# Patient Record
Sex: FEMALE | Race: WHITE | NOT HISPANIC OR LATINO | Employment: FULL TIME | URBAN - METROPOLITAN AREA
[De-identification: names, ages, dates, MRNs, and addresses within clinical notes are randomized per-mention and may not be internally consistent; named-entity substitution may affect disease eponyms.]

---

## 2017-02-17 ENCOUNTER — APPOINTMENT (OUTPATIENT)
Dept: LAB | Facility: MEDICAL CENTER | Age: 27
End: 2017-02-17

## 2017-02-17 ENCOUNTER — APPOINTMENT (OUTPATIENT)
Dept: URGENT CARE | Facility: MEDICAL CENTER | Age: 27
End: 2017-02-17

## 2017-02-17 ENCOUNTER — TRANSCRIBE ORDERS (OUTPATIENT)
Dept: URGENT CARE | Facility: MEDICAL CENTER | Age: 27
End: 2017-02-17

## 2017-02-17 DIAGNOSIS — Z00.8 SPECIAL EXAM: Primary | ICD-10-CM

## 2017-02-17 DIAGNOSIS — Z00.8 SPECIAL EXAM: ICD-10-CM

## 2017-02-17 PROCEDURE — 86480 TB TEST CELL IMMUN MEASURE: CPT

## 2017-02-17 PROCEDURE — 36415 COLL VENOUS BLD VENIPUNCTURE: CPT

## 2017-02-19 LAB
ANNOTATION COMMENT IMP: NORMAL
GAMMA INTERFERON BACKGROUND BLD IA-ACNC: 0.02 IU/ML
M TB IFN-G BLD-IMP: NEGATIVE
M TB IFN-G CD4+ BCKGRND COR BLD-ACNC: 0.02 IU/ML
M TB IFN-G CD4+ T-CELLS BLD-ACNC: 0.04 IU/ML
MITOGEN IGNF BLD-ACNC: >10 IU/ML
QUANTIFERON-TB GOLD IN TUBE: NORMAL
SERVICE CMNT-IMP: NORMAL

## 2019-04-25 ENCOUNTER — OFFICE VISIT (OUTPATIENT)
Dept: OBGYN CLINIC | Facility: MEDICAL CENTER | Age: 29
End: 2019-04-25
Payer: COMMERCIAL

## 2019-04-25 VITALS — WEIGHT: 227 LBS | SYSTOLIC BLOOD PRESSURE: 128 MMHG | DIASTOLIC BLOOD PRESSURE: 70 MMHG

## 2019-04-25 DIAGNOSIS — N92.6 IRREGULAR MENSES: Primary | ICD-10-CM

## 2019-04-25 DIAGNOSIS — E28.2 PCOS (POLYCYSTIC OVARIAN SYNDROME): ICD-10-CM

## 2019-04-25 PROCEDURE — 99203 OFFICE O/P NEW LOW 30 MIN: CPT | Performed by: NURSE PRACTITIONER

## 2019-04-25 RX ORDER — MEDROXYPROGESTERONE ACETATE 10 MG/1
TABLET ORAL
Qty: 20 TABLET | Refills: 2 | Status: SHIPPED | OUTPATIENT
Start: 2019-04-25 | End: 2021-07-19

## 2019-07-31 ENCOUNTER — APPOINTMENT (OUTPATIENT)
Dept: LAB | Facility: HOSPITAL | Age: 29
End: 2019-07-31
Payer: COMMERCIAL

## 2019-07-31 ENCOUNTER — TELEPHONE (OUTPATIENT)
Dept: OBGYN CLINIC | Facility: MEDICAL CENTER | Age: 29
End: 2019-07-31

## 2019-07-31 DIAGNOSIS — R79.89 LOW SERUM PROGESTERONE: Primary | ICD-10-CM

## 2019-07-31 DIAGNOSIS — R79.89 LOW SERUM PROGESTERONE: ICD-10-CM

## 2019-07-31 PROCEDURE — 84144 ASSAY OF PROGESTERONE: CPT

## 2019-07-31 PROCEDURE — 36415 COLL VENOUS BLD VENIPUNCTURE: CPT

## 2019-07-31 NOTE — TELEPHONE ENCOUNTER
----- Message from Osei Chan, [address] sent at 7/31/2019 12:43 PM EDT -----  Looks like this progesterone level was ordered in April to confirm ovulation  This was to have been done on day 21 of cycle - please confirm with patient that this was done with correct timing   If it was done on day 21, please let her know I would recommend that she repeats this today or tomorrow, as it is low and indicates that she has not yet ovulated   If it was not done on day 21 she can repeat this with next cycle on day 21  Please also confirm whether or not we ever received her records from previous provider - see my visit note

## 2019-07-31 NOTE — TELEPHONE ENCOUNTER
Patient had her period for 5 days  No bleeding since  She did her Progesterone on day 21 and was told it was low so she needed to repeat it - she went today to have it done  No bleeding

## 2019-07-31 NOTE — TELEPHONE ENCOUNTER
Pt returned my call and pt stated did it on day 21 when she was actively bleeding on day one she was spotting and did on the next day when she was actively bleeding which was say 21  Ordered repeat for progesterone level pt will get done tomorrow due to works if can today find time between work will do today  I reassured pt its okay if cant do till tomorrow  confirmed with pt records from previous provider received they are under epic media dated 06/20/19

## 2019-08-01 LAB — PROGEST SERPL-MCNC: 0.9 NG/ML

## 2019-08-05 ENCOUNTER — TELEPHONE (OUTPATIENT)
Dept: OBGYN CLINIC | Facility: CLINIC | Age: 29
End: 2019-08-05

## 2019-08-05 NOTE — TELEPHONE ENCOUNTER
----- Message from Becka Jett, [address]  sent at 8/2/2019  5:53 PM EDT -----  Progesterone level remains low, indicating that she has not ovulated yet this cycle  If cycles have been on the longer side recently she may check another early next week to see if there has been a rise  Or she may schedule an office visit to discuss her recent cycles since she was last seen in April

## 2019-08-05 NOTE — TELEPHONE ENCOUNTER
Patient is aware of her results  She stated her cycles have been longer  She wanted to see Mary Montez to discuss  Appt made

## 2019-08-22 ENCOUNTER — OFFICE VISIT (OUTPATIENT)
Dept: OBGYN CLINIC | Facility: MEDICAL CENTER | Age: 29
End: 2019-08-22
Payer: COMMERCIAL

## 2019-08-22 VITALS — DIASTOLIC BLOOD PRESSURE: 64 MMHG | SYSTOLIC BLOOD PRESSURE: 128 MMHG | WEIGHT: 218 LBS

## 2019-08-22 DIAGNOSIS — E28.2 PCOS (POLYCYSTIC OVARIAN SYNDROME): Primary | ICD-10-CM

## 2019-08-22 PROCEDURE — 99213 OFFICE O/P EST LOW 20 MIN: CPT | Performed by: NURSE PRACTITIONER

## 2019-08-22 NOTE — PROGRESS NOTES
Assessment/Plan:    PCOS (polycystic ovarian syndrome)  Reviewed results of forwarded records and advised results are suggestive of PCOS  Recommended consultation with RE&I to discuss induction of ovulation with Clomid and further work up if indicated  She agrees with plan and was given contact info for MARTHA Singh and [facility]  Reproductive Medicine  Recommended continuing Metformin at this time  She will trial TID dosing but if not well tolerated will resume BID dosing schedule  Advised she may continue Provera and d21 progesterone if desired  Refill sent for provera and she will call if interested in d21 progesterone levels  F/u with pos UPT or sooner PRN        Diagnoses and all orders for this visit:    PCOS (polycystic ovarian syndrome)  -     Ambulatory referral to Infertility; Future          Subjective:      Patient ID: Page Dugan is a 26 y o  female  This patient presents for PCOS, TTC follow up   Labs and imaging from prev provider are suggestive of PCOS - see under Media   Took Provera 10x10 in late May and had withdrawal bleed   d21 OPK was vague   Repeated Provera in late June then had withdrawal in July and low progesterone on d21  No period since   Tolerating metformin 500 BID  Lost about 10 lbs  They have been having intercourse q other day for the most part       The following portions of the patient's history were reviewed and updated as appropriate: allergies, current medications, past family history, past medical history, past social history, past surgical history and problem list     Review of Systems   Constitutional: Negative  Respiratory: Negative  Cardiovascular: Negative  Gastrointestinal: Negative  Genitourinary: Positive for menstrual problem  Negative for dysuria, vaginal bleeding and vaginal discharge  Musculoskeletal: Negative  Skin: Negative  Neurological: Negative  Psychiatric/Behavioral: Negative            Objective:      /64   Wt 98 9 kg (218 lb)   LMP 07/07/2019          Physical Exam   Constitutional: She is oriented to person, place, and time  She appears well-developed and well-nourished  HENT:   Head: Normocephalic  Eyes: Pupils are equal, round, and reactive to light  Neck: Normal range of motion  Pulmonary/Chest: Effort normal    Neurological: She is alert and oriented to person, place, and time  Psychiatric: She has a normal mood and affect   Her behavior is normal  Judgment and thought content normal

## 2019-08-22 NOTE — ASSESSMENT & PLAN NOTE
Reviewed results of forwarded records and advised results are suggestive of PCOS  Recommended consultation with RE&I to discuss induction of ovulation with Clomid and further work up if indicated  She agrees with plan and was given contact info for MARTHA Dorantes and [facility]  Reproductive Medicine  Recommended continuing Metformin at this time  She will trial TID dosing but if not well tolerated will resume BID dosing schedule  Advised she may continue Provera and d21 progesterone if desired  Refill sent for provera and she will call if interested in d21 progesterone levels     F/u with pos UPT or sooner PRN

## 2019-08-31 DIAGNOSIS — E28.2 PCOS (POLYCYSTIC OVARIAN SYNDROME): ICD-10-CM

## 2019-11-08 DIAGNOSIS — E28.2 PCOS (POLYCYSTIC OVARIAN SYNDROME): ICD-10-CM

## 2019-12-05 DIAGNOSIS — E28.2 PCOS (POLYCYSTIC OVARIAN SYNDROME): ICD-10-CM

## 2020-03-09 DIAGNOSIS — E28.2 PCOS (POLYCYSTIC OVARIAN SYNDROME): ICD-10-CM

## 2020-06-02 DIAGNOSIS — E28.2 PCOS (POLYCYSTIC OVARIAN SYNDROME): ICD-10-CM

## 2020-08-30 DIAGNOSIS — E28.2 PCOS (POLYCYSTIC OVARIAN SYNDROME): ICD-10-CM

## 2020-09-01 NOTE — TELEPHONE ENCOUNTER
Please contact the patient to encourage scheduling routine annual and additional refills will be provided at that time

## 2020-09-15 DIAGNOSIS — E28.2 PCOS (POLYCYSTIC OVARIAN SYNDROME): ICD-10-CM

## 2020-09-17 NOTE — TELEPHONE ENCOUNTER
Please contact the patient to encourage scheduling a routine annual gyn exam at her earliest convenience   Refills were provided 2 weeks ago and additional refills will be provided at that time

## 2020-11-11 ENCOUNTER — TELEPHONE (OUTPATIENT)
Dept: OTHER | Facility: OTHER | Age: 30
End: 2020-11-11

## 2021-06-12 ENCOUNTER — TELEPHONE (OUTPATIENT)
Dept: OTHER | Facility: OTHER | Age: 31
End: 2021-06-12

## 2021-06-12 NOTE — TELEPHONE ENCOUNTER
Pt is in early pregnancy and would like to schedule a new patient appt with Dr Duran Single  Please call patient during office hours to schedule

## 2021-06-30 ENCOUNTER — ULTRASOUND (OUTPATIENT)
Dept: OBGYN CLINIC | Facility: CLINIC | Age: 31
End: 2021-06-30
Payer: COMMERCIAL

## 2021-06-30 VITALS
SYSTOLIC BLOOD PRESSURE: 110 MMHG | DIASTOLIC BLOOD PRESSURE: 80 MMHG | BODY MASS INDEX: 32.37 KG/M2 | WEIGHT: 201.4 LBS | HEIGHT: 66 IN

## 2021-06-30 DIAGNOSIS — N91.2 AMENORRHEA: Primary | ICD-10-CM

## 2021-06-30 DIAGNOSIS — Z36.9 ANTENATAL SCREENING ENCOUNTER: ICD-10-CM

## 2021-06-30 PROCEDURE — 76817 TRANSVAGINAL US OBSTETRIC: CPT | Performed by: NURSE PRACTITIONER

## 2021-06-30 RX ORDER — METFORMIN HYDROCHLORIDE 500 MG/1
500 TABLET, EXTENDED RELEASE ORAL 3 TIMES DAILY
COMMUNITY
Start: 2021-05-28 | End: 2021-08-22

## 2021-06-30 RX ORDER — LEVOTHYROXINE SODIUM 0.05 MG/1
50 TABLET ORAL DAILY
COMMUNITY
Start: 2021-05-30 | End: 2021-09-02 | Stop reason: SDUPTHER

## 2021-06-30 NOTE — PATIENT INSTRUCTIONS
First Trimester Pregnancy   WHAT YOU NEED TO KNOW:   The first trimester of pregnancy lasts from your last period through the 12th week of pregnancy  Follow up with your healthcare providers for prenatal care  Regular prenatal care can help to keep you and your baby healthy  DISCHARGE INSTRUCTIONS:   Return to the emergency department if:   · You have pain or cramping in your abdomen or low back  · You have severe vaginal bleeding or clotting  Call your doctor or obstetrician if:   · You have light bleeding  · You have chills or a fever  · You have vaginal itching, burning, or pain  · You have yellow, green, white, or foul-smelling vaginal discharge  · You have pain or burning when you urinate, less urine than usual, or pink or bloody urine  · You have questions or concerns about your condition or care  Follow up with your doctor or obstetrician as directed:  Go to all of your prenatal visits during your pregnancy  Write down your questions so you remember to ask them during your visits  Prenatal care:  Prenatal care is a series of visits with your healthcare provider throughout your pregnancy  Prenatal care can help prevent problems during pregnancy and childbirth  At each prenatal visit, your healthcare provider will weigh you and check your blood pressure  Your healthcare provider will also check your baby's heartbeat and growth  You may also need the following at some visits:  · A pelvic exam  allows your healthcare provider to see your cervix (the bottom part of your uterus)  Your healthcare provider will use a speculum to open your vagina  He or she will check the size and shape of your uterus  · Blood tests  may be done to check for any of the following:     ? Gestational diabetes or anemia (low iron level)    ? Blood type or Rh factor, or certain birth defects    ? Immunity to certain diseases, such as chickenpox or rubella    ?  An infection, such as a sexually transmitted infection, HIV, or hepatitis B    · Hepatitis B  may need to be prevented or treated  Hepatitis B is inflammation of the liver caused by the hepatitis B virus (HBV)  HBV can spread from a mother to her baby during delivery  You will be checked for HBV as early as possible in the first trimester of each pregnancy  You need the test even if you received the hepatitis B vaccine or were tested before  You may need to have an HBV infection treated before you give birth  · Urine tests  may also be done to check for sugar and protein  These can be signs of gestational diabetes or preeclampsia  Urine tests may also be done to check for signs of infection  · A fetal ultrasound  shows pictures of your baby inside your uterus  The pictures are used to check your baby's development, movement, and position  Your healthcare provider may be able to tell you if you are having a boy or a girl during the ultrasound  This is usually possible at around 18 to 22 weeks  · Genetic disorder screening tests  may be offered to you  These screening tests check your baby's risk for genetic disorders such as Down syndrome  A screening test includes a blood test and ultrasound  Stay healthy during pregnancy:   · Take prenatal vitamins as directed  Prenatal vitamins can help you get the amount of vitamins and minerals you need during pregnancy  Prenatal vitamins may also decrease the risk of certain birth defects  · Eat a variety of healthy foods  Healthy foods include fruits, vegetables, whole-grain breads, low-fat dairy products, beans, turkey and chicken, and lean red meat  Ask your healthcare provider for more information about foods that are healthy and safe to eat during pregnancy  · Drink liquids as directed  Ask how much liquid to drink each day and which liquids are best for you  Some healthy liquids include milk, water, and juice  It is not clear how caffeine affects pregnancy   Limit your intake of caffeine to less than 200 mg each day to avoid possible health problems  Caffeine may be found in coffee, tea, cola, sports drinks, and chocolate  Do not drink alcohol  · Talk to your healthcare provider before you take medicines  Many medicines can harm your baby, especially during early pregnancy  Ask your healthcare provider before you take any medicines, including over-the-counter medicines, vitamins, herbs, or food supplements  Never use illegal or street drugs while you are pregnant  Talk to your healthcare provider if you are having trouble quitting street drugs  · Exercise as directed  Ask your healthcare provider about the best exercise plan for you  Exercise may help you feel better and make your labor and delivery easier  · Do not smoke  Smoking increases your risk of a miscarriage and other health problems during your pregnancy  Smoking can cause your baby to be born too early or weigh less at birth  Quit smoking as soon as you think you might be pregnant  Ask your healthcare provider for information if you need help quitting  Safety tips:   · Do not use a hot tub or sauna  while you are pregnant, especially during your first trimester  Hot tubs and saunas may raise your baby's temperature and increase the risk of birth defects  It also increases your risk of miscarriage  · Protect yourself from illness  Toxoplasmosis is a disease that can cause birth defects  To protect yourself from this disease, do not clean your cat's litter box yourself  Ask someone else to clean your cat's litter box while you are pregnant  Also, do not  eat raw meat or unwashed fruits and vegetables  Wash your hands after you touch raw meat, and eat only well-cooked meat  Wash fruits and vegetables well before you eat them  © Copyright [address] Information is for End User's use only and may not be sold, redistributed or otherwise used for commercial purposes   All illustrations and images included in CareNotes® are the copyrighted property of [employer]  or Reza Caz[employer]   The above information is an  only  It is not intended as medical advice for individual conditions or treatments  Talk to your doctor, nurse or pharmacist before following any medical regimen to see if it is safe and effective for you

## 2021-06-30 NOTE — PROGRESS NOTES
Technician: Study performed by the interpreting Certified Nurse Practitioner    Indications:  amenorrhea       Prior to use, disinfection was performed with Trophon Disinfection Process  Probe Serial Number#                                          [id number] [facility])      LMP 21   (10 weeks 1 days gestational age based on dates), IUI assisted pregnancy, [place] pt  Hx of PCOS  , chemical pregnancy       Procedure Details  A gestational sac is seen containing a yolk sac and a early embryo  The embryonic crown-rump length measures 3 21cm  and calculates to an estimated gestational age of 9 weeks and 1 Day  Embryonic cardiac activity is present @ 130's     Findings:   Viable, early intrauterine pregnancy at  10 weeks,  1 days, (no change to MARIANN)  with a calculated MARIANN of 22    Plan to RTO for OB Intake  First Trimester Screening reviewed- referral given

## 2021-07-12 ENCOUNTER — TELEMEDICINE (OUTPATIENT)
Dept: OBGYN CLINIC | Facility: CLINIC | Age: 31
End: 2021-07-12

## 2021-07-12 VITALS — HEIGHT: 66 IN | BODY MASS INDEX: 32.3 KG/M2 | WEIGHT: 201 LBS

## 2021-07-12 DIAGNOSIS — Z34.81 PRENATAL CARE, SUBSEQUENT PREGNANCY, FIRST TRIMESTER: ICD-10-CM

## 2021-07-12 PROCEDURE — OBC: Performed by: NURSE PRACTITIONER

## 2021-07-12 RX ORDER — CHOLECALCIFEROL (VITAMIN D3) 125 MCG
2000 CAPSULE ORAL DAILY
COMMUNITY

## 2021-07-12 NOTE — PATIENT INSTRUCTIONS
Gestational Diabetes   AMBULATORY CARE:   Gestational diabetes (GDM)  is a type of diabetes that develops during pregnancy, usually in the second or third trimester  GDM causes your blood sugar level to rise too high  This can harm you and your unborn baby  Blood sugar levels usually go back to normal after the baby is born  The cause of GDM is not known  Hormones made by the placenta may cause insulin resistance  Insulin helps move sugar out of the blood so it can be used for energy  Insulin resistance means your pancreas makes insulin, but your body cannot use it  As the placenta grows, more of these hormones are produced  The hormones block insulin and cause your blood sugar level to rise  Signs and symptoms of GDM include the following:   · More hunger or thirst than usual    · Having to urinate often    · Blurred vision    · Feeling more tired than usual    · Bladder, vagina, or skin infections that happen often    · More weight gain than your healthcare provider suggests during your pregnancy    · Nausea or vomiting    Seek care immediately if:   · Your heartbeat is fast and weak, or your breathing is fast and shallow  · You are more sleepy than usual or become confused  · You have blurred or double vision  · Your breath has a fruity, sweet smell  · You are shaking or sweating  · Your blood sugar level is below 70 mg/dL or above 250 mg/dL and does not improve with treatment  · You have a headache, or you are dizzy  Call your doctor or diabetes care team if:   · You think your baby is not moving as much as usual     · You have more hunger or thirst than usual     · You are urinating more often than usual     · You have an upset stomach and are vomiting  · You have questions or concerns about your condition or care      The following may increase your risk for GDM:   · Lack of physical activity, such as exercise    · A family history of diabetes    · A history of high blood sugar, blood pressure, or cholesterol levels    · Being overweight or obese    · Past delivery of a large baby    · Glycosuria (sugar in your urine) or polycystic ovary syndrome (PCOS)    · Being [place] American, , , [place] American, or [place] heritage    Manage GDM:  GDM may be controlled with meal planning and physical activity  The goal is to keep your blood sugar level as close to normal, as safely as possible  Your healthcare provider and dietitian will help set up a meal and activity plan for you  · Follow your meal plan as directed  Talk to a dietitian or healthcare provider about the best meal plan for you  He or she may recommend that you eat 3 small meals and 2 to 4 snacks every day  Control the amount of carbohydrates (such as bread, cereal, and fruit) you eat at each meal and snack  Too much carbohydrate in 1 meal or snack can cause your blood sugar to rise to a high level  Your dietitian or provider will tell you how much carbohydrate to eat at each meal and snack  Eat foods that are a good source of fiber, such as vegetables and legumes (beans and lentils)  · Ask your provider about the best physical activity plan for you  Physical activity helps keep your blood sugar level steady  A good goal is to be active for at least 30 minutes, 5 days a week  Low-impact activities such as walking or swimming are effective  Insulin  may be needed if your diabetes is not controlled by nutrition and exercise  Insulin is safe to use during pregnancy  Check your blood sugar level as directed:   · You will be taught how to check a small drop of blood in a glucose monitor  Ask your healthcare provider when and how often to check your blood sugar level during the day  You may need to check your blood sugar level at least 3 times each day  · Ask your provider what your blood sugar levels should be before and after you eat   He or she may suggest that your blood sugar level should be at or below 95 mg/dL before you eat  The level may need to be at or below 140 mg/dL 1 hour after you eat or at or below 120 mg/dL 2 hours after you eat  Your provider may give you higher target levels if you are at risk for hypoglycemia  Write down your results, and show them to your provider  He or she may use the results to make changes to your medicine, food, and physical activity schedules  What else you need to know about GDM:   · Your A1c level may be checked  A hemoglobin A1c is a blood test that measures your average blood sugar level for the past 2 to 3 months  It is also called an HbA1c or glycohemoglobin test  The level is given as a percentage  An A1c of 6% or lower is usually recommended during pregnancy  If you are at risk for hypoglycemia, your goal may be 7%  Changes to your nutrition, physical activity, or medicine plan may be made to help you reach your goal  Your provider may recommend that you have your A1c checked 1 time each month  · Check your blood pressure often  High blood pressure can cause problems with your health and your pregnancy  Blood pressure readings are usually written as 2 numbers  Your systolic blood pressure (the first number) should be between 110 and 129  Your diastolic blood pressure (the second number) should be between 65 and 79          · Maintain a healthy weight  A healthy weight can help you control your GDM  Ask your healthcare provider how much weight is healthy for you to gain during your pregnancy  If you were overweight before you became pregnant, he or she may recommend a safe weight loss plan during pregnancy  Your provider or a dietitian can help you create a healthy meal plan for you and your baby  Do not  try to go on a crash diet or try to lose weight without your provider's approval  You may not get enough calories or nutrients for you and your baby  · Do not smoke    Nicotine is dangerous for you and your baby and can make it harder to manage GDM  Do not use e-cigarettes or smokeless tobacco in place of cigarettes or to help you quit  They still contain nicotine  Ask your provider for information if you currently smoke and need help quitting  · You may need diabetes screening after you give birth  Screening may be done 4 to 12 weeks after your baby is born  This is to check if you have developed diabetes, problems with your fasting glucose levels, or glucose intolerance  You may need other tests or treatment if you have any of these  Testing may be repeated every 1 to 3 years if you had GDM but normal tests within 12 weeks of giving birth  Follow up with your doctor or diabetes care team as directed: You will need to have screening tests for diabetes 4 to 12 weeks after you have your baby  You may also need to have tests for diabetes every 1 to 3 years for life  Write down your questions so you remember to ask them during your visits  © [employer] 2021 Information is for End User's use only and may not be sold, redistributed or otherwise used for commercial purposes  All illustrations and images included in CareNotes® are the copyrighted property of A D A M , Inc  or [address] [place], [place] [address]pawan   The above information is an  only  It is not intended as medical advice for individual conditions or treatments  Talk to your doctor, nurse or pharmacist before following any medical regimen to see if it is safe and effective for you  Pregnancy at [address] to [address]:   You are now at the end of your first trimester and entering your second trimester  Morning sickness usually goes away by this time  You may have other symptoms such as fatigue, frequent urination, and headaches  You may have gained 2 to 4 pounds by now  DISCHARGE INSTRUCTIONS:   Return to the emergency department if:   · You have pain or cramping in your abdomen or low back  · You have heavy vaginal bleeding or clotting      · You pass material that looks like tissue or large clots  Collect the material and bring it with you  Call your doctor or obstetrician if:   · You cannot keep food or drinks down, and you are losing weight  · You have light bleeding  · You have chills or a fever  · You have vaginal itching, burning, or pain  · You have yellow, green, white, or foul-smelling vaginal discharge  · You have pain or burning when you urinate, less urine than usual, or pink or bloody urine  · You have questions or concerns about your condition or care  How to care for yourself at this stage of your pregnancy:   · Get plenty of rest   You may feel more tired than normal  You may need to take naps or go to bed earlier  · Manage nausea and vomiting  Avoid fatty and spicy foods  Eat small meals throughout the day instead of large meals  Vicky may help to decrease nausea  Ask your healthcare provider about other ways of decreasing nausea and vomiting  · Eat a variety of healthy foods  Healthy foods include fruits, vegetables, whole-grain breads, low-fat dairy foods, beans, lean meats, and fish  Drink liquids as directed  Ask how much liquid to drink each day and which liquids are best for you  Limit caffeine to less than 200 milligrams each day  Limit your intake of fish to 2 servings each week  Choose fish low in mercury such as canned light tuna, shrimp, salmon, cod, or tilapia  Do not  eat fish high in mercury such as swordfish, tilefish, roland mackerel, and shark  · Take prenatal vitamins as directed  Your need for certain vitamins and minerals, such as folic acid, increases during pregnancy  Prenatal vitamins provide some of the extra vitamins and minerals you need  Prenatal vitamins may also help to decrease the risk of certain birth defects  · Do not smoke  Smoking increases your risk of a miscarriage and other health problems during your pregnancy   Smoking can cause your baby to be born too early or weigh less at birth  Ask your healthcare provider for information if you need help quitting  · Do not drink alcohol  Alcohol passes from your body to your baby through the placenta  It can affect your baby's brain development and cause fetal alcohol syndrome (FAS)  FAS is a group of conditions that causes mental, behavior, and growth problems  · Talk to your healthcare provider before you take any medicines  Many medicines may harm your baby if you take them when you are pregnant  Do not take any medicines, vitamins, herbs, or supplements without first talking to your healthcare provider  Never use illegal or street drugs (such as marijuana or cocaine) while you are pregnant  Safety tips during pregnancy:   · Avoid hot tubs and saunas  Do not use a hot tub or sauna while you are pregnant, especially during your first trimester  Hot tubs and saunas may raise your baby's temperature and increase the risk of birth defects  · Avoid toxoplasmosis  This is an infection caused by eating raw meat or being around infected cat feces  It can cause birth defects, miscarriages, and other problems  Wash your hands after you touch raw meat  Make sure any meat is well-cooked before you eat it  Avoid raw eggs and unpasteurized milk  Use gloves or ask someone else to clean your cat's litter box while you are pregnant  Changes happening with your baby: Your baby has fully formed fingernails and toenails  Your baby's heartbeat can now be heard  Ask your healthcare provider if you can listen to your baby's heartbeat  By week 14, your baby is over 4 inches long from the top of the head to the rump (baby's bottom)  Your baby weighs over 3 ounces  Prenatal care:  Prenatal care is a series of visits with your healthcare provider throughout your pregnancy  During the first 28 weeks of your pregnancy, you will see your healthcare provider 1 time each month   Prenatal care can help prevent problems during pregnancy and childbirth  Your healthcare provider will check your blood pressure and weight  Your baby's heart rate will also be checked  You may also need the following at some visits:  · A pelvic exam  allows your healthcare provider to see your cervix (the bottom part of your uterus)  Your healthcare provider will use a speculum to open your vagina  He or she will check the size and shape of your uterus  · Blood tests  may be done to check for any of the following:     ? Gestational diabetes or anemia (low iron level)    ? Blood type or Rh factor, or certain birth defects    ? Immunity to certain diseases, such as chickenpox or rubella    ? An infection, such as a sexually transmitted infection, HIV, or hepatitis B    · Hepatitis B  may need to be prevented or treated  Hepatitis B is inflammation of the liver caused by the hepatitis B virus (HBV)  HBV can spread from a mother to her baby during delivery  You will be checked for HBV as early as possible in the first trimester of each pregnancy  You need the test even if you received the hepatitis B vaccine or were tested before  You may need to have an HBV infection treated before you give birth  · Urine tests  may also be done to check for sugar and protein  These can be signs of gestational diabetes or preeclampsia  Urine tests may also be done to check for signs of infection  · A fetal ultrasound  shows pictures of your baby inside your uterus  The pictures are used to check your baby's development, movement, and position  · Genetic disorder screening tests  may be offered to you  These tests check your baby's risk for genetic disorders such as Down syndrome  A screening test includes a blood test and ultrasound  Follow up with your doctor or obstetrician as directed:  Go to all prenatal visits  Write down your questions so you remember to ask them during your visits    © Copyright [employer] 2020 Information is for End User's use only and may not be sold, redistributed or otherwise used for commercial purposes  All illustrations and images included in CareNotes® are the copyrighted property of A D A M , Inc  or Reza Ridley  The above information is an  only  It is not intended as medical advice for individual conditions or treatments  Talk to your doctor, nurse or pharmacist before following any medical regimen to see if it is safe and effective for you

## 2021-07-12 NOTE — PROGRESS NOTES
OB INTAKE INTERVIEW  Patient is 27 y o y o  who presents for OB intake at 11wks  She is accompanied by: herself  The father of her baby Carlos Sinclair) is involved in the pregnancy and is 35years old    Last Menstrual Period: 2021  Ultrasound: Measured 10 weeks 1 days on 2021 by LD  Estimated Date of Delivery: 2022 confirmed 10 week US    Signs/Symptoms of Pregnancy  Current pregnancy symptoms: fatigue, breast tenderness  Constipation no  Headaches no  Cramping/spotting no  PICA cravings no    Diabetes-  Body mass index is 32 44 kg/m²  If patient has 1 or more, please order early 1 hour GTT  History of GDM no  BMI >35 no  History of PCOS or current metformin use YES hx PCOS and 400mg TID metformin  History of LGA/macrosomic infant (4000g/9lbs) no    If patient has 2 or more, please order early 1 hour GTT  BMI>30 YES  AMA no  First degree relative with type 2 diabetes no  History of chronic HTN, hyperlipidemia, elevated A1C no  High risk race (, , ,  or ) no    Hypertension- if you answer yes, please order preeclampsia labs (cbc, comprehensive metabolic panel, urine protein creatinine ratio, uric acid)  History of of chronic HTN no  History of gestational HTN no  History of preeclampsia, eclampsia, or HELLP syndrome no  History of diabetes no  History of lupus, autoimmune disease, kidney disease no    Thyroid- if yes order TSH with reflex T4  History of thyroid disease YEShypo    Bleeding Disorder or Hx of DVT-patient or first degree relative with history of  Order the following if not done previously     (Factor V, antithrombin III, prothrombin gene mutation, protein C and S Ag, lupus anticoagulant, anticardiolipin, beta-2 glycoprotein)   no    OB/GYN-  History of abnormal pap smear no  History of HPV no  History of Herpes/HSV no  History of other STI (gonorrhea, chlamydia, trich) no  History of prior  no  History of prior  no  History of  delivery prior to 36 weeks 6 days no  History of blood transfusion no  Ok for blood transfusion YES    Substance screening- if yes outside of tobacco for her or anyone in her home-order urine drug screen  History of tobacco use YES quit few yrs ago  Currently using tobacco no  Currently using alcohol no  Presently using drugs no  Past drug use  no  IV drug use-If yes add Hep C antibody to labs no  Partner drug use no  Parent/Family drug use no    MRSA Screening-   Does the pt have a hx of MRSA? no  If yes- please follow MRSA protocol and obtain a nasal swab for MRSA culture    Immunizations:  Influenza vaccine given this season YES   Discussed Tdap vaccine YES  Discussed COVID Vaccine YES    Genetic/MFM-  Do you or your partner have a history of any of the following in yourselves or first degree relatives? Cystic fibrosis no  Spinal muscular atrophy no  Hemoglobinopathy/Sickle Cell/Thalassemia no  Fragile X Intellectual Disability no    If yes, discuss carrier screening and recommend consultation with MFM/genetic counseling  If no, discuss option for carrier screening and/or genetic testing with Nuchal Ultrasound  Patient interested YES  Appointment at [place] [id number] made YES  NT U/S setup already  Pt and  had genetic screening already    Interview education  [facility] Pregnancy Essentials Book reviewed and discussed YES    Nurse/Family Partnership- patient may qualify YES; referral placed NO    Prenatal lab work scripts YES  Extra labs ordered:  1hr glucose    The patient has a history now or in prior pregnancy notable for:   PCOS      Details that I feel the provider should be aware of: Deborah Puckett and her  Particia Niece went to [facility] and doing fertility tx  First was an early chemical pregnancy approx 5wk  She has hx PCOS and on Metformin, also hypothyroid found out during treatment process  She is currently on progesterone and was having her HCG and levels checked wkly   She is nervous because her last labs were on the low end of normal   Last pap was 2017 might need at PN1  PN1 visit scheduled  The patient was oriented to our practice, reviewed delivering physicians and Pager for Delivery  All questions were answered  YES    Interviewed by: China Toro MA

## 2021-07-19 ENCOUNTER — ROUTINE PRENATAL (OUTPATIENT)
Dept: PERINATAL CARE | Facility: CLINIC | Age: 31
End: 2021-07-19
Payer: COMMERCIAL

## 2021-07-19 VITALS
DIASTOLIC BLOOD PRESSURE: 70 MMHG | HEART RATE: 94 BPM | BODY MASS INDEX: 32.33 KG/M2 | HEIGHT: 66 IN | WEIGHT: 201.2 LBS | SYSTOLIC BLOOD PRESSURE: 132 MMHG

## 2021-07-19 DIAGNOSIS — Z36.82 NUCHAL TRANSLUCENCY OF FETUS ON PRENATAL ULTRASOUND: ICD-10-CM

## 2021-07-19 DIAGNOSIS — E28.2 PCOS (POLYCYSTIC OVARIAN SYNDROME): ICD-10-CM

## 2021-07-19 DIAGNOSIS — Z36.9 ANTENATAL SCREENING ENCOUNTER: ICD-10-CM

## 2021-07-19 DIAGNOSIS — O09.01 PREGNANCY WITH HISTORY OF INFERTILITY IN FIRST TRIMESTER: ICD-10-CM

## 2021-07-19 DIAGNOSIS — O99.211 OBESITY COMPLICATING PREGNANCY, FIRST TRIMESTER: Primary | ICD-10-CM

## 2021-07-19 DIAGNOSIS — Z3A.12 12 WEEKS GESTATION OF PREGNANCY: ICD-10-CM

## 2021-07-19 PROBLEM — O99.281 HYPOTHYROIDISM DURING PREGNANCY IN FIRST TRIMESTER: Status: ACTIVE | Noted: 2021-07-19

## 2021-07-19 PROBLEM — E03.9 HYPOTHYROIDISM DURING PREGNANCY IN FIRST TRIMESTER: Status: ACTIVE | Noted: 2021-07-19

## 2021-07-19 PROCEDURE — 76813 OB US NUCHAL MEAS 1 GEST: CPT | Performed by: OBSTETRICS & GYNECOLOGY

## 2021-07-19 PROCEDURE — 99204 OFFICE O/P NEW MOD 45 MIN: CPT | Performed by: OBSTETRICS & GYNECOLOGY

## 2021-07-19 NOTE — PROGRESS NOTES
Patient has 2 insurances, states primary is [employer] plan and she chose to use [facility]  Gave lab slip for Noninvasive Prenatal Screen [facility]  Patient is aware that if she changes her mind she may contact [facility] and return to [facility] to  new [facility]  lab slip and test kit  Patient aware Qnatal blood sample is drawn at [facility] and online appointment scheduling and lab locations can be found @ www  [employer]  Patient states she has used [facility] before and did online appt scheduling  Qnatal  card given, patient instructed how to check her out- of -pocket responsibility @ [employer]  Patient made aware if Qnatal  unable to give an estimate she will need to contact [facility] office prior to blood draw  Insurance may require prior authorization, if test drawn without prior authorization pateint will be responsible for full cost of test   All [employer] products require prior authorization @ this time, [facility] office will initiate the authorization, this may take 7-14 days  Patient aware that  is provided by third party and is only an estimate of cost not a guarantee  For definitive cost, patient encouraged to call her insurance provider- insurance phone # located on the back of her ID card  Patient aware that viewing results on line will reveal gender  Patient verbalized understanding of all instructions and no questions at this time  Request sent to [address] to prior [address] [facility] [address]

## 2021-07-19 NOTE — LETTER
2021     Osei Delatorre, [address] [place] [address] [address] [place], [place] [id number]    Patient: Rolf Echols   YOB: 1990   Date of Visit: 2021       Dear Dr Cheryle Boyers: Thank you for referring Rolf Echols to me for evaluation  Below are my notes for this consultation  If you have questions, please do not hesitate to call me  I look forward to following your patient along with you  Sincerely,        Ryan Nieves MD        CC: No Recipients  Ryan Nieves MD  2021  6:36 PM  Sign when Signing Visit  OFFICE CONSULT  Referring physician:   Floridalma Gil  [address]      Dear Duke Anderson      Thank you for requesting a  consultation on your patient Ms Rolf Echols for the following indications:  Genetic screening    History  Medications:  Metformin  milligrams 3 times a day, levothyroxine  50 micro grams daily,  Prenatal vitamins daily, vitamin D3 2000 international units daily, marcos-inositol and D chiro 40:1 ratio and vaginal progesterone 400 milligrams q h s  Allergies to medications:  Co Q10 causes moderate hives  Past medical history:  PCOS,  Subclinical hypothyroidism or clinical hypothyroidism  Patient is not sure and records in chart do not show either  Her last TSH was 0 8 on 21  She has irregular periods that required achieving pregnancy through IUI  Past surgical history:  none  Past obstetrical history:   2 para 0010 with a history of a 4-5 week chemical pregnancy diagnosed 2021 and spontaneously miscarried  Social history:  She  Is a former smoker and quit in 2019 and denies any substance use in pregnancy  First generation family history:   She reports thyroid issues in both parents    Ultrasound findings: The ultrasound shows a fetus concordant with dates   The nasal bone and nuchal translucency appears normal  No malformations are seen on today's early ultrasound  The patient was informed of the findings and counseled about the limitations of the exam in detecting all forms of fetal congenital abnormalities  She does not report any vaginal bleeding or uterine cramping or contractions  Specific counseling was provided on the following problems:  1  We discussed the options for genetic screening which include invasive testing on the fetal placenta or on fetal skin cells within the amniotic fluid and compared this to noninvasive testing which includes cell free DNA screening and the sequential screen  We reviewed the risks, the benefits and the limitations of each  In the end patient chose to complete the cell free DNA screen  Future tests recommended:  1  The results of her NIPT will return in 7-10 days and her OB office will order an MSAFP screen at 16-18 weeks to screen for spina bifida  2  Recommend she complete an early Glucola screen and a baseline TSH with her prenatal labs  Recommend she stop her metformin 1 week prior to completing her glucola screen  3   In the first trimester, the normal range for TSH level is 0 1 to 2 5 mIU/L; this level increases to 0 2 to 3 0 mIU/L in the second trimester and 0 3 to 3 0 mIU/L in the third trimester  Her TSH levels should be rechecked with each trimester and then her meds titrated to keep her TSH in the normal range for pregnancy  Concurrent use of iron or calcium and her thyroid meds may result in decreased effectiveness of her meds  4  With her elevated BMI and prior poor pregnancy outcome recommend she start baby aspirin 162 milligrams daily  5  She can stop her progesterone now but due to her anxietyfrom her prior loss she would like to continue it till the end of her first-trimester  Future ultrasounds ordered today:   1  Fetal Level II ultrasound imaging is recommended at 19-20 weeks' gestation    2  Will have my staff call Irina Michael to obtain a formal diagnosis of whether she has clinical or subclinical hypothyroidism         The face to face time, in addition to time spent discussing ultrasound results, was approximately 15 minutes, greater than 50% of which was spent during counseling and coordination of care    Melvina Snider MD

## 2021-07-19 NOTE — Clinical Note
Please ask Harrison Calles where Patient had IVF to let us know if the patient has a diagnosis of subclinical hypothyroidism or true hypothyroidism so we know whether she needs to be on medication for the rest of her life or just through pregnancy      Lupe Kenyon

## 2021-07-20 NOTE — PROGRESS NOTES
OFFICE CONSULT  Referring physician:   Floridalma Erickson  [address] [address]      Dear Herminia Pereira      Thank you for requesting a  consultation on your patient Ms Fercho Conklin for the following indications:  Genetic screening    History  Medications:  Metformin  milligrams 3 times a day, levothyroxine  50 micro grams daily,  Prenatal vitamins daily, vitamin D3 2000 international units daily, marcos-inositol and D chiro 40:1 ratio and vaginal progesterone 400 milligrams q h s  Allergies to medications:  Co Q10 causes moderate hives  Past medical history:  PCOS,  Subclinical hypothyroidism or clinical hypothyroidism  Patient is not sure and records in chart do not show either  Her last TSH was 0 8 on 21  She has irregular periods that required achieving pregnancy through IUI  Past surgical history:  none  Past obstetrical history:   2 para 0010 with a history of a 4-5 week chemical pregnancy diagnosed 2021 and spontaneously miscarried  Social history:  She  Is a former smoker and quit in 2019 and denies any substance use in pregnancy  First generation family history:   She reports thyroid issues in both parents    Ultrasound findings: The ultrasound shows a fetus concordant with dates  The nasal bone and nuchal translucency appears normal  No malformations are seen on today's early ultrasound  The patient was informed of the findings and counseled about the limitations of the exam in detecting all forms of fetal congenital abnormalities  She does not report any vaginal bleeding or uterine cramping or contractions  Specific counseling was provided on the following problems:  1   We discussed the options for genetic screening which include invasive testing on the fetal placenta or on fetal skin cells within the amniotic fluid and compared this to noninvasive testing which includes cell free DNA screening and the sequential screen  We reviewed the risks, the benefits and the limitations of each  In the end patient chose to complete the cell free DNA screen  Future tests recommended:  1  The results of her NIPT will return in 7-10 days and her OB office will order an MSAFP screen at 16-18 weeks to screen for spina bifida  2  Recommend she complete an early Glucola screen and a baseline TSH with her prenatal labs  Recommend she stop her metformin 1 week prior to completing her glucola screen  3   In the first trimester, the normal range for TSH level is 0 1 to 2 5 mIU/L; this level increases to 0 2 to 3 0 mIU/L in the second trimester and 0 3 to 3 0 mIU/L in the third trimester  Her TSH levels should be rechecked with each trimester and then her meds titrated to keep her TSH in the normal range for pregnancy  Concurrent use of iron or calcium and her thyroid meds may result in decreased effectiveness of her meds  4  With her elevated BMI and prior poor pregnancy outcome recommend she start baby aspirin 162 milligrams daily  5  She can stop her progesterone now but due to her anxietyfrom her prior loss she would like to continue it till the end of her first-trimester  Future ultrasounds ordered today:   1  Fetal Level II ultrasound imaging is recommended at 19-20 weeks' gestation  2  Will have my staff call Lidia Zapien to obtain a formal diagnosis of whether she has clinical or subclinical hypothyroidism         The face to face time, in addition to time spent discussing ultrasound results, was approximately 15 minutes, greater than 50% of which was spent during counseling and coordination of care    Ambreen Wayne MD

## 2021-07-24 ENCOUNTER — APPOINTMENT (OUTPATIENT)
Dept: LAB | Facility: HOSPITAL | Age: 31
End: 2021-07-24
Payer: COMMERCIAL

## 2021-07-24 DIAGNOSIS — Z34.81 PRENATAL CARE, SUBSEQUENT PREGNANCY, FIRST TRIMESTER: ICD-10-CM

## 2021-07-24 LAB
ABO GROUP BLD: NORMAL
BACTERIA UR QL AUTO: ABNORMAL /HPF
BASOPHILS # BLD AUTO: 0.04 THOUSANDS/ΜL (ref 0–0.1)
BASOPHILS NFR BLD AUTO: 0 % (ref 0–1)
BILIRUB UR QL STRIP: NEGATIVE
BLD GP AB SCN SERPL QL: NEGATIVE
CLARITY UR: CLEAR
COLOR UR: YELLOW
EOSINOPHIL # BLD AUTO: 0.23 THOUSAND/ΜL (ref 0–0.61)
EOSINOPHIL NFR BLD AUTO: 2 % (ref 0–6)
ERYTHROCYTE [DISTWIDTH] IN BLOOD BY AUTOMATED COUNT: 13 % (ref 11.6–15.1)
GLUCOSE UR STRIP-MCNC: NEGATIVE MG/DL
HCT VFR BLD AUTO: 39.3 % (ref 34.8–46.1)
HGB BLD-MCNC: 13.3 G/DL (ref 11.5–15.4)
HGB UR QL STRIP.AUTO: NEGATIVE
IMM GRANULOCYTES # BLD AUTO: 0.07 THOUSAND/UL (ref 0–0.2)
IMM GRANULOCYTES NFR BLD AUTO: 1 % (ref 0–2)
KETONES UR STRIP-MCNC: NEGATIVE MG/DL
LEUKOCYTE ESTERASE UR QL STRIP: NEGATIVE
LYMPHOCYTES # BLD AUTO: 1.65 THOUSANDS/ΜL (ref 0.6–4.47)
LYMPHOCYTES NFR BLD AUTO: 16 % (ref 14–44)
MCH RBC QN AUTO: 30 PG (ref 26.8–34.3)
MCHC RBC AUTO-ENTMCNC: 33.8 G/DL (ref 31.4–37.4)
MCV RBC AUTO: 89 FL (ref 82–98)
MONOCYTES # BLD AUTO: 0.57 THOUSAND/ΜL (ref 0.17–1.22)
MONOCYTES NFR BLD AUTO: 5 % (ref 4–12)
NEUTROPHILS # BLD AUTO: 7.98 THOUSANDS/ΜL (ref 1.85–7.62)
NEUTS SEG NFR BLD AUTO: 76 % (ref 43–75)
NITRITE UR QL STRIP: NEGATIVE
NON-SQ EPI CELLS URNS QL MICRO: ABNORMAL /HPF
NRBC BLD AUTO-RTO: 0 /100 WBCS
PH UR STRIP.AUTO: 6 [PH]
PLATELET # BLD AUTO: 277 THOUSANDS/UL (ref 149–390)
PMV BLD AUTO: 10 FL (ref 8.9–12.7)
PROT UR STRIP-MCNC: NEGATIVE MG/DL
RBC # BLD AUTO: 4.44 MILLION/UL (ref 3.81–5.12)
RBC #/AREA URNS AUTO: ABNORMAL /HPF
RH BLD: POSITIVE
SP GR UR STRIP.AUTO: 1.01 (ref 1–1.03)
SPECIMEN EXPIRATION DATE: NORMAL
UROBILINOGEN UR QL STRIP.AUTO: 0.2 E.U./DL
WBC # BLD AUTO: 10.54 THOUSAND/UL (ref 4.31–10.16)
WBC #/AREA URNS AUTO: ABNORMAL /HPF

## 2021-07-24 PROCEDURE — 36415 COLL VENOUS BLD VENIPUNCTURE: CPT

## 2021-07-24 PROCEDURE — 81001 URINALYSIS AUTO W/SCOPE: CPT

## 2021-07-24 PROCEDURE — 80081 OBSTETRIC PANEL INC HIV TSTG: CPT

## 2021-07-24 PROCEDURE — 87086 URINE CULTURE/COLONY COUNT: CPT

## 2021-07-25 PROBLEM — N97.0 INFERTILITY DUE TO OLIGO-OVULATION: Status: ACTIVE | Noted: 2021-07-25

## 2021-07-25 PROBLEM — O09.01 PREGNANCY WITH HISTORY OF INFERTILITY IN FIRST TRIMESTER: Status: ACTIVE | Noted: 2021-07-25

## 2021-07-25 LAB
BACTERIA UR CULT: NORMAL
HBV SURFACE AG SER QL: NORMAL
RUBV IGG SERPL IA-ACNC: >175 IU/ML

## 2021-07-26 ENCOUNTER — TELEPHONE (OUTPATIENT)
Dept: PERINATAL CARE | Facility: CLINIC | Age: 31
End: 2021-07-26

## 2021-07-26 ENCOUNTER — INITIAL PRENATAL (OUTPATIENT)
Dept: OBGYN CLINIC | Facility: CLINIC | Age: 31
End: 2021-07-26

## 2021-07-26 VITALS — SYSTOLIC BLOOD PRESSURE: 140 MMHG | BODY MASS INDEX: 32.41 KG/M2 | WEIGHT: 200.8 LBS | DIASTOLIC BLOOD PRESSURE: 82 MMHG

## 2021-07-26 DIAGNOSIS — Z11.3 SCREENING EXAMINATION FOR STD (SEXUALLY TRANSMITTED DISEASE): ICD-10-CM

## 2021-07-26 DIAGNOSIS — O99.281 HYPOTHYROIDISM DURING PREGNANCY IN FIRST TRIMESTER: ICD-10-CM

## 2021-07-26 DIAGNOSIS — Z34.81 PRENATAL CARE, SUBSEQUENT PREGNANCY, FIRST TRIMESTER: Primary | ICD-10-CM

## 2021-07-26 DIAGNOSIS — E03.9 HYPOTHYROIDISM DURING PREGNANCY IN FIRST TRIMESTER: ICD-10-CM

## 2021-07-26 LAB
HIV 1+2 AB+HIV1 P24 AG SERPL QL IA: NORMAL
RPR SER QL: NORMAL
SL AMB  POCT GLUCOSE, UA: ABNORMAL
SL AMB POCT URINE PROTEIN: ABNORMAL

## 2021-07-26 PROCEDURE — G0476 HPV COMBO ASSAY CA SCREEN: HCPCS | Performed by: STUDENT IN AN ORGANIZED HEALTH CARE EDUCATION/TRAINING PROGRAM

## 2021-07-26 PROCEDURE — G0145 SCR C/V CYTO,THINLAYER,RESCR: HCPCS | Performed by: STUDENT IN AN ORGANIZED HEALTH CARE EDUCATION/TRAINING PROGRAM

## 2021-07-26 PROCEDURE — 87591 N.GONORRHOEAE DNA AMP PROB: CPT | Performed by: STUDENT IN AN ORGANIZED HEALTH CARE EDUCATION/TRAINING PROGRAM

## 2021-07-26 PROCEDURE — PNV: Performed by: STUDENT IN AN ORGANIZED HEALTH CARE EDUCATION/TRAINING PROGRAM

## 2021-07-26 PROCEDURE — 87491 CHLMYD TRACH DNA AMP PROBE: CPT | Performed by: STUDENT IN AN ORGANIZED HEALTH CARE EDUCATION/TRAINING PROGRAM

## 2021-07-26 NOTE — TELEPHONE ENCOUNTER
Called [facility] to get info, per staff message:    Anabel Osman MD  [address]  Please ask Betsy Nettles where Patient had IVF to let us know if the patient has a diagnosis of subclinical hypothyroidism or true hypothyroidism so we know whether she needs to be on medication for the rest of her life or just through pregnancy  Left voicemail for Betsy Nettles ([phone]) to please call back with information above

## 2021-07-26 NOTE — PROGRESS NOTES
Mason Newton is here for her first prenatal visit  Age: 27 y o  LMP: Patient's last menstrual period was 2021  Gestational age 14w11d based on IUI dating    2  Para 0010             She denies nausea and vomiting  She has had no vaginal bleeding  Patients has no complaints at this time    She had consultation with maternal fetal medicine and is working on logistics of NIPT    Allergies: Co q10 [ubidecarenone]  Medication use :   Current Outpatient Medications   Medication Sig Dispense Refill    Cholecalciferol (Vitamin D3) 50 MCG ( UT) TABS Take 2,000 Units by mouth daily      levothyroxine 50 mcg tablet Take 50 mcg by mouth daily      metFORMIN (GLUCOPHAGE-XR) 500 mg 24 hr tablet 500 mg 3 (three) times a day       progesterone (ENDOMETRIN) 100 MG vaginal insert Insert 400 mg into the vagina daily at bedtime       No current facility-administered medications for this visit  She is a non-smoker  In this pregnancy her medical history is significant for subclinical hypothyroidism    Prenatal Labs   A positive  Antibody negative  CBC 13 3/39 3<277   Hep B nonreactive  HIV nonreactive  RPR NR   Rubella immune  UCx neg  GCCT and Pap collected today       Her obstetrical, medical, surgical and family history was reviewed  Her physical exam was normal  A Pap smear was obtained, Gonorrhea and Chlamydia testing was obtained    Discussed as well during this visit was diet, prenatal vitamins, prenatal visits, lab testing, breast feeding, vaccinations, maternal fetal medicine consultations, prevention of exposure to infectious diseases and toxic chemicals, lifestyle      Risk factors for this pregnancy include:   -IUI pregnancy  -subclinical hypothyroidism: TSH and FT4 added to prenatal labs today  -prepreg BMI 32

## 2021-07-28 LAB
HPV HR 12 DNA CVX QL NAA+PROBE: NEGATIVE
HPV16 DNA CVX QL NAA+PROBE: NEGATIVE
HPV18 DNA CVX QL NAA+PROBE: NEGATIVE

## 2021-07-29 LAB
C TRACH DNA SPEC QL NAA+PROBE: NEGATIVE
N GONORRHOEA DNA SPEC QL NAA+PROBE: NEGATIVE

## 2021-07-31 ENCOUNTER — APPOINTMENT (OUTPATIENT)
Dept: LAB | Facility: HOSPITAL | Age: 31
End: 2021-07-31
Payer: COMMERCIAL

## 2021-07-31 DIAGNOSIS — O99.281 HYPOTHYROIDISM DURING PREGNANCY IN FIRST TRIMESTER: ICD-10-CM

## 2021-07-31 DIAGNOSIS — Z34.81 PRENATAL CARE, SUBSEQUENT PREGNANCY, FIRST TRIMESTER: ICD-10-CM

## 2021-07-31 DIAGNOSIS — E03.9 HYPOTHYROIDISM DURING PREGNANCY IN FIRST TRIMESTER: ICD-10-CM

## 2021-07-31 LAB
GLUCOSE 1H P 50 G GLC PO SERPL-MCNC: 80 MG/DL (ref 40–134)
TSH SERPL DL<=0.05 MIU/L-ACNC: 1.1 UIU/ML (ref 0.36–3.74)

## 2021-07-31 PROCEDURE — 82950 GLUCOSE TEST: CPT

## 2021-07-31 PROCEDURE — 84443 ASSAY THYROID STIM HORMONE: CPT

## 2021-07-31 PROCEDURE — 84439 ASSAY OF FREE THYROXINE: CPT

## 2021-07-31 PROCEDURE — 36415 COLL VENOUS BLD VENIPUNCTURE: CPT

## 2021-08-01 LAB — T4 FREE SERPL-MCNC: 0.95 NG/DL (ref 0.76–1.46)

## 2021-08-02 LAB
LAB AP GYN PRIMARY INTERPRETATION: NORMAL
Lab: NORMAL

## 2021-08-10 PROBLEM — E03.8 SUBCLINICAL HYPOTHYROIDISM: Status: ACTIVE | Noted: 2021-07-19

## 2021-08-12 ENCOUNTER — TELEPHONE (OUTPATIENT)
Dept: PERINATAL CARE | Facility: CLINIC | Age: 31
End: 2021-08-12

## 2021-08-12 ENCOUNTER — TELEPHONE (OUTPATIENT)
Dept: PERINATAL CARE | Facility: OTHER | Age: 31
End: 2021-08-12

## 2021-08-12 NOTE — TELEPHONE ENCOUNTER
Patient called office returning voicemail left for her  She explains she is using her primary insurance (she explains it is [employer]) and was told by [employer] she need prior authorization and she would like to know if our office is working on this  I explained to the patient there wasn't a prior authorization initiated up to this point and after confirming with Adriana Patel, told the patient that [facility] would initiate this if needed  Patient verbalizes understanding and agree to call [facility] office if any issues with prior authorization arises

## 2021-08-12 NOTE — TELEPHONE ENCOUNTER
JOE appt 07/19 patient given lab slip for [facility]   No results in Epic , no result in [facility] portal   Phone call to patient to follow up if lab drawn or if patient has any questions regarding screen  Laura Cartagena # for [facility] nurse line [phone]

## 2021-08-22 PROBLEM — Z34.90 PRENATAL CARE, ANTEPARTUM: Status: ACTIVE | Noted: 2021-08-22

## 2021-08-22 NOTE — PATIENT INSTRUCTIONS
Pregnancy at 23 to 22 [address]:   What changes are happening in my body? Now that you are in your second trimester, you have more energy  You may also be feeling hungrier than usual  You may be gaining about ½ to 1 pound a week, and your pregnancy is beginning to show  You may need to start wearing maternity clothes  As your baby gets larger, you may have other symptoms  These may include body aches or stretch marks on your abdomen, breasts, thighs, or buttocks  How do I care for myself at this stage of my pregnancy? · Eat a variety of healthy foods  Healthy foods include fruits, vegetables, whole-grain breads, low-fat dairy foods, beans, lean meats, and fish  Drink liquids as directed  Ask how much liquid to drink each day and which liquids are best for you  Limit caffeine to less than 200 milligrams each day  Limit your intake of fish to 2 servings each week  Choose fish low in mercury such as canned light tuna, shrimp, salmon, cod, or tilapia  Do not  eat fish high in mercury such as swordfish, tilefish, roland mackerel, and shark  · Take prenatal vitamins as directed  Your need for certain vitamins and minerals, such as folic acid, increases during pregnancy  Prenatal vitamins provide some of the extra vitamins and minerals you need  Prenatal vitamins may also help to decrease the risk of certain birth defects  · Talk to your healthcare provider about exercise  Moderate exercise can help you stay fit  Your healthcare provider will help you plan an exercise program that is safe for you during pregnancy  · Do not smoke  Smoking increases your risk of a miscarriage and other health problems during your pregnancy  Smoking can cause your baby to be born too early or weigh less at birth  Ask your healthcare provider for information if you need help quitting  · Do not drink alcohol  Alcohol passes from your body to your baby through the placenta   It can affect your baby's brain development and cause fetal alcohol syndrome (FAS)  FAS is a group of conditions that causes mental, behavior, and growth problems  · Talk to your healthcare provider before you take any medicines  Many medicines may harm your baby if you take them when you are pregnant  Do not take any medicines, vitamins, herbs, or supplements without first talking to your healthcare provider  Never use illegal or street drugs (such as marijuana or cocaine) while you are pregnant  What are some safety tips during pregnancy? · Avoid hot tubs and saunas  Do not use a hot tub or sauna while you are pregnant, especially during your first trimester  Hot tubs and saunas may raise your baby's temperature and increase the risk of birth defects  · Avoid toxoplasmosis  This is an infection caused by eating raw meat or being around infected cat feces  It can cause birth defects, miscarriages, and other problems  Wash your hands after you touch raw meat  Make sure any meat is well-cooked before you eat it  Avoid raw eggs and unpasteurized milk  Use gloves or ask someone else to clean your cat's litter box while you are pregnant  What changes are happening with my baby? By 22 weeks, your baby is about 8 inches long from the top of the head to the rump (baby's bottom)  Your baby also weighs about 1 pound  Your baby is becoming much more active  You may be able to feel the baby move inside you now  The first movements may not be that noticeable  They may feel like a fluttering sensation  As time goes on, your baby's movements will become stronger and more noticeable  What do I need to know about prenatal care? During the first 28 weeks of your pregnancy, you will see your healthcare provider once a month  Your healthcare provider will check your blood pressure and weight  You may also need the following:  · A urine test  may also be done to check for sugar and protein   These can be signs of gestational diabetes or infection  Protein in your urine may also be a sign of preeclampsia  Preeclampsia is a condition that can develop during week 20 or later of your pregnancy  It causes high blood pressure, and it can cause problems with your kidneys and other organs  · Fundal height  is a measurement of your uterus to check your baby's growth  This number is usually the same as the number of weeks that you have been pregnant  · A fetal ultrasound  shows pictures of your baby inside your uterus  It shows your baby's development  The movement and position of your baby can also be seen  Your healthcare provider may be able to tell you what your baby's gender is during the ultrasound  · Your baby's heart rate  will be checked  When should I seek immediate care? · You develop a severe headache that does not go away  · You have new or increased vision changes, such as blurred or spotted vision  · You have new or increased swelling in your face or hands  · You have vaginal spotting or bleeding  · Your water broke or you feel warm water gushing or trickling from your vagina  When should I contact my healthcare provider? · You have abdominal cramps, pressure, or tightening  · You have a change in vaginal discharge  · You cannot keep food or drinks down, and you are losing weight  · You have chills or a fever  · You have vaginal itching, burning, or pain  · You have yellow, green, white, or foul-smelling vaginal discharge  · You have pain or burning when you urinate, less urine than usual, or pink or bloody urine  · You have questions or concerns about your condition or care  CARE AGREEMENT:   You have the right to help plan your care  Learn about your health condition and how it may be treated  Discuss treatment options with your healthcare providers to decide what care you want to receive  You always have the right to refuse treatment   The above information is an  only  It is not intended as medical advice for individual conditions or treatments  Talk to your doctor, nurse or pharmacist before following any medical regimen to see if it is safe and effective for you  © Copyright [employer] 2021 Information is for End User's use only and may not be sold, redistributed or otherwise used for commercial purposes   All illustrations and images included in CareNotes® are the copyrighted property of A D A M , Inc  or [address] [place] [address]

## 2021-08-23 ENCOUNTER — ROUTINE PRENATAL (OUTPATIENT)
Dept: OBGYN CLINIC | Facility: CLINIC | Age: 31
End: 2021-08-23

## 2021-08-23 VITALS
SYSTOLIC BLOOD PRESSURE: 114 MMHG | DIASTOLIC BLOOD PRESSURE: 70 MMHG | WEIGHT: 206.6 LBS | HEIGHT: 66 IN | BODY MASS INDEX: 33.2 KG/M2

## 2021-08-23 DIAGNOSIS — E03.8 SUBCLINICAL HYPOTHYROIDISM: ICD-10-CM

## 2021-08-23 DIAGNOSIS — O09.01 SUPERVISION OF HIGH-RISK PREGNANCY WITH HISTORY OF INFERTILITY IN FIRST TRIMESTER: ICD-10-CM

## 2021-08-23 DIAGNOSIS — Z34.90 PRENATAL CARE, ANTEPARTUM: Primary | ICD-10-CM

## 2021-08-23 DIAGNOSIS — O09.01 PREGNANCY WITH HISTORY OF INFERTILITY IN FIRST TRIMESTER: ICD-10-CM

## 2021-08-23 PROBLEM — N92.6 IRREGULAR MENSES: Status: RESOLVED | Noted: 2019-04-25 | Resolved: 2021-08-23

## 2021-08-23 LAB
SL AMB  POCT GLUCOSE, UA: NEGATIVE
SL AMB POCT URINE PROTEIN: NEGATIVE

## 2021-08-23 PROCEDURE — PNV: Performed by: NURSE PRACTITIONER

## 2021-08-23 NOTE — ASSESSMENT & PLAN NOTE
Return OB  Denies LOF, vag blding, ctxs, cramping and reports good FM-"flutters" occasionally  Taking PNV/ASA/Levothyroxine/Progesterone daily as prescribed  Denies any problems today  Reviewed with patient how to access classes on the Baby and Me website and the [address]  Reviewed SABr precautions

## 2021-09-02 ENCOUNTER — TELEPHONE (OUTPATIENT)
Dept: OBGYN CLINIC | Facility: CLINIC | Age: 31
End: 2021-09-02

## 2021-09-02 DIAGNOSIS — E03.8 SUBCLINICAL HYPOTHYROIDISM: Primary | ICD-10-CM

## 2021-09-02 RX ORDER — LEVOTHYROXINE SODIUM 0.05 MG/1
50 TABLET ORAL DAILY
Qty: 30 TABLET | Refills: 3 | Status: SHIPPED | OUTPATIENT
Start: 2021-09-02 | End: 2022-01-10 | Stop reason: SDUPTHER

## 2021-09-02 NOTE — TELEPHONE ENCOUNTER
Pt lm on nurses line that she was getting levothyroxine from fertility doc of 50mcg and wondering if we can put in refills for her for this  If not she said let her know

## 2021-09-02 NOTE — TELEPHONE ENCOUNTER
No problem, rx sent  Just let pt know that we will follow the plan of checking her TSH level every trimester  Thanks!

## 2021-09-12 NOTE — PROGRESS NOTES
The patient was seen today for an ultrasound  Please see ultrasound report (located under Ob Procedures) for additional details  Thank you very much for allowing us to participate in the care of this very nice patient  Should you have any questions, please do not hesitate to contact me  Christopher P Robie Alpers, MD [address]  Attending Physician, Colleen

## 2021-09-13 ENCOUNTER — ROUTINE PRENATAL (OUTPATIENT)
Dept: PERINATAL CARE | Facility: CLINIC | Age: 31
End: 2021-09-13
Payer: COMMERCIAL

## 2021-09-13 VITALS
DIASTOLIC BLOOD PRESSURE: 64 MMHG | WEIGHT: 212.6 LBS | HEART RATE: 95 BPM | BODY MASS INDEX: 34.17 KG/M2 | SYSTOLIC BLOOD PRESSURE: 127 MMHG | HEIGHT: 66 IN

## 2021-09-13 DIAGNOSIS — O99.212 OBESITY AFFECTING PREGNANCY IN SECOND TRIMESTER: Primary | ICD-10-CM

## 2021-09-13 DIAGNOSIS — Z34.81 PRENATAL CARE, SUBSEQUENT PREGNANCY, FIRST TRIMESTER: ICD-10-CM

## 2021-09-13 DIAGNOSIS — Z36.3 ENCOUNTER FOR ANTENATAL SCREENING FOR MALFORMATION USING ULTRASOUND: ICD-10-CM

## 2021-09-13 DIAGNOSIS — Z3A.20 20 WEEKS GESTATION OF PREGNANCY: ICD-10-CM

## 2021-09-13 DIAGNOSIS — Z36.86 ENCOUNTER FOR ANTENATAL SCREENING FOR CERVICAL LENGTH: ICD-10-CM

## 2021-09-13 PROCEDURE — 76811 OB US DETAILED SNGL FETUS: CPT | Performed by: OBSTETRICS & GYNECOLOGY

## 2021-09-13 PROCEDURE — 76817 TRANSVAGINAL US OBSTETRIC: CPT | Performed by: OBSTETRICS & GYNECOLOGY

## 2021-09-13 PROCEDURE — 99213 OFFICE O/P EST LOW 20 MIN: CPT | Performed by: OBSTETRICS & GYNECOLOGY

## 2021-09-13 NOTE — PROGRESS NOTES
S/W patient she has Qnatal lab slip and knows how to do online appt scheduling for [facility]  Plans to go his week for blood owrk

## 2021-09-13 NOTE — PROGRESS NOTES
Ultrasound Probe Disinfection    A transvaginal ultrasound was performed  Prior to use, disinfection was performed with High Level Disinfection Process (Trophon)  Probe serial number [id number]: [id number] was used        Sandra Doll  09/13/21  8:47 AM

## 2021-09-20 ENCOUNTER — TELEPHONE (OUTPATIENT)
Dept: OBGYN CLINIC | Age: 31
End: 2021-09-20

## 2021-09-20 ENCOUNTER — TELEPHONE (OUTPATIENT)
Dept: OBGYN CLINIC | Facility: CLINIC | Age: 31
End: 2021-09-20

## 2021-09-20 NOTE — TELEPHONE ENCOUNTER
I called pt and reviewed dosing and pharmacy to send it to  Compounding pharmacy near [facility] on [address] [place]  Pt happy with call

## 2021-09-20 NOTE — TELEPHONE ENCOUNTER
----- Message from Dong Rodriges sent at 2021  2:35 PM EDT -----  Regarding: Prescription Question  Contact: [phone]  Fernando Hahn! I hope you're enjoying your weekend! I just have a quick question regarding my progesterone suppositories  I know we had discussed progesterone in my last visit and you had said it's not a big deal to continue taking it- I had originally had an ongoing prescription through my fertility clinic which is now  since I'm no longer a patient of theirs  Is that something that you'd be able to fill for me?  Thank You in advance, have a wonderful rest of your weekend!    -Dong Rodriges

## 2021-09-21 NOTE — PATIENT INSTRUCTIONS
Valuable Online Resource:    [facility] essential guide    [email or web address] [email or web address]      On the right side of the screen is a 50 page guide providing valuable information about your entire pregnancy  On the left hand side of the site you will see several other links to great information and resources that [facility] offers     If you click on the tab that says "Pregnancy and Birth Packet" this opens another 150 page guide to labor and delivery information as well as breast feeding information,  care, pediatricians, car seat safety and much more     The [facility] and [address] tab has a virtual tour of the new L&D unit, as well as valuable information about classes that are offered, breast feeding support, support groups and much more  Click around and enjoy all we have to offer! Please note that all information in regards to locations and visiting hours have not been updated due to COVID    We only deliver our babies at one location which is at [facility] [address]      Pregnancy at 21 to [address]:   What changes are happening to your body: You are now close to or at the beginning of the third trimester  The third trimester starts at 24 weeks and ends with delivery  As your baby gets larger, you may develop certain symptoms  These may include pain in your back or down the sides of your abdomen  You may also have stretch marks on your abdomen, breasts, thighs, or buttocks  You may also have constipation  Seek care immediately if:   · You develop a severe headache that does not go away  · You have new or increased vision changes, such as blurred or spotted vision  · You have new or increased swelling in your face or hands  · You have vaginal spotting or bleeding  · Your water broke or you feel warm water gushing or trickling from your vagina      Contact your healthcare provider if:   · You have abdominal cramps, pressure, or tightening  · You have a change in vaginal discharge  · You have light bleeding  · You have chills or a fever  · You have vaginal itching, burning, or pain  · You have yellow, green, white, or foul-smelling vaginal discharge  · You have pain or burning when you urinate, less urine than usual, or pink or bloody urine  · You have questions or concerns about your condition or care  How to care for yourself at this stage of your pregnancy:   · Eat a variety of healthy foods  Healthy foods include fruits, vegetables, whole-grain breads, low-fat dairy foods, beans, lean meats, and fish  Drink liquids as directed  Ask how much liquid to drink each day and which liquids are best for you  Limit caffeine to less than 200 milligrams each day  Limit your intake of fish to 2 servings each week  Choose fish low in mercury such as canned light tuna, shrimp, salmon, cod, or tilapia  Do not  eat fish high in mercury such as swordfish, tilefish, roland mackerel, and shark  · Manage back pain  Do not stand for long periods of time or lift heavy items  Use good posture while you stand, squat, or bend  Wear low-heeled shoes with good support  Rest may also help to relieve back pain  Ask your healthcare provider about exercises you can do to strengthen your back muscles  · Take prenatal vitamins as directed  Your need for certain vitamins and minerals, such as folic acid, increases during pregnancy  Prenatal vitamins provide some of the extra vitamins and minerals you need  Prenatal vitamins may also help to decrease the risk of certain birth defects  · Talk to your healthcare provider about exercise  Moderate exercise can help you stay fit  Your healthcare provider will help you plan an exercise program that is safe for you during pregnancy  · Do not smoke    Smoking increases your risk of a miscarriage and other health problems during your pregnancy  Smoking can cause your baby to be born too early or weigh less at birth  Ask your healthcare provider for information if you need help quitting  · Do not drink alcohol  Alcohol passes from your body to your baby through the placenta  It can affect your baby's brain development and cause fetal alcohol syndrome (FAS)  FAS is a group of conditions that causes mental, behavior, and growth problems  · Talk to your healthcare provider before you take any medicines  Many medicines may harm your baby if you take them when you are pregnant  Do not take any medicines, vitamins, herbs, or supplements without first talking to your healthcare provider  Never use illegal or street drugs (such as marijuana or cocaine) while you are pregnant  Safety tips during pregnancy:   · Avoid hot tubs and saunas  Do not use a hot tub or sauna while you are pregnant, especially during your first trimester  Hot tubs and saunas may raise your baby's temperature and increase the risk of birth defects  · Avoid toxoplasmosis  This is an infection caused by eating raw meat or being around infected cat feces  It can cause birth defects, miscarriages, and other problems  Wash your hands after you touch raw meat  Make sure any meat is well-cooked before you eat it  Avoid raw eggs and unpasteurized milk  Use gloves or ask someone else to clean your cat's litter box while you are pregnant  Changes that are happening with your baby:  By 26 weeks, your baby will weigh about 2 pounds  Your baby will be about 10 inches long from the top of the head to the rump (baby's bottom)  Your baby's movements are much stronger now  Your baby's eyes are almost completely formed and can partially open  Your baby also sleeps and wakes up  What you need to know about prenatal care: Your healthcare provider will check your blood pressure and weight   You may also need the following:  · A urine test  may also be done to check for sugar and protein  These can be signs of gestational diabetes or infection  Protein in your urine may also be a sign of preeclampsia  Preeclampsia is a condition that can develop during week 20 or later of your pregnancy  It causes high blood pressure, and it can cause problems with your kidneys and other organs  · Fundal height  is a measurement of your uterus to check your baby's growth  This number is usually the same as the number of weeks that you have been pregnant  · Your baby's heart rate  will be checked  © Copyright [employer] 2021 Information is for End User's use only and may not be sold, redistributed or otherwise used for commercial purposes  All illustrations and images included in CareNotes® are the copyrighted property of A D A M , Inc  or [facility] Yaneli Cazares   The above information is an  only  It is not intended as medical advice for individual conditions or treatments  Talk to your doctor, nurse or pharmacist before following any medical regimen to see if it is safe and effective for you

## 2021-09-21 NOTE — ASSESSMENT & PLAN NOTE
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

## 2021-09-24 ENCOUNTER — ROUTINE PRENATAL (OUTPATIENT)
Dept: OBGYN CLINIC | Facility: CLINIC | Age: 31
End: 2021-09-24

## 2021-09-24 VITALS
SYSTOLIC BLOOD PRESSURE: 122 MMHG | DIASTOLIC BLOOD PRESSURE: 68 MMHG | HEIGHT: 66 IN | WEIGHT: 218 LBS | BODY MASS INDEX: 35.03 KG/M2

## 2021-09-24 DIAGNOSIS — Z34.02 ENCOUNTER FOR SUPERVISION OF NORMAL FIRST PREGNANCY IN SECOND TRIMESTER: Primary | ICD-10-CM

## 2021-09-24 LAB
SL AMB  POCT GLUCOSE, UA: NEGATIVE
SL AMB POCT URINE PROTEIN: NEGATIVE

## 2021-09-24 PROCEDURE — PNV: Performed by: OBSTETRICS & GYNECOLOGY

## 2021-09-24 NOTE — PROGRESS NOTES
Patient is here for her routine prenatal visit she is 22 weeks 3 days very happy with pregnancy   Patient reports positive fetal movements and reports NO contraction and No lost fluids and No vaginal bleeding at this time   Patient is planning to breast feed the baby this is her first baby

## 2021-09-24 NOTE — PROGRESS NOTES
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

## 2021-09-30 LAB
# FETUSES US: 1
CFDNA.FET/TOTAL PLAS.CFDNA: NORMAL
FET CHR 13 TS PLAS.CFDNA QL: NEGATIVE
FET CHR 18 TS PLAS.CFDNA QL: NEGATIVE
FET CHR 21 TS PLAS.CFDNA QL: NEGATIVE
FET CHR X + Y ANEUP PLAS.CFDNA QL: NORMAL
FET CHROM X + Y ANEUP CFDNA IMP: NORMAL
FET Y CHROM PLAS.CFDNA QL: NOT DETECTED
FET Y CHROM PLAS.CFDNA: NORMAL
GA (DAYS): 3 D
GA (WEEKS): 22 WK
MICRODELETION SYND BLD/T FISH: NORMAL
REF LAB TEST METHOD: NORMAL
SERVICE CMNT-IMP: NORMAL
SERVICE CMNT-IMP: NORMAL
SL AMB ABNORMAL MSS?: NORMAL
SL AMB ABNORMAL US?: NORMAL
SL AMB ADVANCED MATERNAL AGE?: NORMAL
SL AMB MICRODELETION INTERP: NORMAL
SL AMB MICRODELETION: NOT DETECTED
SL AMB PERSONAL/FAM HISTORY?: NORMAL
SL AMB SPECIFICATIONS: NORMAL

## 2021-10-25 ENCOUNTER — ROUTINE PRENATAL (OUTPATIENT)
Dept: OBGYN CLINIC | Age: 31
End: 2021-10-25

## 2021-10-25 VITALS — BODY MASS INDEX: 36.74 KG/M2 | WEIGHT: 227.6 LBS | DIASTOLIC BLOOD PRESSURE: 76 MMHG | SYSTOLIC BLOOD PRESSURE: 120 MMHG

## 2021-10-25 DIAGNOSIS — Z34.82 ENCOUNTER FOR SUPERVISION OF OTHER NORMAL PREGNANCY IN SECOND TRIMESTER: Primary | ICD-10-CM

## 2021-10-25 DIAGNOSIS — O09.01 SUPERVISION OF HIGH-RISK PREGNANCY WITH HISTORY OF INFERTILITY IN FIRST TRIMESTER: ICD-10-CM

## 2021-10-25 LAB
SL AMB  POCT GLUCOSE, UA: NEGATIVE
SL AMB POCT URINE PROTEIN: NEGATIVE

## 2021-10-25 PROCEDURE — PNV: Performed by: STUDENT IN AN ORGANIZED HEALTH CARE EDUCATION/TRAINING PROGRAM

## 2021-10-29 ENCOUNTER — APPOINTMENT (OUTPATIENT)
Dept: LAB | Facility: HOSPITAL | Age: 31
End: 2021-10-29
Payer: COMMERCIAL

## 2021-10-29 ENCOUNTER — NURSE TRIAGE (OUTPATIENT)
Dept: OTHER | Facility: OTHER | Age: 31
End: 2021-10-29

## 2021-10-29 DIAGNOSIS — Z34.82 ENCOUNTER FOR SUPERVISION OF OTHER NORMAL PREGNANCY IN SECOND TRIMESTER: ICD-10-CM

## 2021-10-29 LAB
BASOPHILS # BLD AUTO: 0.04 THOUSANDS/ΜL (ref 0–0.1)
BASOPHILS NFR BLD AUTO: 0 % (ref 0–1)
EOSINOPHIL # BLD AUTO: 0.14 THOUSAND/ΜL (ref 0–0.61)
EOSINOPHIL NFR BLD AUTO: 1 % (ref 0–6)
ERYTHROCYTE [DISTWIDTH] IN BLOOD BY AUTOMATED COUNT: 13.5 % (ref 11.6–15.1)
GLUCOSE 1H P 50 G GLC PO SERPL-MCNC: 79 MG/DL (ref 40–134)
HCT VFR BLD AUTO: 37.1 % (ref 34.8–46.1)
HGB BLD-MCNC: 12.3 G/DL (ref 11.5–15.4)
IMM GRANULOCYTES # BLD AUTO: 0.17 THOUSAND/UL (ref 0–0.2)
IMM GRANULOCYTES NFR BLD AUTO: 2 % (ref 0–2)
LYMPHOCYTES # BLD AUTO: 1.38 THOUSANDS/ΜL (ref 0.6–4.47)
LYMPHOCYTES NFR BLD AUTO: 13 % (ref 14–44)
MCH RBC QN AUTO: 30 PG (ref 26.8–34.3)
MCHC RBC AUTO-ENTMCNC: 33.2 G/DL (ref 31.4–37.4)
MCV RBC AUTO: 91 FL (ref 82–98)
MONOCYTES # BLD AUTO: 0.69 THOUSAND/ΜL (ref 0.17–1.22)
MONOCYTES NFR BLD AUTO: 7 % (ref 4–12)
NEUTROPHILS # BLD AUTO: 7.91 THOUSANDS/ΜL (ref 1.85–7.62)
NEUTS SEG NFR BLD AUTO: 77 % (ref 43–75)
NRBC BLD AUTO-RTO: 0 /100 WBCS
PLATELET # BLD AUTO: 187 THOUSANDS/UL (ref 149–390)
PMV BLD AUTO: 11.1 FL (ref 8.9–12.7)
RBC # BLD AUTO: 4.1 MILLION/UL (ref 3.81–5.12)
WBC # BLD AUTO: 10.33 THOUSAND/UL (ref 4.31–10.16)

## 2021-10-29 PROCEDURE — 85025 COMPLETE CBC W/AUTO DIFF WBC: CPT

## 2021-10-29 PROCEDURE — 36415 COLL VENOUS BLD VENIPUNCTURE: CPT

## 2021-10-29 PROCEDURE — 82950 GLUCOSE TEST: CPT

## 2021-10-29 PROCEDURE — 86592 SYPHILIS TEST NON-TREP QUAL: CPT

## 2021-11-01 LAB — RPR SER QL: NORMAL

## 2021-11-05 ENCOUNTER — ROUTINE PRENATAL (OUTPATIENT)
Dept: OBGYN CLINIC | Facility: MEDICAL CENTER | Age: 31
End: 2021-11-05
Payer: COMMERCIAL

## 2021-11-05 VITALS
DIASTOLIC BLOOD PRESSURE: 88 MMHG | BODY MASS INDEX: 36.96 KG/M2 | SYSTOLIC BLOOD PRESSURE: 140 MMHG | WEIGHT: 230 LBS | HEIGHT: 66 IN

## 2021-11-05 DIAGNOSIS — R03.0 ELEVATED BP WITHOUT DIAGNOSIS OF HYPERTENSION: ICD-10-CM

## 2021-11-05 DIAGNOSIS — Z23 NEED FOR TDAP VACCINATION: ICD-10-CM

## 2021-11-05 DIAGNOSIS — Z34.82 ENCOUNTER FOR SUPERVISION OF OTHER NORMAL PREGNANCY IN SECOND TRIMESTER: Primary | ICD-10-CM

## 2021-11-05 DIAGNOSIS — O09.01 PREGNANCY WITH HISTORY OF INFERTILITY IN FIRST TRIMESTER: ICD-10-CM

## 2021-11-05 DIAGNOSIS — E03.8 SUBCLINICAL HYPOTHYROIDISM: ICD-10-CM

## 2021-11-05 DIAGNOSIS — O09.01 SUPERVISION OF HIGH-RISK PREGNANCY WITH HISTORY OF INFERTILITY IN FIRST TRIMESTER: ICD-10-CM

## 2021-11-05 DIAGNOSIS — Z23 NEED FOR INFLUENZA VACCINATION: ICD-10-CM

## 2021-11-05 LAB
SL AMB  POCT GLUCOSE, UA: NEGATIVE
SL AMB POCT URINE PROTEIN: NORMAL

## 2021-11-05 PROCEDURE — 90472 IMMUNIZATION ADMIN EACH ADD: CPT | Performed by: STUDENT IN AN ORGANIZED HEALTH CARE EDUCATION/TRAINING PROGRAM

## 2021-11-05 PROCEDURE — 90686 IIV4 VACC NO PRSV 0.5 ML IM: CPT | Performed by: STUDENT IN AN ORGANIZED HEALTH CARE EDUCATION/TRAINING PROGRAM

## 2021-11-05 PROCEDURE — PNV: Performed by: STUDENT IN AN ORGANIZED HEALTH CARE EDUCATION/TRAINING PROGRAM

## 2021-11-05 PROCEDURE — 90471 IMMUNIZATION ADMIN: CPT | Performed by: STUDENT IN AN ORGANIZED HEALTH CARE EDUCATION/TRAINING PROGRAM

## 2021-11-05 PROCEDURE — 90715 TDAP VACCINE 7 YRS/> IM: CPT | Performed by: STUDENT IN AN ORGANIZED HEALTH CARE EDUCATION/TRAINING PROGRAM

## 2021-11-19 ENCOUNTER — APPOINTMENT (OUTPATIENT)
Dept: LAB | Facility: HOSPITAL | Age: 31
End: 2021-11-19
Payer: COMMERCIAL

## 2021-11-19 ENCOUNTER — ROUTINE PRENATAL (OUTPATIENT)
Dept: OBGYN CLINIC | Facility: CLINIC | Age: 31
End: 2021-11-19

## 2021-11-19 VITALS
WEIGHT: 238.6 LBS | HEIGHT: 66 IN | DIASTOLIC BLOOD PRESSURE: 84 MMHG | SYSTOLIC BLOOD PRESSURE: 140 MMHG | BODY MASS INDEX: 38.35 KG/M2

## 2021-11-19 DIAGNOSIS — R03.0 ELEVATED BP WITHOUT DIAGNOSIS OF HYPERTENSION: ICD-10-CM

## 2021-11-19 DIAGNOSIS — O09.01 SUPERVISION OF HIGH-RISK PREGNANCY WITH HISTORY OF INFERTILITY IN FIRST TRIMESTER: ICD-10-CM

## 2021-11-19 DIAGNOSIS — Z34.90 PRENATAL CARE, ANTEPARTUM: Primary | ICD-10-CM

## 2021-11-19 DIAGNOSIS — E03.8 SUBCLINICAL HYPOTHYROIDISM: ICD-10-CM

## 2021-11-19 DIAGNOSIS — O09.01 PREGNANCY WITH HISTORY OF INFERTILITY IN FIRST TRIMESTER: ICD-10-CM

## 2021-11-19 LAB
ALBUMIN SERPL BCP-MCNC: 2.7 G/DL (ref 3.5–5)
ALP SERPL-CCNC: 98 U/L (ref 46–116)
ALT SERPL W P-5'-P-CCNC: 17 U/L (ref 12–78)
ANION GAP SERPL CALCULATED.3IONS-SCNC: 7 MMOL/L (ref 4–13)
AST SERPL W P-5'-P-CCNC: 12 U/L (ref 5–45)
BILIRUB SERPL-MCNC: 0.16 MG/DL (ref 0.2–1)
BUN SERPL-MCNC: 10 MG/DL (ref 5–25)
CALCIUM ALBUM COR SERPL-MCNC: 9.7 MG/DL (ref 8.3–10.1)
CALCIUM SERPL-MCNC: 8.7 MG/DL (ref 8.3–10.1)
CHLORIDE SERPL-SCNC: 104 MMOL/L (ref 100–108)
CO2 SERPL-SCNC: 26 MMOL/L (ref 21–32)
CREAT SERPL-MCNC: 0.71 MG/DL (ref 0.6–1.3)
CREAT UR-MCNC: 91 MG/DL
GFR SERPL CREATININE-BSD FRML MDRD: 113 ML/MIN/1.73SQ M
GLUCOSE SERPL-MCNC: 102 MG/DL (ref 65–140)
POTASSIUM SERPL-SCNC: 4 MMOL/L (ref 3.5–5.3)
PROT SERPL-MCNC: 6.8 G/DL (ref 6.4–8.2)
PROT UR-MCNC: 11 MG/DL
PROT/CREAT UR: 0.12 MG/G{CREAT} (ref 0–0.1)
SL AMB  POCT GLUCOSE, UA: NEGATIVE
SL AMB POCT URINE PROTEIN: NEGATIVE
SODIUM SERPL-SCNC: 137 MMOL/L (ref 136–145)
TSH SERPL DL<=0.05 MIU/L-ACNC: 1.04 UIU/ML (ref 0.36–3.74)

## 2021-11-19 PROCEDURE — 36415 COLL VENOUS BLD VENIPUNCTURE: CPT

## 2021-11-19 PROCEDURE — 80053 COMPREHEN METABOLIC PANEL: CPT

## 2021-11-19 PROCEDURE — 82570 ASSAY OF URINE CREATININE: CPT

## 2021-11-19 PROCEDURE — 84156 ASSAY OF PROTEIN URINE: CPT

## 2021-11-19 PROCEDURE — PNV: Performed by: NURSE PRACTITIONER

## 2021-11-19 PROCEDURE — 84443 ASSAY THYROID STIM HORMONE: CPT

## 2021-12-02 ENCOUNTER — TELEPHONE (OUTPATIENT)
Dept: OBGYN CLINIC | Facility: CLINIC | Age: 31
End: 2021-12-02

## 2021-12-03 ENCOUNTER — ULTRASOUND (OUTPATIENT)
Dept: PERINATAL CARE | Facility: CLINIC | Age: 31
End: 2021-12-03
Payer: COMMERCIAL

## 2021-12-03 VITALS
DIASTOLIC BLOOD PRESSURE: 86 MMHG | WEIGHT: 242 LBS | BODY MASS INDEX: 38.89 KG/M2 | SYSTOLIC BLOOD PRESSURE: 142 MMHG | HEIGHT: 66 IN | HEART RATE: 107 BPM

## 2021-12-03 DIAGNOSIS — O13.3 GESTATIONAL HYPERTENSION WITHOUT SIGNIFICANT PROTEINURIA IN THIRD TRIMESTER: Primary | ICD-10-CM

## 2021-12-03 DIAGNOSIS — Z3A.32 32 WEEKS GESTATION OF PREGNANCY: ICD-10-CM

## 2021-12-03 DIAGNOSIS — O99.213 OBESITY COMPLICATING PREGNANCY, THIRD TRIMESTER: ICD-10-CM

## 2021-12-03 PROCEDURE — 76816 OB US FOLLOW-UP PER FETUS: CPT | Performed by: OBSTETRICS & GYNECOLOGY

## 2021-12-06 ENCOUNTER — TELEPHONE (OUTPATIENT)
Dept: PERINATAL CARE | Facility: CLINIC | Age: 31
End: 2021-12-06

## 2021-12-07 ENCOUNTER — APPOINTMENT (OUTPATIENT)
Dept: LAB | Facility: HOSPITAL | Age: 31
End: 2021-12-07
Payer: COMMERCIAL

## 2021-12-07 LAB
ALBUMIN SERPL BCP-MCNC: 2.5 G/DL (ref 3.5–5)
ALP SERPL-CCNC: 109 U/L (ref 46–116)
ALT SERPL W P-5'-P-CCNC: 15 U/L (ref 12–78)
ANION GAP SERPL CALCULATED.3IONS-SCNC: 9 MMOL/L (ref 4–13)
AST SERPL W P-5'-P-CCNC: 13 U/L (ref 5–45)
BILIRUB SERPL-MCNC: 0.16 MG/DL (ref 0.2–1)
BUN SERPL-MCNC: 13 MG/DL (ref 5–25)
CALCIUM ALBUM COR SERPL-MCNC: 10 MG/DL (ref 8.3–10.1)
CALCIUM SERPL-MCNC: 8.8 MG/DL (ref 8.3–10.1)
CHLORIDE SERPL-SCNC: 106 MMOL/L (ref 100–108)
CO2 SERPL-SCNC: 26 MMOL/L (ref 21–32)
CREAT SERPL-MCNC: 0.62 MG/DL (ref 0.6–1.3)
CREAT UR-MCNC: 168 MG/DL
ERYTHROCYTE [DISTWIDTH] IN BLOOD BY AUTOMATED COUNT: 13.5 % (ref 11.6–15.1)
GFR SERPL CREATININE-BSD FRML MDRD: 120 ML/MIN/1.73SQ M
GLUCOSE SERPL-MCNC: 108 MG/DL (ref 65–140)
HCT VFR BLD AUTO: 37.8 % (ref 34.8–46.1)
HGB BLD-MCNC: 12.3 G/DL (ref 11.5–15.4)
LDH SERPL-CCNC: 139 U/L (ref 81–234)
MCH RBC QN AUTO: 30.6 PG (ref 26.8–34.3)
MCHC RBC AUTO-ENTMCNC: 32.5 G/DL (ref 31.4–37.4)
MCV RBC AUTO: 94 FL (ref 82–98)
PLATELET # BLD AUTO: 220 THOUSANDS/UL (ref 149–390)
POTASSIUM SERPL-SCNC: 4 MMOL/L (ref 3.5–5.3)
PROT SERPL-MCNC: 6.5 G/DL (ref 6.4–8.2)
PROT UR-MCNC: 21 MG/DL
PROT/CREAT UR: 0.13 MG/G{CREAT} (ref 0–0.1)
RBC # BLD AUTO: 4.02 MILLION/UL (ref 3.81–5.12)
SODIUM SERPL-SCNC: 141 MMOL/L (ref 136–145)
URATE SERPL-MCNC: 4.5 MG/DL (ref 2–6.8)
WBC # BLD AUTO: 10.86 THOUSAND/UL (ref 4.31–10.16)

## 2021-12-07 PROCEDURE — 83615 LACTATE (LD) (LDH) ENZYME: CPT | Performed by: OBSTETRICS & GYNECOLOGY

## 2021-12-07 PROCEDURE — 84550 ASSAY OF BLOOD/URIC ACID: CPT | Performed by: OBSTETRICS & GYNECOLOGY

## 2021-12-07 PROCEDURE — 80053 COMPREHEN METABOLIC PANEL: CPT | Performed by: OBSTETRICS & GYNECOLOGY

## 2021-12-07 PROCEDURE — 85027 COMPLETE CBC AUTOMATED: CPT | Performed by: OBSTETRICS & GYNECOLOGY

## 2021-12-07 PROCEDURE — 84156 ASSAY OF PROTEIN URINE: CPT | Performed by: OBSTETRICS & GYNECOLOGY

## 2021-12-07 PROCEDURE — 82570 ASSAY OF URINE CREATININE: CPT | Performed by: OBSTETRICS & GYNECOLOGY

## 2021-12-07 PROCEDURE — 36415 COLL VENOUS BLD VENIPUNCTURE: CPT | Performed by: OBSTETRICS & GYNECOLOGY

## 2021-12-09 PROBLEM — Z3A.33 33 WEEKS GESTATION OF PREGNANCY: Status: ACTIVE | Noted: 2021-12-09

## 2021-12-10 ENCOUNTER — ROUTINE PRENATAL (OUTPATIENT)
Dept: OBGYN CLINIC | Facility: CLINIC | Age: 31
End: 2021-12-10

## 2021-12-10 ENCOUNTER — ROUTINE PRENATAL (OUTPATIENT)
Dept: PERINATAL CARE | Facility: OTHER | Age: 31
End: 2021-12-10
Payer: COMMERCIAL

## 2021-12-10 VITALS
HEIGHT: 66 IN | SYSTOLIC BLOOD PRESSURE: 136 MMHG | DIASTOLIC BLOOD PRESSURE: 84 MMHG | WEIGHT: 246 LBS | BODY MASS INDEX: 39.53 KG/M2

## 2021-12-10 VITALS
HEIGHT: 66 IN | WEIGHT: 248 LBS | SYSTOLIC BLOOD PRESSURE: 113 MMHG | HEART RATE: 101 BPM | DIASTOLIC BLOOD PRESSURE: 88 MMHG | BODY MASS INDEX: 39.86 KG/M2

## 2021-12-10 DIAGNOSIS — O13.3 GESTATIONAL HTN, THIRD TRIMESTER: ICD-10-CM

## 2021-12-10 DIAGNOSIS — Z34.03 ENCOUNTER FOR SUPERVISION OF NORMAL FIRST PREGNANCY IN THIRD TRIMESTER: Primary | ICD-10-CM

## 2021-12-10 DIAGNOSIS — Z3A.33 33 WEEKS GESTATION OF PREGNANCY: ICD-10-CM

## 2021-12-10 LAB
SL AMB  POCT GLUCOSE, UA: NEGATIVE
SL AMB POCT URINE PROTEIN: NEGATIVE

## 2021-12-10 PROCEDURE — 76819 FETAL BIOPHYS PROFIL W/O NST: CPT | Performed by: OBSTETRICS & GYNECOLOGY

## 2021-12-10 PROCEDURE — PNV: Performed by: OBSTETRICS & GYNECOLOGY

## 2021-12-14 ENCOUNTER — APPOINTMENT (OUTPATIENT)
Dept: LAB | Facility: HOSPITAL | Age: 31
End: 2021-12-14
Payer: COMMERCIAL

## 2021-12-14 ENCOUNTER — ULTRASOUND (OUTPATIENT)
Dept: PERINATAL CARE | Facility: OTHER | Age: 31
End: 2021-12-14
Payer: COMMERCIAL

## 2021-12-14 ENCOUNTER — TELEPHONE (OUTPATIENT)
Dept: OBGYN CLINIC | Facility: CLINIC | Age: 31
End: 2021-12-14

## 2021-12-14 ENCOUNTER — ROUTINE PRENATAL (OUTPATIENT)
Dept: OBGYN CLINIC | Facility: CLINIC | Age: 31
End: 2021-12-14

## 2021-12-14 VITALS
HEIGHT: 66 IN | WEIGHT: 246.4 LBS | BODY MASS INDEX: 39.6 KG/M2 | DIASTOLIC BLOOD PRESSURE: 88 MMHG | SYSTOLIC BLOOD PRESSURE: 140 MMHG

## 2021-12-14 VITALS
WEIGHT: 246.2 LBS | HEART RATE: 108 BPM | BODY MASS INDEX: 39.57 KG/M2 | DIASTOLIC BLOOD PRESSURE: 90 MMHG | SYSTOLIC BLOOD PRESSURE: 136 MMHG | HEIGHT: 66 IN

## 2021-12-14 DIAGNOSIS — E03.8 SUBCLINICAL HYPOTHYROIDISM: ICD-10-CM

## 2021-12-14 DIAGNOSIS — Z3A.34 34 WEEKS GESTATION OF PREGNANCY: ICD-10-CM

## 2021-12-14 DIAGNOSIS — O13.3 GESTATIONAL HTN, THIRD TRIMESTER: ICD-10-CM

## 2021-12-14 DIAGNOSIS — O99.213 OBESITY COMPLICATING PREGNANCY, THIRD TRIMESTER: ICD-10-CM

## 2021-12-14 DIAGNOSIS — O13.3 GESTATIONAL HYPERTENSION WITHOUT SIGNIFICANT PROTEINURIA IN THIRD TRIMESTER: Primary | ICD-10-CM

## 2021-12-14 DIAGNOSIS — Z34.03 ENCOUNTER FOR SUPERVISION OF NORMAL FIRST PREGNANCY IN THIRD TRIMESTER: Primary | ICD-10-CM

## 2021-12-14 LAB
ALBUMIN SERPL BCP-MCNC: 2.5 G/DL (ref 3.5–5)
ALP SERPL-CCNC: 114 U/L (ref 46–116)
ALT SERPL W P-5'-P-CCNC: 16 U/L (ref 12–78)
ANION GAP SERPL CALCULATED.3IONS-SCNC: 11 MMOL/L (ref 4–13)
AST SERPL W P-5'-P-CCNC: 13 U/L (ref 5–45)
BILIRUB SERPL-MCNC: 0.17 MG/DL (ref 0.2–1)
BUN SERPL-MCNC: 12 MG/DL (ref 5–25)
CALCIUM ALBUM COR SERPL-MCNC: 10.3 MG/DL (ref 8.3–10.1)
CALCIUM SERPL-MCNC: 9.1 MG/DL (ref 8.3–10.1)
CHLORIDE SERPL-SCNC: 105 MMOL/L (ref 100–108)
CO2 SERPL-SCNC: 24 MMOL/L (ref 21–32)
CREAT SERPL-MCNC: 0.69 MG/DL (ref 0.6–1.3)
CREAT UR-MCNC: 157 MG/DL
ERYTHROCYTE [DISTWIDTH] IN BLOOD BY AUTOMATED COUNT: 13.8 % (ref 11.6–15.1)
GFR SERPL CREATININE-BSD FRML MDRD: 115 ML/MIN/1.73SQ M
GLUCOSE SERPL-MCNC: 98 MG/DL (ref 65–140)
HCT VFR BLD AUTO: 37.1 % (ref 34.8–46.1)
HGB BLD-MCNC: 12 G/DL (ref 11.5–15.4)
MCH RBC QN AUTO: 29.8 PG (ref 26.8–34.3)
MCHC RBC AUTO-ENTMCNC: 32.3 G/DL (ref 31.4–37.4)
MCV RBC AUTO: 92 FL (ref 82–98)
PLATELET # BLD AUTO: 225 THOUSANDS/UL (ref 149–390)
PMV BLD AUTO: 10.8 FL (ref 8.9–12.7)
POTASSIUM SERPL-SCNC: 4.1 MMOL/L (ref 3.5–5.3)
PROT SERPL-MCNC: 6.5 G/DL (ref 6.4–8.2)
PROT UR-MCNC: 15 MG/DL
PROT/CREAT UR: 0.1 MG/G{CREAT} (ref 0–0.1)
RBC # BLD AUTO: 4.03 MILLION/UL (ref 3.81–5.12)
SL AMB  POCT GLUCOSE, UA: NEGATIVE
SL AMB POCT URINE PROTEIN: NEGATIVE
SODIUM SERPL-SCNC: 140 MMOL/L (ref 136–145)
WBC # BLD AUTO: 10.32 THOUSAND/UL (ref 4.31–10.16)

## 2021-12-14 PROCEDURE — 80053 COMPREHEN METABOLIC PANEL: CPT | Performed by: STUDENT IN AN ORGANIZED HEALTH CARE EDUCATION/TRAINING PROGRAM

## 2021-12-14 PROCEDURE — 76815 OB US LIMITED FETUS(S): CPT | Performed by: OBSTETRICS & GYNECOLOGY

## 2021-12-14 PROCEDURE — 36415 COLL VENOUS BLD VENIPUNCTURE: CPT | Performed by: STUDENT IN AN ORGANIZED HEALTH CARE EDUCATION/TRAINING PROGRAM

## 2021-12-14 PROCEDURE — 82570 ASSAY OF URINE CREATININE: CPT | Performed by: STUDENT IN AN ORGANIZED HEALTH CARE EDUCATION/TRAINING PROGRAM

## 2021-12-14 PROCEDURE — 59025 FETAL NON-STRESS TEST: CPT | Performed by: OBSTETRICS & GYNECOLOGY

## 2021-12-14 PROCEDURE — PNV: Performed by: STUDENT IN AN ORGANIZED HEALTH CARE EDUCATION/TRAINING PROGRAM

## 2021-12-14 PROCEDURE — 85027 COMPLETE CBC AUTOMATED: CPT | Performed by: STUDENT IN AN ORGANIZED HEALTH CARE EDUCATION/TRAINING PROGRAM

## 2021-12-14 PROCEDURE — 84156 ASSAY OF PROTEIN URINE: CPT | Performed by: STUDENT IN AN ORGANIZED HEALTH CARE EDUCATION/TRAINING PROGRAM

## 2021-12-17 ENCOUNTER — ROUTINE PRENATAL (OUTPATIENT)
Dept: PERINATAL CARE | Facility: OTHER | Age: 31
End: 2021-12-17
Payer: COMMERCIAL

## 2021-12-17 VITALS
DIASTOLIC BLOOD PRESSURE: 82 MMHG | HEART RATE: 93 BPM | WEIGHT: 254.4 LBS | BODY MASS INDEX: 40.88 KG/M2 | SYSTOLIC BLOOD PRESSURE: 128 MMHG | HEIGHT: 66 IN

## 2021-12-17 DIAGNOSIS — Z3A.34 34 WEEKS GESTATION OF PREGNANCY: Primary | ICD-10-CM

## 2021-12-17 DIAGNOSIS — O13.3 GESTATIONAL HYPERTENSION WITHOUT SIGNIFICANT PROTEINURIA IN THIRD TRIMESTER: ICD-10-CM

## 2021-12-17 PROCEDURE — 59025 FETAL NON-STRESS TEST: CPT | Performed by: OBSTETRICS & GYNECOLOGY

## 2021-12-21 ENCOUNTER — ULTRASOUND (OUTPATIENT)
Dept: PERINATAL CARE | Facility: OTHER | Age: 31
End: 2021-12-21
Payer: COMMERCIAL

## 2021-12-21 ENCOUNTER — APPOINTMENT (OUTPATIENT)
Dept: LAB | Facility: HOSPITAL | Age: 31
End: 2021-12-21
Payer: COMMERCIAL

## 2021-12-21 ENCOUNTER — ROUTINE PRENATAL (OUTPATIENT)
Dept: OBGYN CLINIC | Age: 31
End: 2021-12-21

## 2021-12-21 VITALS — SYSTOLIC BLOOD PRESSURE: 138 MMHG | WEIGHT: 254 LBS | DIASTOLIC BLOOD PRESSURE: 88 MMHG | BODY MASS INDEX: 41 KG/M2

## 2021-12-21 VITALS
HEIGHT: 66 IN | BODY MASS INDEX: 40.53 KG/M2 | DIASTOLIC BLOOD PRESSURE: 90 MMHG | WEIGHT: 252.2 LBS | SYSTOLIC BLOOD PRESSURE: 135 MMHG | HEART RATE: 96 BPM

## 2021-12-21 DIAGNOSIS — Z3A.35 35 WEEKS GESTATION OF PREGNANCY: ICD-10-CM

## 2021-12-21 DIAGNOSIS — O09.93 PREGNANCY, SUPERVISION, HIGH-RISK, THIRD TRIMESTER: Primary | ICD-10-CM

## 2021-12-21 DIAGNOSIS — E03.8 SUBCLINICAL HYPOTHYROIDISM: ICD-10-CM

## 2021-12-21 DIAGNOSIS — O13.3 GESTATIONAL HYPERTENSION WITHOUT SIGNIFICANT PROTEINURIA IN THIRD TRIMESTER: ICD-10-CM

## 2021-12-21 DIAGNOSIS — Z34.03 ENCOUNTER FOR SUPERVISION OF NORMAL FIRST PREGNANCY IN THIRD TRIMESTER: ICD-10-CM

## 2021-12-21 DIAGNOSIS — O13.3 GESTATIONAL HYPERTENSION WITHOUT SIGNIFICANT PROTEINURIA IN THIRD TRIMESTER: Primary | ICD-10-CM

## 2021-12-21 DIAGNOSIS — O99.213 OBESITY COMPLICATING PREGNANCY, THIRD TRIMESTER: ICD-10-CM

## 2021-12-21 PROBLEM — O09.01 PREGNANCY WITH HISTORY OF INFERTILITY IN FIRST TRIMESTER: Status: RESOLVED | Noted: 2021-07-25 | Resolved: 2021-12-21

## 2021-12-21 PROBLEM — Z34.90 PRENATAL CARE, ANTEPARTUM: Status: RESOLVED | Noted: 2021-08-22 | Resolved: 2021-12-21

## 2021-12-21 LAB
SL AMB  POCT GLUCOSE, UA: NORMAL
SL AMB POCT URINE PROTEIN: NORMAL

## 2021-12-21 PROCEDURE — 59025 FETAL NON-STRESS TEST: CPT | Performed by: OBSTETRICS & GYNECOLOGY

## 2021-12-21 PROCEDURE — 87150 DNA/RNA AMPLIFIED PROBE: CPT | Performed by: NURSE PRACTITIONER

## 2021-12-21 PROCEDURE — 76815 OB US LIMITED FETUS(S): CPT | Performed by: OBSTETRICS & GYNECOLOGY

## 2021-12-21 PROCEDURE — PNV: Performed by: NURSE PRACTITIONER

## 2021-12-23 ENCOUNTER — ROUTINE PRENATAL (OUTPATIENT)
Dept: PERINATAL CARE | Facility: OTHER | Age: 31
End: 2021-12-23
Payer: COMMERCIAL

## 2021-12-23 VITALS
HEART RATE: 91 BPM | SYSTOLIC BLOOD PRESSURE: 132 MMHG | DIASTOLIC BLOOD PRESSURE: 89 MMHG | BODY MASS INDEX: 41.01 KG/M2 | HEIGHT: 66 IN | WEIGHT: 255.2 LBS

## 2021-12-23 DIAGNOSIS — O13.3 GESTATIONAL HYPERTENSION WITHOUT SIGNIFICANT PROTEINURIA IN THIRD TRIMESTER: ICD-10-CM

## 2021-12-23 DIAGNOSIS — O28.8 NON-REACTIVE NST (NON-STRESS TEST): ICD-10-CM

## 2021-12-23 DIAGNOSIS — Z3A.35 35 WEEKS GESTATION OF PREGNANCY: Primary | ICD-10-CM

## 2021-12-23 DIAGNOSIS — O99.213 OBESITY COMPLICATING PREGNANCY, THIRD TRIMESTER: ICD-10-CM

## 2021-12-23 PROCEDURE — 76818 FETAL BIOPHYS PROFILE W/NST: CPT | Performed by: OBSTETRICS & GYNECOLOGY

## 2021-12-24 LAB — GP B STREP DNA SPEC QL NAA+PROBE: NEGATIVE

## 2021-12-27 ENCOUNTER — TELEPHONE (OUTPATIENT)
Dept: OBGYN CLINIC | Facility: CLINIC | Age: 31
End: 2021-12-27

## 2021-12-28 ENCOUNTER — ROUTINE PRENATAL (OUTPATIENT)
Dept: PERINATAL CARE | Facility: OTHER | Age: 31
End: 2021-12-28
Payer: COMMERCIAL

## 2021-12-28 VITALS
SYSTOLIC BLOOD PRESSURE: 127 MMHG | WEIGHT: 254.6 LBS | HEIGHT: 66 IN | DIASTOLIC BLOOD PRESSURE: 73 MMHG | BODY MASS INDEX: 40.92 KG/M2 | HEART RATE: 73 BPM

## 2021-12-28 DIAGNOSIS — O13.3 GESTATIONAL HYPERTENSION WITHOUT SIGNIFICANT PROTEINURIA IN THIRD TRIMESTER: Primary | ICD-10-CM

## 2021-12-28 PROCEDURE — 59025 FETAL NON-STRESS TEST: CPT | Performed by: OBSTETRICS & GYNECOLOGY

## 2021-12-29 ENCOUNTER — APPOINTMENT (OUTPATIENT)
Dept: LAB | Facility: HOSPITAL | Age: 31
End: 2021-12-29
Payer: COMMERCIAL

## 2021-12-29 ENCOUNTER — ROUTINE PRENATAL (OUTPATIENT)
Dept: OBGYN CLINIC | Facility: CLINIC | Age: 31
End: 2021-12-29

## 2021-12-29 VITALS
BODY MASS INDEX: 40.79 KG/M2 | HEIGHT: 66 IN | WEIGHT: 253.8 LBS | DIASTOLIC BLOOD PRESSURE: 86 MMHG | SYSTOLIC BLOOD PRESSURE: 124 MMHG

## 2021-12-29 DIAGNOSIS — O09.93 PREGNANCY, SUPERVISION, HIGH-RISK, THIRD TRIMESTER: Primary | ICD-10-CM

## 2021-12-29 DIAGNOSIS — O13.3 GESTATIONAL HYPERTENSION WITHOUT SIGNIFICANT PROTEINURIA IN THIRD TRIMESTER: ICD-10-CM

## 2021-12-29 DIAGNOSIS — O99.213 OBESITY COMPLICATING PREGNANCY, THIRD TRIMESTER: ICD-10-CM

## 2021-12-29 DIAGNOSIS — E03.8 SUBCLINICAL HYPOTHYROIDISM: ICD-10-CM

## 2021-12-29 DIAGNOSIS — Z3A.36 36 WEEKS GESTATION OF PREGNANCY: ICD-10-CM

## 2021-12-29 LAB
SL AMB  POCT GLUCOSE, UA: NEGATIVE
SL AMB POCT URINE PROTEIN: NEGATIVE

## 2021-12-29 PROCEDURE — PNV: Performed by: NURSE PRACTITIONER

## 2021-12-30 ENCOUNTER — ROUTINE PRENATAL (OUTPATIENT)
Dept: PERINATAL CARE | Facility: CLINIC | Age: 31
End: 2021-12-30
Payer: COMMERCIAL

## 2021-12-30 ENCOUNTER — ULTRASOUND (OUTPATIENT)
Dept: PERINATAL CARE | Facility: CLINIC | Age: 31
End: 2021-12-30
Payer: COMMERCIAL

## 2021-12-30 VITALS
BODY MASS INDEX: 41.21 KG/M2 | DIASTOLIC BLOOD PRESSURE: 91 MMHG | SYSTOLIC BLOOD PRESSURE: 144 MMHG | WEIGHT: 256.4 LBS | HEIGHT: 66 IN | HEART RATE: 95 BPM

## 2021-12-30 DIAGNOSIS — Z3A.36 36 WEEKS GESTATION OF PREGNANCY: Primary | ICD-10-CM

## 2021-12-30 DIAGNOSIS — O13.3 GESTATIONAL HYPERTENSION WITHOUT SIGNIFICANT PROTEINURIA IN THIRD TRIMESTER: ICD-10-CM

## 2021-12-30 PROCEDURE — 59025 FETAL NON-STRESS TEST: CPT | Performed by: OBSTETRICS & GYNECOLOGY

## 2021-12-30 PROCEDURE — NC001 PR NO CHARGE: Performed by: OBSTETRICS & GYNECOLOGY

## 2021-12-30 PROCEDURE — 99213 OFFICE O/P EST LOW 20 MIN: CPT | Performed by: OBSTETRICS & GYNECOLOGY

## 2021-12-30 PROCEDURE — 76816 OB US FOLLOW-UP PER FETUS: CPT | Performed by: OBSTETRICS & GYNECOLOGY

## 2021-12-30 NOTE — PROGRESS NOTES
Repeat Non-Stress Testing:    Pt verbalizes +FM  Pt denies ALL:               Leaking of fluid   Contractions   Vaginal bleeding   Decreased fetal movement    Patient has no questions or concerns  DR López Leader viewed NST prior to completion of appointment

## 2021-12-30 NOTE — PATIENT INSTRUCTIONS
Here is a handout on preeclampsia:    Preeclampsia   WHAT YOU NEED TO KNOW:   What is preeclampsia? Preeclampsia is a condition that can develop during week 20 or later of your pregnancy  Preeclampsia means you have high blood pressure and may have protein in your urine  Preeclampsia can cause mild to life-threatening health problems for you and your unborn baby  What are the signs and symptoms of preeclampsia? You may not have any symptoms  Severe preeclampsia may cause any of the following symptoms:  · Swollen face and hands    · A sudden weight gain of 5 pounds or more    · Headache    · Spotted or blurred vision     · Pain in your upper abdomen  What increases my risk for preeclampsia? · First pregnancy    · Pregnant with twins or multiples    · Personal or family history of preeclampsia or eclampsia    · Overweight    · Diabetes, high blood pressure, or kidney disease    · Age older than 40 years  How is preeclampsia diagnosed? · A blood pressure  of 140/90 mmHg or more for at least 2 readings may mean you have preeclampsia  Your blood pressure will need to be checked 1 to 2 times a week until your baby is born  · Blood tests  are done to check your liver and kidney function  You may need blood tests every week while you are pregnant  · Urine tests  are used to check for protein  You may need to give healthcare providers a urine sample at each visit  You may also need to collect your urine every time you urinate for 24 hours  How will my unborn baby be monitored? You may need to keep track of how often your baby moves or kicks over a certain amount of time  Ask your healthcare provider how to do kick counts and how often to do them  You may also need the following tests at each visit until your baby is born:  · A fetal biophysical profile  combines a nonstress test and an ultrasound of your unborn baby  The nonstress test measures changes in your baby's heartbeat when he moves   The ultrasound will show your baby's movement, growth, and how his breathing muscles are working  Healthcare providers can check the amount of fluid around your baby  The ultrasound will also show how well your baby's lungs are working  · An umbilical cord Doppler  checks blood flow through the umbilical cord  How is preeclampsia treated? · Medicines  may be given to lower your blood pressure, protect your organs, or prevent seizures  Low doses of aspirin after 12 weeks of pregnancy may be recommended if you are at high risk for preeclampsia  Aspirin may help prevent preeclampsia or problems that can happen from preeclampsia  Do not take aspirin unless directed by your healthcare provider  · Rest  as directed  Your healthcare provider may tell you to rest more often if you have mild symptoms of preeclampsia  Lie on your left side as often as you can  You may need complete bedrest if you have more severe symptoms  You may need to be in the hospital if your condition worsens  · Delivery  usually stops preeclampsia  Healthcare providers may deliver your baby right away if he is full-term (37 weeks or more)  He may need to be delivered early if you or the baby has life-threatening symptoms  What are the risks of preeclampsia? Your baby may not grow as he should and may need to be delivered early  Placental abruption can occur if the placenta pulls away from the uterus too soon  This condition is life-threatening for your baby  High blood pressure that is not controlled can lead to blood clots, kidney or liver failure, or stroke  Severe preeclampsia can cause seizures or coma  This condition is called eclampsia  Eclampsia is a life-threatening condition for you and your unborn baby  Call 911 for any of the following:   · You have a seizure  · You have severe abdominal pain with nausea and vomiting  When should I seek immediate care? · You develop a severe headache that does not go away      · You have blurred or spotted vision that does not go away  · You are bleeding from your vagina  · You have new or increased swelling in your face or hands  · You are urinating little or not at all  When should I contact my healthcare provider? · You are urinating less than usual      · You do not feel your baby's movements as often as usual     · You have questions or concerns about your condition or care  CARE AGREEMENT:   You have the right to help plan your care  Learn about your health condition and how it may be treated  Discuss treatment options with your caregivers to decide what care you want to receive  You always have the right to refuse treatment  The above information is an  only  It is not intended as medical advice for individual conditions or treatments  Talk to your doctor, nurse or pharmacist before following any medical regimen to see if it is safe and effective for you  © 2017 [address] [employer] Information is for End User's use only and may not be sold, redistributed or otherwise used for commercial purposes  All illustrations and images included in CareNotes® are the copyrighted property of A D A M , Inc  or [employer]ohaib Harrison

## 2021-12-30 NOTE — PATIENT INSTRUCTIONS
Kick Counts in Pregnancy   AMBULATORY CARE:   Kick counts  measure how much your baby is moving in your womb  A kick from your baby can be felt as a twist, turn, swish, roll, or jab  It is common to feel your baby kicking at 26 to 28 weeks of pregnancy  You may feel your baby kick as early as 20 weeks of pregnancy  You may want to start counting at 28 weeks  Contact your doctor immediately if:   · You feel a change in the number of kicks or movements of your baby  · You feel fewer than 10 kicks within 2 hours  · You have questions or concerns about your baby's movements  Why measure kick counts:  Your baby's movement may provide information about your baby's health  He or she may move less, or not at all, if there are problems  Your baby may move less if he or she is not getting enough oxygen or nutrition from the placenta  Do not smoke while you are pregnant  Smoking decreases the amount of oxygen that gets to your baby  Talk to your healthcare provider if you need help to quit smoking  Tell your healthcare provider as soon as you feel a change in your baby's movements  When to measure kick counts:   · Measure kick counts at the same time every day  · Measure kick counts when your baby is awake and most active  Your baby may be most active in the evening  How to measure kick counts:  Check that your baby is awake before you measure kick counts  You can wake up your baby by lightly pushing on your belly, walking, or drinking something cold  Your healthcare provider may tell you different ways to measure kick counts  You may be told to do the following:  · Use a chart or clock to keep track of the time you start and finish counting  · Sit in a chair or lie on your left side  · Place your hands on the largest part of your belly  · Count until you reach 10 kicks  Write down how much time it takes to count 10 kicks  · It may take 30 minutes to 2 hours to count 10 kicks   It should not take more than 2 hours to count 10 kicks  Follow up with your doctor as directed:  Write down your questions so you remember to ask them during your visits  © Copyright [employer] 2021 Information is for End User's use only and may not be sold, redistributed or otherwise used for commercial purposes  All illustrations and images included in CareNotes® are the copyrighted property of A D A M , Inc  or [facility] Yaneli Cazares   The above information is an  only  It is not intended as medical advice for individual conditions or treatments  Talk to your doctor, nurse or pharmacist before following any medical regimen to see if it is safe and effective for you

## 2022-01-01 NOTE — PROGRESS NOTES
[id number] [facility]: Ms Bebe Barbosa was seen today for NST (found under the pregnancy episode) which I reviewed the RN assessment and agree, and fetal growth ultrasound (see ultrasound report under OB procedures tab)  The time spent on this established patient on the encounter date included 5 minutes previsit service time reviewing records and precharting, 5 minutes face-to-face service time counseling regarding results and coordinating care, and  3 minutes charting, totalling 13 minutes  Please don't hesitate to contact our office with any concerns or questions    Lindsay Parkinson MD

## 2022-01-04 ENCOUNTER — HOSPITAL ENCOUNTER (OUTPATIENT)
Dept: LABOR AND DELIVERY | Facility: HOSPITAL | Age: 32
Discharge: HOME | End: 2022-01-04
Payer: COMMERCIAL

## 2022-01-04 ENCOUNTER — ANESTHESIA (INPATIENT)
Dept: ANESTHESIOLOGY | Facility: HOSPITAL | Age: 32
End: 2022-01-04
Payer: COMMERCIAL

## 2022-01-04 ENCOUNTER — HOSPITAL ENCOUNTER (INPATIENT)
Facility: HOSPITAL | Age: 32
LOS: 3 days | Discharge: HOME | End: 2022-01-07
Attending: OBSTETRICS & GYNECOLOGY | Admitting: OBSTETRICS & GYNECOLOGY
Payer: COMMERCIAL

## 2022-01-04 ENCOUNTER — ANESTHESIA EVENT (INPATIENT)
Dept: ANESTHESIOLOGY | Facility: HOSPITAL | Age: 32
End: 2022-01-04
Payer: COMMERCIAL

## 2022-01-04 ENCOUNTER — ROUTINE PRENATAL (OUTPATIENT)
Dept: PERINATAL CARE | Facility: OTHER | Age: 32
End: 2022-01-04
Payer: COMMERCIAL

## 2022-01-04 VITALS
SYSTOLIC BLOOD PRESSURE: 145 MMHG | WEIGHT: 258.2 LBS | DIASTOLIC BLOOD PRESSURE: 82 MMHG | HEIGHT: 66 IN | HEART RATE: 90 BPM | BODY MASS INDEX: 41.5 KG/M2

## 2022-01-04 DIAGNOSIS — O13.3 GESTATIONAL HYPERTENSION WITHOUT SIGNIFICANT PROTEINURIA IN THIRD TRIMESTER: ICD-10-CM

## 2022-01-04 DIAGNOSIS — Z3A.37 37 WEEKS GESTATION OF PREGNANCY: ICD-10-CM

## 2022-01-04 DIAGNOSIS — Z3A.37 37 WEEKS GESTATION OF PREGNANCY: Primary | ICD-10-CM

## 2022-01-04 LAB
ALBUMIN SERPL BCP-MCNC: 2.5 G/DL (ref 3.5–5)
ALP SERPL-CCNC: 136 U/L (ref 46–116)
ALT SERPL W P-5'-P-CCNC: 14 U/L (ref 12–78)
ANION GAP SERPL CALCULATED.3IONS-SCNC: 14 MMOL/L (ref 4–13)
AST SERPL W P-5'-P-CCNC: 14 U/L (ref 5–45)
BILIRUB SERPL-MCNC: 0.11 MG/DL (ref 0.2–1)
BUN SERPL-MCNC: 11 MG/DL (ref 5–25)
CALCIUM ALBUM COR SERPL-MCNC: 10.9 MG/DL (ref 8.3–10.1)
CALCIUM SERPL-MCNC: 9.7 MG/DL (ref 8.3–10.1)
CHLORIDE SERPL-SCNC: 104 MMOL/L (ref 100–108)
CO2 SERPL-SCNC: 20 MMOL/L (ref 21–32)
CREAT SERPL-MCNC: 0.74 MG/DL (ref 0.6–1.3)
CREAT UR-MCNC: 45 MG/DL
ERYTHROCYTE [DISTWIDTH] IN BLOOD BY AUTOMATED COUNT: 14 % (ref 11.6–15.1)
GFR SERPL CREATININE-BSD FRML MDRD: 107 ML/MIN/1.73SQ M
GLUCOSE SERPL-MCNC: 91 MG/DL (ref 65–140)
HCT VFR BLD AUTO: 36.1 % (ref 34.8–46.1)
HGB BLD-MCNC: 12.2 G/DL (ref 11.5–15.4)
MCH RBC QN AUTO: 30.3 PG (ref 26.8–34.3)
MCHC RBC AUTO-ENTMCNC: 33.8 G/DL (ref 31.4–37.4)
MCV RBC AUTO: 90 FL (ref 82–98)
PLATELET # BLD AUTO: 232 THOUSANDS/UL (ref 149–390)
PMV BLD AUTO: 11.1 FL (ref 8.9–12.7)
POTASSIUM SERPL-SCNC: 4 MMOL/L (ref 3.5–5.3)
PROT SERPL-MCNC: 6.7 G/DL (ref 6.4–8.2)
PROT UR-MCNC: 13 MG/DL
PROT/CREAT UR: 0.29 MG/G{CREAT} (ref 0–0.1)
RBC # BLD AUTO: 4.02 MILLION/UL (ref 3.81–5.12)
SODIUM SERPL-SCNC: 138 MMOL/L (ref 136–145)
WBC # BLD AUTO: 12.03 THOUSAND/UL (ref 4.31–10.16)

## 2022-01-04 PROCEDURE — 85027 COMPLETE CBC AUTOMATED: CPT

## 2022-01-04 PROCEDURE — 80053 COMPREHEN METABOLIC PANEL: CPT

## 2022-01-04 PROCEDURE — 3E0P7VZ INTRODUCTION OF HORMONE INTO FEMALE REPRODUCTIVE, VIA NATURAL OR ARTIFICIAL OPENING: ICD-10-PCS | Performed by: OBSTETRICS & GYNECOLOGY

## 2022-01-04 PROCEDURE — 86900 BLOOD TYPING SEROLOGIC ABO: CPT

## 2022-01-04 PROCEDURE — 3E033VJ INTRODUCTION OF OTHER HORMONE INTO PERIPHERAL VEIN, PERCUTANEOUS APPROACH: ICD-10-PCS | Performed by: OBSTETRICS & GYNECOLOGY

## 2022-01-04 PROCEDURE — 82570 ASSAY OF URINE CREATININE: CPT

## 2022-01-04 PROCEDURE — 59025 FETAL NON-STRESS TEST: CPT | Performed by: OBSTETRICS & GYNECOLOGY

## 2022-01-04 PROCEDURE — 4A1HXCZ MONITORING OF PRODUCTS OF CONCEPTION, CARDIAC RATE, EXTERNAL APPROACH: ICD-10-PCS | Performed by: OBSTETRICS & GYNECOLOGY

## 2022-01-04 PROCEDURE — 10907ZC DRAINAGE OF AMNIOTIC FLUID, THERAPEUTIC FROM PRODUCTS OF CONCEPTION, VIA NATURAL OR ARTIFICIAL OPENING: ICD-10-PCS | Performed by: OBSTETRICS & GYNECOLOGY

## 2022-01-04 PROCEDURE — 86592 SYPHILIS TEST NON-TREP QUAL: CPT

## 2022-01-04 PROCEDURE — 86850 RBC ANTIBODY SCREEN: CPT

## 2022-01-04 PROCEDURE — NC001 PR NO CHARGE: Performed by: OBSTETRICS & GYNECOLOGY

## 2022-01-04 PROCEDURE — 86901 BLOOD TYPING SEROLOGIC RH(D): CPT

## 2022-01-04 PROCEDURE — 84156 ASSAY OF PROTEIN URINE: CPT

## 2022-01-04 RX ORDER — LEVOTHYROXINE SODIUM 0.05 MG/1
50 TABLET ORAL
Status: DISCONTINUED | OUTPATIENT
Start: 2022-01-05 | End: 2022-01-07 | Stop reason: HOSPADM

## 2022-01-04 RX ORDER — SODIUM CHLORIDE, SODIUM LACTATE, POTASSIUM CHLORIDE, CALCIUM CHLORIDE 600; 310; 30; 20 MG/100ML; MG/100ML; MG/100ML; MG/100ML
125 INJECTION, SOLUTION INTRAVENOUS CONTINUOUS
Status: DISCONTINUED | OUTPATIENT
Start: 2022-01-04 | End: 2022-01-05

## 2022-01-04 RX ADMIN — Medication 25 MCG: at 23:24

## 2022-01-04 RX ADMIN — SODIUM CHLORIDE, SODIUM LACTATE, POTASSIUM CHLORIDE, AND CALCIUM CHLORIDE 125 ML/HR: .6; .31; .03; .02 INJECTION, SOLUTION INTRAVENOUS at 21:55

## 2022-01-04 NOTE — PROGRESS NOTES
Repeat Non-Stress Testing:    Pt verbalizes +FM  Pt denies ALL:               Leaking of fluid   Contractions   Vaginal bleeding   Decreased fetal movement    Patient has no questions or concerns  NST reviewed by Dr Janice Martinez

## 2022-01-05 LAB
ABO GROUP BLD: NORMAL
BASE EXCESS BLDCOA CALC-SCNC: -6.5 MMOL/L (ref 3–11)
BASE EXCESS BLDCOV CALC-SCNC: -5.4 MMOL/L (ref 1–9)
BLD GP AB SCN SERPL QL: NEGATIVE
HCO3 BLDCOA-SCNC: 22.6 MMOL/L (ref 17.3–27.3)
HCO3 BLDCOV-SCNC: 19.3 MMOL/L (ref 12.2–28.6)
O2 CT VFR BLDCOA CALC: 10 ML/DL
OXYHGB MFR BLDCOA: 41.3 %
OXYHGB MFR BLDCOV: 60.9 %
PCO2 BLDCOA: 58.4 MM[HG] (ref 30–60)
PCO2 BLDCOV: 36 MM HG (ref 27–43)
PH BLDCOA: 7.21 [PH] (ref 7.23–7.43)
PH BLDCOV: 7.35 [PH] (ref 7.19–7.49)
PO2 BLDCOA: 21.5 MM HG (ref 5–25)
PO2 BLDCOV: 26.3 MM HG (ref 15–45)
RH BLD: POSITIVE
RPR SER QL: NORMAL
SAO2 % BLDCOV: 14.4 ML/DL
SPECIMEN EXPIRATION DATE: NORMAL

## 2022-01-05 PROCEDURE — 88307 TISSUE EXAM BY PATHOLOGIST: CPT | Performed by: PATHOLOGY

## 2022-01-05 PROCEDURE — 82805 BLOOD GASES W/O2 SATURATION: CPT | Performed by: OBSTETRICS & GYNECOLOGY

## 2022-01-05 PROCEDURE — 0KQM0ZZ REPAIR PERINEUM MUSCLE, OPEN APPROACH: ICD-10-PCS | Performed by: OBSTETRICS & GYNECOLOGY

## 2022-01-05 PROCEDURE — 59400 OBSTETRICAL CARE: CPT | Performed by: OBSTETRICS & GYNECOLOGY

## 2022-01-05 PROCEDURE — 99024 POSTOP FOLLOW-UP VISIT: CPT | Performed by: STUDENT IN AN ORGANIZED HEALTH CARE EDUCATION/TRAINING PROGRAM

## 2022-01-05 RX ORDER — ONDANSETRON 2 MG/ML
4 INJECTION INTRAMUSCULAR; INTRAVENOUS EVERY 8 HOURS PRN
Status: DISCONTINUED | OUTPATIENT
Start: 2022-01-05 | End: 2022-01-07 | Stop reason: HOSPADM

## 2022-01-05 RX ORDER — DIPHENHYDRAMINE HCL 25 MG
25 TABLET ORAL EVERY 6 HOURS PRN
Status: DISCONTINUED | OUTPATIENT
Start: 2022-01-05 | End: 2022-01-07 | Stop reason: HOSPADM

## 2022-01-05 RX ORDER — OXYTOCIN/RINGER'S LACTATE 30/500 ML
1-30 PLASTIC BAG, INJECTION (ML) INTRAVENOUS
Status: DISCONTINUED | OUTPATIENT
Start: 2022-01-05 | End: 2022-01-05

## 2022-01-05 RX ORDER — BUTORPHANOL TARTRATE 1 MG/ML
1 INJECTION, SOLUTION INTRAMUSCULAR; INTRAVENOUS ONCE
Status: COMPLETED | OUTPATIENT
Start: 2022-01-05 | End: 2022-01-05

## 2022-01-05 RX ORDER — CALCIUM CARBONATE 200(500)MG
1000 TABLET,CHEWABLE ORAL DAILY PRN
Status: DISCONTINUED | OUTPATIENT
Start: 2022-01-05 | End: 2022-01-07 | Stop reason: HOSPADM

## 2022-01-05 RX ORDER — LIDOCAINE HYDROCHLORIDE AND EPINEPHRINE 15; 5 MG/ML; UG/ML
INJECTION, SOLUTION EPIDURAL
Status: COMPLETED | OUTPATIENT
Start: 2022-01-05 | End: 2022-01-05

## 2022-01-05 RX ORDER — DOCUSATE SODIUM 100 MG/1
100 CAPSULE, LIQUID FILLED ORAL 2 TIMES DAILY
Status: DISCONTINUED | OUTPATIENT
Start: 2022-01-05 | End: 2022-01-07 | Stop reason: HOSPADM

## 2022-01-05 RX ORDER — IBUPROFEN 600 MG/1
600 TABLET ORAL EVERY 6 HOURS PRN
Status: DISCONTINUED | OUTPATIENT
Start: 2022-01-05 | End: 2022-01-07 | Stop reason: HOSPADM

## 2022-01-05 RX ORDER — ACETAMINOPHEN 325 MG/1
650 TABLET ORAL EVERY 4 HOURS PRN
Status: DISCONTINUED | OUTPATIENT
Start: 2022-01-05 | End: 2022-01-07 | Stop reason: HOSPADM

## 2022-01-05 RX ORDER — DIAPER,BRIEF,INFANT-TODD,DISP
1 EACH MISCELLANEOUS 4 TIMES DAILY PRN
Status: DISCONTINUED | OUTPATIENT
Start: 2022-01-05 | End: 2022-01-07 | Stop reason: HOSPADM

## 2022-01-05 RX ADMIN — BUTORPHANOL TARTRATE 1 MG: 1 INJECTION, SOLUTION INTRAMUSCULAR; INTRAVENOUS at 06:47

## 2022-01-05 RX ADMIN — SODIUM CHLORIDE, SODIUM LACTATE, POTASSIUM CHLORIDE, AND CALCIUM CHLORIDE 125 ML/HR: .6; .31; .03; .02 INJECTION, SOLUTION INTRAVENOUS at 17:43

## 2022-01-05 RX ADMIN — ROPIVACAINE HYDROCHLORIDE: 2 INJECTION, SOLUTION EPIDURAL; INFILTRATION at 16:25

## 2022-01-05 RX ADMIN — SODIUM CHLORIDE, SODIUM LACTATE, POTASSIUM CHLORIDE, AND CALCIUM CHLORIDE 999 ML/HR: .6; .31; .03; .02 INJECTION, SOLUTION INTRAVENOUS at 15:59

## 2022-01-05 RX ADMIN — BENZOCAINE AND LEVOMENTHOL: 200; 5 SPRAY TOPICAL at 22:44

## 2022-01-05 RX ADMIN — IBUPROFEN 600 MG: 600 TABLET ORAL at 22:44

## 2022-01-05 RX ADMIN — LEVOTHYROXINE SODIUM 50 MCG: 50 TABLET ORAL at 04:51

## 2022-01-05 RX ADMIN — SODIUM CHLORIDE, SODIUM LACTATE, POTASSIUM CHLORIDE, AND CALCIUM CHLORIDE 125 ML/HR: .6; .31; .03; .02 INJECTION, SOLUTION INTRAVENOUS at 12:02

## 2022-01-05 RX ADMIN — Medication 2 MILLI-UNITS/MIN: at 12:19

## 2022-01-05 RX ADMIN — BUTORPHANOL TARTRATE 1 MG: 1 INJECTION, SOLUTION INTRAMUSCULAR; INTRAVENOUS at 13:57

## 2022-01-05 RX ADMIN — LIDOCAINE HYDROCHLORIDE AND EPINEPHRINE 3 ML: 15; 5 INJECTION, SOLUTION EPIDURAL at 16:16

## 2022-01-05 NOTE — OB LABOR/OXYTOCIN SAFETY PROGRESS
Labor Progress Note - Mahesh Arrieta 27 y o  female MRN: [id number]    Unit/Bed#: -01 Encounter: [id number]       Contraction Frequency (minutes):  (unable to determine contractions )  Contraction Quality: Mild  Tachysystole: No   Cervical Dilation: 1        Cervical Effacement: 70  Fetal Station: -2  Baseline Rate: 140 bpm  Fetal Heart Rate: 152 BPM  FHR Category: Category I               Vital Signs:   Vitals:    01/05/22 0059   BP: 142/83   Pulse: 98   Resp: 16   Temp: 98 3 °F (36 8 °C)   SpO2:            Notes/comments:   SVE unchanged, and cervix still firm in consistency  Fetal head well applied to cervix, and with cervix being firm, there is little room for inflation of a murray balloon  Due to concern that a murray balloon would rupture her membranes earlier than intended, decision was made to proceed with second dose of vaginal Cytotec  FHT category 1 with no contractions  Dr Stella Russell aware         Rivas Scales MD 1/5/2022 4:34 AM

## 2022-01-05 NOTE — PROGRESS NOTES
Balaji Mireles 258 27 y o  female MRN: [id number]  Unit/Bed#Muriel Vyas Encounter: [id number]      Assessment: 27 y o   at 37w0d admitted for {scmobprob:91594}  SVE: ***  FHT: ***  Clinical EFW: ***  Vertex confirmed by ***  GBS status: ***   Postpartum contraception plan: ***      Plan:   · Admit  · CBC, RPR, Type & Screen  · Analgesia at maternal request  · Expectant management ***  · Antibiotics ***    Dr Agustín Ro aware        SUBJECTIVE:    Chief Complaint: Induction of Labor with Cervical Ripening     HPI: Logan Allen is a 27 y o  [place] with an MARIANN of 2022, by Last Menstrual Period at 37w0d who is being admitted for scheduled induction of labor  She {gen contractions:710644}, has {scmLOF:05152}, and reports {scmobvb:30593}  She {scmfm:50976}  Pregnancy complications: Gestation Hypertension without significant proteinuria in third trimester, Obesity, Subclinical Hypothyroidism, PCOS,     Patient Active Problem List   Diagnosis    PCOS (polycystic ovarian syndrome)    Pregnancy, supervision, high-risk, third trimester    Subclinical hypothyroidism    Obesity complicating pregnancy, third trimester    Gestational hypertension without significant proteinuria in third trimester    37 weeks gestation of pregnancy    Encounter for supervision of normal first pregnancy in third trimester       Baby complications/comments: none***    Review of Systems    OB History    Para Term  AB Living   2 0 0 0 1 0   SAB IAB Ectopic Multiple Live Births   1 0 0 0 0      # Outcome Date GA Lbr Thomas/2nd Weight Sex Delivery Anes PTL Lv   2 Current            1 2021 5w0d              Past Medical History:   Diagnosis Date    Gestational hypertension     Hypothyroidism     dx 2021    PCOS (polycystic ovarian syndrome)        No past surgical history on file      Social History     Tobacco Use    Smoking status: Former Smoker     Packs/day: 0 50     Years: 7 00     Pack years: 3 50     Quit date: 2019     Years since quitting: 3 0    Smokeless tobacco: Never Used   Substance Use Topics    Alcohol use: Not Currently       Allergies   Allergen Reactions    Co Q10 [Ubidecarenone] Hives       Medications Prior to Admission   Medication    Cholecalciferol (Vitamin D3) 50 MCG (2000 UT) TABS    levothyroxine 50 mcg tablet    Prenatal Vit-Fe Fumarate-FA (PRENATAL VITAMIN AND MINERAL PO)           OBJECTIVE:  Vitals:  HR:  [90] 90  BP: (145)/(82) 145/82  There is no height or weight on file to calculate BMI       Physical Exam:  OBGyn Exam     SVE:       FHT:       TOCO:        Lab Results   Component Value Date    WBC 11 65 (H) 12/29/2021    HGB 12 8 12/29/2021    HCT 39 1 12/29/2021     12/29/2021     Lab Results   Component Value Date    K 4 1 12/29/2021     12/29/2021    CO2 21 12/29/2021    BUN 10 12/29/2021    CREATININE 0 72 12/29/2021    AST 15 12/29/2021    ALT 20 12/29/2021       Prenatal Labs: Reviewed ***     Blood type: {JENNY BLOOD TYPE:72677}  Antibody: {positive/negative/pending/unknown:45073}  Group B strep: {positive/negative/pending/unknown:80879}  HIV: {positive/negative/pending/unknown:37391}  Hepatitis B: {positive/negative/pending/unknown:74786}  RPR: non-reactive  Rubella: {Desc; immune/not/unknown:39233}  Varicella: {Desc; immune/not/unknown:61568}  1 hour Glucose: ***  3 hour glucose: ***  Platelets: ***  Hgb: 13 3    {Expected LOS:31312}  {Admission Status:32683}      Liat Swanson DO  OB/GYN PGY-1  1/4/2022  8:04 PM

## 2022-01-05 NOTE — ANESTHESIA PREPROCEDURE EVALUATION
Procedure:  LABOR ANALGESIA    Relevant Problems   CARDIO   (+) Gestational hypertension without significant proteinuria in third trimester      ENDO   (+) Subclinical hypothyroidism      GYN   (+) 37 weeks gestation of pregnancy   (+) Encounter for supervision of normal first pregnancy in third trimester   (+) Pregnancy, supervision, high-risk, third trimester      plt 221  Physical Exam    Airway       Dental   No notable dental hx     Cardiovascular  Cardiovascular exam normal    Pulmonary  Pulmonary exam normal     Other Findings        Anesthesia Plan  ASA Score- 3     Anesthesia Type- epidural with ASA Monitors  Additional Monitors:   Airway Plan:           Plan Factors-    Chart reviewed  Existing labs reviewed  Patient summary reviewed  Induction-     Postoperative Plan-     Informed Consent- Anesthetic plan and risks discussed with patient  I personally reviewed this patient with the CRNA  Discussed and agreed on the Anesthesia Plan with the CRNA  Sarkis Schultz

## 2022-01-05 NOTE — OB LABOR/OXYTOCIN SAFETY PROGRESS
Labor Progress Note - Rosalinda Pederson 27 y o  female MRN: [id number]    Unit/Bed#: -01 Encounter: [id number]       Contraction Frequency (minutes): n/a  Contraction Quality: Not applicable  Tachysystole: No   Cervical Dilation: 1        Cervical Effacement: 70  Fetal Station: -2  Baseline Rate: 150 bpm  Fetal Heart Rate: 152 BPM  FHR Category: Category I               Vital Signs:   Vitals:    01/04/22 2254   BP: 139/85   Pulse: 97   Resp:    Temp:    SpO2:            Notes/comments:      First dose of vaginal Cytotec placed at 2325  FHT category I, will recheck in 3-4 hours       Phuong Capps MD 1/4/2022 11:26 PM

## 2022-01-05 NOTE — OB LABOR/OXYTOCIN SAFETY PROGRESS
Oxytocin Safety Progress Check Note - Edita Chirinos 27 y o  female MRN: [id number]    Unit/Bed#: -01 Encounter: [id number]    Dose (mateusz-units/min) Oxytocin: 6 mateusz-units/min  Contraction Frequency (minutes): 2 mins   Contraction Quality: Moderate  Tachysystole: No   Cervical Dilation: 5-6        Cervical Effacement: 90  Fetal Station: 0  Baseline Rate: 155 bpm  Fetal Heart Rate: 147 BPM  FHR Category: Category I               Vital Signs:   Vitals:    01/05/22 1642   BP: 150/78   Pulse: 100   Resp:    Temp:    SpO2:            Notes/comments:   Patient now comfortable with epidural  SVE at this time 5-6/90/0  Fetal head well applied  AROM at this time for clear fluid       Bg Overall, MD 1/5/2022 4:58 PM

## 2022-01-05 NOTE — OB LABOR/OXYTOCIN SAFETY PROGRESS
Labor Progress Note - Jose Juan Castillo 27 y o  female MRN: [id number]    Unit/Bed#: -01 Encounter: [id number]       Contraction Frequency (minutes): unable to determine  Contraction Quality: Mild  Tachysystole: No   Cervical Dilation: 1        Cervical Effacement: 70  Fetal Station: -2  Baseline Rate: 140 bpm  Fetal Heart Rate: 150 BPM  FHR Category: Category I               Vital Signs:   Vitals:    01/05/22 0510   BP: 160/98   Pulse: 94   Resp: 18   Temp: 98 °F (36 7 °C)   SpO2:            Notes/comments:   Patient starting shannan every 1-2 min, and so cytotec was deferred and hayes balloon was placed as outlined below  Dr Betzy lynch  PROCEDURE:  HAYES BALLOON PLACEMENT    A 24F hayes with a 30cc balloon was selected, a speculum examination was performed and the cervix was located  A hayes balloon was introduced over sterile gloved hands  Balloon advanced through cervix with a ringed forceps beyond the internal cervical os  A small amount amount of sterile saline solution was instilled in the balloon to confirm placement  Placement was confirmed to be beyond the internal cervical os  A total of 60cc of sterile saline solution was placed into the balloon  Pt tolerated well  Instructions left with RN to place hayes to gravity with a 1L bag of IV fluid  Notify DO/MD when hayes dislodged      MD Courtney Colunga MD 1/5/2022 5:15 AM

## 2022-01-05 NOTE — OB LABOR/OXYTOCIN SAFETY PROGRESS
Labor Progress Note - Martha Hampton 27 y o  female MRN: [id number]    Unit/Bed#: -01 Encounter: [id number]       Contraction Frequency (minutes): 1 5-5  Contraction Quality: Mild  Tachysystole: No   Cervical Dilation: 4        Cervical Effacement: 80  Fetal Station: -2  Baseline Rate: 150 bpm  Fetal Heart Rate: 158 BPM  FHR Category: Category I               Vital Signs:   Vitals:    01/05/22 1200   BP: 147/84   Pulse: 99   Resp: 18   Temp: 97 5 °F (36 4 °C)   SpO2:            Notes/comments:   Cervix 4/80/-2  Doing well  Overall comfortable  Will start pitocin         Juliann Díaz MD 1/5/2022 12:09 PM

## 2022-01-05 NOTE — ANESTHESIA PROCEDURE NOTES
Epidural Block    Patient location during procedure: OB  Start time: 1/5/2022 4:13 PM  Reason for block: procedure for pain and at surgeon's request  Staffing  Performed: Anesthesiologist   Anesthesiologist: Mayur Aparicio MD  Preanesthetic Checklist  Completed: patient identified, IV checked, site marked, risks and benefits discussed, surgical consent, monitors and equipment checked, pre-op evaluation and timeout performed  Epidural  Patient position: sitting  Prep: ChloraPrep  Patient monitoring: cardiac monitor, frequent blood pressure checks and continuous pulse ox  Approach: midline  Location: lumbar  Injection technique: RIRI saline  Needle  Needle type: Tuohy   Needle gauge: 17 G  Catheter type: side hole  Catheter size: 19 G  Catheter at skin depth: 12 cm  Catheter securement method: stabilization device  Test dose: negativelidocaine 1 5% with epinephrine 1:200,000 test dose, 3 mL  Assessment  Number of attempts: 1negative aspiration for CSF, negative aspiration for heme and no paresthesia on injection  patient tolerated the procedure well with no immediate complications  Additional Notes  Single skin pass  Unremarkable procedure

## 2022-01-05 NOTE — OB LABOR/OXYTOCIN SAFETY PROGRESS
Oxytocin Safety Progress Check Note - Rosalinda Pederson 27 y o  female MRN: [id number]    Unit/Bed#: -01 Encounter: [id number]    Dose (mateusz-units/min) Oxytocin: 6 mateusz-units/min  Contraction Frequency (minutes): 1 5-3  Contraction Quality: Moderate  Tachysystole: No   Cervical Dilation: 5-6        Cervical Effacement: 90  Fetal Station: 0  Baseline Rate: 155 bpm  Fetal Heart Rate: 157 BPM  FHR Category: Category I               Vital Signs:   Vitals:    01/05/22 1742   BP: 155/85   Pulse: (!) 107   Resp:    Temp:    SpO2:            Notes/comments:   Minimal variability noted on FHT with no accelerations in the past 30 minutes  Scalp stimulation performed, and acceleration noted followed by moderate variability  FHT now category 1  SVE unchanged  Contractions every 2-5 min  Pit running at 6, continue to titrate to contractions  Dr Taras Broussard aware      Silas Jacinto MD 1/5/2022 5:58 PM

## 2022-01-05 NOTE — PLAN OF CARE
Problem: BIRTH - VAGINAL/ SECTION  Goal: Fetal and maternal status remain reassuring during the birth process  Description: INTERVENTIONS:  - Monitor vital signs  - Monitor fetal heart rate  - Monitor uterine activity  - Monitor labor progression (vaginal delivery)  - DVT prophylaxis  - Antibiotic prophylaxis  Outcome: Progressing  Goal: Emotionally satisfying birthing experience for mother/fetus  Description: Interventions:  - Assess, plan, implement and evaluate the nursing care given to the patient in labor  - Advocate the philosophy that each childbirth experience is a unique experience and support the family's chosen level of involvement and control during the labor process   - Actively participate in both the patient's and family's teaching of the birth process  - Consider cultural, Synagogue and age-specific factors and plan care for the patient in labor  Outcome: Progressing     Problem: PAIN - ADULT  Goal: Verbalizes/displays adequate comfort level or baseline comfort level  Description: Interventions:  - Encourage patient to monitor pain and request assistance  - Assess pain using appropriate pain scale  - Administer analgesics based on type and severity of pain and evaluate response  - Implement non-pharmacological measures as appropriate and evaluate response  - Consider cultural and social influences on pain and pain management  - Notify physician/advanced practitioner if interventions unsuccessful or patient reports new pain  Outcome: Progressing     Problem: INFECTION - ADULT  Goal: Absence or prevention of progression during hospitalization  Description: INTERVENTIONS:  - Assess and monitor for signs and symptoms of infection  - Monitor lab/diagnostic results  - Monitor all insertion sites, i e  indwelling lines, tubes, and drains  - Monitor endotracheal if appropriate and nasal secretions for changes in amount and color  - [place] appropriate cooling/warming therapies per order  - Administer medications as ordered  - Instruct and encourage patient and family to use good hand hygiene technique  - Identify and instruct in appropriate isolation precautions for identified infection/condition  Outcome: Progressing  Goal: Absence of fever/infection during neutropenic period  Description: INTERVENTIONS:  - Monitor WBC    Outcome: Progressing     Problem: SAFETY ADULT  Goal: Patient will remain free of falls  Description: INTERVENTIONS:  - Educate patient/family on patient safety including physical limitations  - Instruct patient to call for assistance with activity   - Consult OT/PT to assist with strengthening/mobility   - Keep Call bell within reach  - Keep bed low and locked with side rails adjusted as appropriate  - Keep care items and personal belongings within reach  - Initiate and maintain comfort rounds  - Make Fall Risk Sign visible to staff  - Offer Toileting every  Hours, in advance of need  - Initiate/Maintain alarm  - Obtain necessary fall risk management equipment:   - Apply yellow socks and bracelet for high fall risk patients  - Consider moving patient to room near nurses station  Outcome: Progressing  Goal: Maintain or return to baseline ADL function  Description: INTERVENTIONS:  -  Assess patient's ability to carry out ADLs; assess patient's baseline for ADL function and identify physical deficits which impact ability to perform ADLs (bathing, care of mouth/teeth, toileting, grooming, dressing, etc )  - Assess/evaluate cause of self-care deficits   - Assess range of motion  - Assess patient's mobility; develop plan if impaired  - Assess patient's need for assistive devices and provide as appropriate  - Encourage maximum independence but intervene and supervise when necessary  - Involve family in performance of ADLs  - Assess for home care needs following discharge   - Consider OT consult to assist with ADL evaluation and planning for discharge  - Provide patient education as appropriate  Outcome: Progressing  Goal: Maintains/Returns to pre admission functional level  Description: INTERVENTIONS:  - Perform BMAT or MOVE assessment daily    - Set and communicate daily mobility goal to care team and patient/family/caregiver  - Collaborate with rehabilitation services on mobility goals if consulted  - Perform Range of Motion  times a day  - Reposition patient every  hours  - Dangle patient  times a day  - Stand patient  times a day  - Ambulate patient  times a day  - Out of bed to chair  times a day   - Out of bed for meals times a day  - Out of bed for toileting  - Record patient progress and toleration of activity level   Outcome: Progressing     Problem: Knowledge Deficit  Goal: Patient/family/caregiver demonstrates understanding of disease process, treatment plan, medications, and discharge instructions  Description: Complete learning assessment and assess knowledge base    Interventions:  - Provide teaching at level of understanding  - Provide teaching via preferred learning methods  Outcome: Progressing     Problem: DISCHARGE PLANNING  Goal: Discharge to home or other facility with appropriate resources  Description: INTERVENTIONS:  - Identify barriers to discharge w/patient and caregiver  - Arrange for needed discharge resources and transportation as appropriate  - Identify discharge learning needs (meds, wound care, etc )  - Arrange for interpretive services to assist at discharge as needed  - Refer to Case Management Department for coordinating discharge planning if the patient needs post-hospital services based on physician/advanced practitioner order or complex needs related to functional status, cognitive ability, or social support system  Outcome: Progressing

## 2022-01-05 NOTE — H&P
H&P Exam - Obstetrics   William Eduardo 27 y o  female MRN: [id number]  Unit/Bed#: -01 Encounter: [id number]      ASSESSMENT:  34yo  at 37w0d weeks gestation who is being admitted for an IOL for GHTN  EFW: 7lbs 5oz (88%) on 21  VTX by transabdominal ultrasound     PLAN:    Pregnancy at 37w0d  Admit  Follow up CBC, RPR, Blood Type  Start with vaginal Cytotec   GBS negative status  Analgesia and/or epidural at patient request    Discussed with Dr Mana Lester      This patient will be an INPATIENT  and I certify the anticipated length of stay is >2 Midnights  History of Present Illness     Chief Complaint: IOL    HPI:  William Eduardo is a 27 y o   female with an MARIANN of 2022, by Last Menstrual Period at 37w0d weeks gestation who is being admitted for gestational hypertension  She is not feeling any contractions and denies leakage of fluid or vaginal bleeding  Reports good fetal movement  She is a [facility] patient  PREGNANCY COMPLICATIONS:   1  GHTN  2  Hypothyroidism  3  PCOS: IUI pregnancy   4  BMI 41    OB History    Para Term  AB Living   2 0 0 0 1 0   SAB IAB Ectopic Multiple Live Births   1 0 0 0 0      # Outcome Date GA Lbr Thomas/2nd Weight Sex Delivery Anes PTL Lv   2 Current            1 SAB 2021 5w0d              Baby complications/comments: None     Review of Systems   Constitutional: Negative for chills and fever  Eyes: Negative for visual disturbance  Respiratory: Negative for cough and shortness of breath  Cardiovascular: Negative for chest pain, palpitations and leg swelling  Gastrointestinal: Negative for abdominal pain, diarrhea, nausea and vomiting  Genitourinary: Negative for dysuria, hematuria, pelvic pain, vaginal bleeding, vaginal discharge and vaginal pain  Neurological: Negative for dizziness, light-headedness and headaches           Historical Information   Past Medical History:   Diagnosis Date    Gestational hypertension     Hypothyroidism     dx 1/2021    PCOS (polycystic ovarian syndrome)      History reviewed  No pertinent surgical history  Social History   Social History     Substance and Sexual Activity   Alcohol Use Not Currently     Social History     Substance and Sexual Activity   Drug Use Never     Social History     Tobacco Use   Smoking Status Former Smoker    Packs/day: 0 50    Years: 7 00    Pack years: 3 50    Quit date: 2019    Years since quitting: 3 0   Smokeless Tobacco Never Used     Family History: non-contributory    Meds/Allergies      Medications Prior to Admission   Medication    Cholecalciferol (Vitamin D3) 50 MCG (2000 UT) TABS    levothyroxine 50 mcg tablet    Prenatal Vit-Fe Fumarate-FA (PRENATAL VITAMIN AND MINERAL PO)        Allergies   Allergen Reactions    Co Q10 [Ubidecarenone] Hives       OBJECTIVE:    Vitals: Blood pressure 142/83, pulse 98, temperature 98 3 °F (36 8 °C), temperature source Oral, resp  rate 16, height 5' 6" (1 676 m), weight 117 kg (258 lb), last menstrual period 04/20/2021, SpO2 98 %, not currently breastfeeding  Body mass index is 41 64 kg/m²  Physical Exam  Constitutional:       General: She is not in acute distress  Appearance: She is well-developed  She is not ill-appearing or toxic-appearing  HENT:      Head: Normocephalic and atraumatic  Cardiovascular:      Rate and Rhythm: Normal rate  Abdominal:      Tenderness: There is no abdominal tenderness  There is no guarding  Comments: Gravid uterus   Genitourinary:     General: Normal vulva  Vagina: No vaginal discharge  Neurological:      General: No focal deficit present  Mental Status: She is alert and oriented to person, place, and time  Psychiatric:         Mood and Affect: Mood normal          Behavior: Behavior normal          Thought Content:  Thought content normal          Judgment: Judgment normal            Cervix:   1/70/-2    Fetal heart rate:   Baseline Rate: 145 bpm  Variability: Moderate 6-25 bpm  Accelerations: 15 x 15 or greater,At variable times  Decelerations: None  FHR Category: Category I    [place]:   None     GBS:  Negative    Prenatal Labs:   Blood Type:   Lab Results   Component Value Date/Time    ABO Grouping A 01/04/2022 09:42 PM     , D (Rh type):   Lab Results   Component Value Date/Time    Rh Factor Positive 01/04/2022 09:42 PM     ,HCT/HGB:   Lab Results   Component Value Date/Time    Hematocrit 36 1 01/04/2022 09:42 PM    Hemoglobin 12 2 01/04/2022 09:42 PM      , MCV:   Lab Results   Component Value Date/Time    MCV 90 01/04/2022 09:42 PM      , Platelets:   Lab Results   Component Value Date/Time    Platelets 913 74/92/6653 09:42 PM      , 1 hour Glucola:   Lab Results   Component Value Date/Time    Glucose 79 10/29/2021 12:32 PM    Rubella:   Lab Results   Component Value Date/Time    Rubella IgG Quant >175 0 07/24/2021 11:10 AM        , VDRL/RPR:   Lab Results   Component Value Date/Time    RPR Non-Reactive 10/29/2021 12:32 PM      , Urine Culture/Screen:   Lab Results   Component Value Date/Time    Urine Culture No Growth <1000 cfu/mL 07/24/2021 11:10 AM       Hep B:   Lab Results   Component Value Date/Time    Hepatitis B Surface Ag Non-reactive 07/24/2021 11:10 AM     ,HIV:   Lab Results   Component Value Date/Time    HIV-1/HIV-2 Ab Non-Reactive 07/24/2021 11:10 AM     , Chlamydia: Negative    Gonorrhea:   Lab Results   Component Value Date/Time    N gonorrhoeae, DNA Probe Negative 07/26/2021 05:19 PM     , Group B Strep:    Lab Results   Component Value Date/Time    Strep Grp B PCR Negative 12/21/2021 01:24 PM          Invasive Devices  Report    Peripheral Intravenous Line            Peripheral IV 01/04/22 Dorsal (posterior); Left Hand <1 day

## 2022-01-05 NOTE — PLAN OF CARE
Problem: BIRTH - VAGINAL/ SECTION  Goal: Fetal and maternal status remain reassuring during the birth process  Description: INTERVENTIONS:  - Monitor vital signs  - Monitor fetal heart rate  - Monitor uterine activity  - Monitor labor progression (vaginal delivery)  - DVT prophylaxis  - Antibiotic prophylaxis  Outcome: Progressing  Goal: Emotionally satisfying birthing experience for mother/fetus  Description: Interventions:  - Assess, plan, implement and evaluate the nursing care given to the patient in labor  - Advocate the philosophy that each childbirth experience is a unique experience and support the family's chosen level of involvement and control during the labor process   - Actively participate in both the patient's and family's teaching of the birth process  - Consider cultural, Tenriism and age-specific factors and plan care for the patient in labor  Outcome: Progressing     Problem: PAIN - ADULT  Goal: Verbalizes/displays adequate comfort level or baseline comfort level  Description: Interventions:  - Encourage patient to monitor pain and request assistance  - Assess pain using appropriate pain scale  - Administer analgesics based on type and severity of pain and evaluate response  - Implement non-pharmacological measures as appropriate and evaluate response  - Consider cultural and social influences on pain and pain management  - Notify physician/advanced practitioner if interventions unsuccessful or patient reports new pain  Outcome: Progressing     Problem: INFECTION - ADULT  Goal: Absence or prevention of progression during hospitalization  Description: INTERVENTIONS:  - Assess and monitor for signs and symptoms of infection  - Monitor lab/diagnostic results  - Monitor all insertion sites, i e  indwelling lines, tubes, and drains  - Monitor endotracheal if appropriate and nasal secretions for changes in amount and color  - [place] appropriate cooling/warming therapies per order  - Administer medications as ordered  - Instruct and encourage patient and family to use good hand hygiene technique  - Identify and instruct in appropriate isolation precautions for identified infection/condition  Outcome: Progressing  Goal: Absence of fever/infection during neutropenic period  Description: INTERVENTIONS:  - Monitor WBC    Outcome: Progressing     Problem: SAFETY ADULT  Goal: Patient will remain free of falls  Description: INTERVENTIONS:  - Educate patient/family on patient safety including physical limitations  - Instruct patient to call for assistance with activity   - Consult OT/PT to assist with strengthening/mobility   - Keep Call bell within reach  - Keep bed low and locked with side rails adjusted as appropriate  - Keep care items and personal belongings within reach  - Initiate and maintain comfort rounds  - Obtain necessary fall risk management equipment  - Apply yellow socks and bracelet for high fall risk patients  - Consider moving patient to room near nurses station  Outcome: Progressing  Goal: Maintain or return to baseline ADL function  Description: INTERVENTIONS:  -  Assess patient's ability to carry out ADLs; assess patient's baseline for ADL function and identify physical deficits which impact ability to perform ADLs (bathing, care of mouth/teeth, toileting, grooming, dressing, etc )  - Assess/evaluate cause of self-care deficits   - Assess range of motion  - Assess patient's mobility; develop plan if impaired  - Assess patient's need for assistive devices and provide as appropriate  - Encourage maximum independence but intervene and supervise when necessary  - Involve family in performance of ADLs  - Assess for home care needs following discharge   - Consider OT consult to assist with ADL evaluation and planning for discharge  - Provide patient education as appropriate  Outcome: Progressing  Goal: Maintains/Returns to pre admission functional level  Description: INTERVENTIONS:  - Perform BMAT or MOVE assessment daily    - Set and communicate daily mobility goal to care team and patient/family/caregiver  - Collaborate with rehabilitation services on mobility goals if consulted  - Reposition patient   - Dangle patient   - Stand patient   - Ambulate patient   - Out of bed to chair   - Out of bed for meals   - Out of bed for toileting  - Record patient progress and toleration of activity level   Outcome: Progressing     Problem: Knowledge Deficit  Goal: Patient/family/caregiver demonstrates understanding of disease process, treatment plan, medications, and discharge instructions  Description: Complete learning assessment and assess knowledge base    Interventions:  - Provide teaching at level of understanding  - Provide teaching via preferred learning methods  Outcome: Progressing     Problem: DISCHARGE PLANNING  Goal: Discharge to home or other facility with appropriate resources  Description: INTERVENTIONS:  - Identify barriers to discharge w/patient and caregiver  - Arrange for needed discharge resources and transportation as appropriate  - Identify discharge learning needs (meds, wound care, etc )  - Arrange for interpretive services to assist at discharge as needed  - Refer to Case Management Department for coordinating discharge planning if the patient needs post-hospital services based on physician/advanced practitioner order or complex needs related to functional status, cognitive ability, or social support system  Outcome: Progressing

## 2022-01-05 NOTE — OB LABOR/OXYTOCIN SAFETY PROGRESS
Oxytocin Safety Progress Check Note - Marnie Machado 27 y o  female MRN: [id number]    Unit/Bed#: -01 Encounter: [id number]    Dose (mateusz-units/min) Oxytocin: 6 mateusz-units/min  Contraction Frequency (minutes): 2-3  Contraction Quality: Moderate  Tachysystole: No   Cervical Dilation: 5        Cervical Effacement: 80  Fetal Station: -1  Baseline Rate: 155 bpm  Fetal Heart Rate: 155 BPM  FHR Category: Category I               Vital Signs:   Vitals:    01/05/22 1400   BP: 146/87   Pulse: 93   Resp:    Temp:    SpO2:            Notes/comments:    Due for check  SVE at this time 5/80/-1  FHT remains category I  Bulging bag noted on exam  Patient just received stadol, will plan for epidural after stadol has worn off and then AROM      Rocio Marcos MD 1/5/2022 2:13 PM

## 2022-01-06 LAB
ATRIAL RATE: 98 BPM
P AXIS: 34 DEGREES
PR INTERVAL: 156 MS
QRS AXIS: 82 DEGREES
QRSD INTERVAL: 80 MS
QT INTERVAL: 348 MS
QTC INTERVAL: 444 MS
T WAVE AXIS: 39 DEGREES
VENTRICULAR RATE: 98 BPM

## 2022-01-06 PROCEDURE — 93010 ELECTROCARDIOGRAM REPORT: CPT | Performed by: INTERNAL MEDICINE

## 2022-01-06 PROCEDURE — 93005 ELECTROCARDIOGRAM TRACING: CPT

## 2022-01-06 PROCEDURE — 99024 POSTOP FOLLOW-UP VISIT: CPT | Performed by: OBSTETRICS & GYNECOLOGY

## 2022-01-06 RX ADMIN — ACETAMINOPHEN 650 MG: 325 TABLET, FILM COATED ORAL at 12:02

## 2022-01-06 RX ADMIN — ACETAMINOPHEN 650 MG: 325 TABLET, FILM COATED ORAL at 17:52

## 2022-01-06 RX ADMIN — DOCUSATE SODIUM 100 MG: 100 CAPSULE ORAL at 08:11

## 2022-01-06 RX ADMIN — DOCUSATE SODIUM 100 MG: 100 CAPSULE ORAL at 17:52

## 2022-01-06 RX ADMIN — LEVOTHYROXINE SODIUM 50 MCG: 50 TABLET ORAL at 07:14

## 2022-01-06 NOTE — OB LABOR/OXYTOCIN SAFETY PROGRESS
Oxytocin Safety Progress Check Note - Anika Michaud 27 y o  female MRN: [id number]    Unit/Bed#: -01 Encounter: [id number]    Dose (mateusz-units/min) Oxytocin: 8 mateusz-units/min  Contraction Frequency (minutes): 1-5  Contraction Quality: Moderate  Tachysystole: No   Cervical Dilation: Lip/rim (Comment)        Cervical Effacement: 100  Fetal Station: 2  Baseline Rate: 155 bpm  Fetal Heart Rate: 156 BPM  FHR Category: Category I               Vital Signs:   Vitals:    01/05/22 1900   BP: 145/84   Pulse: (!) 114   Resp: 18   Temp: 98 8 °F (37 1 °C)   SpO2: 98%           Notes/comments:   Patient feeling more pressure and SVE now 9 5/100/+2  FHT category 1 w/ contractions every 1-4 min  Pit running at 8, continue to titrate to contractions  Dr Eufemia Casillas aware         Alba Martinez MD 1/5/2022 7:18 PM

## 2022-01-06 NOTE — PLAN OF CARE
Problem: BIRTH - VAGINAL/ SECTION  Goal: Fetal and maternal status remain reassuring during the birth process  Description: INTERVENTIONS:  - Monitor vital signs  - Monitor fetal heart rate  - Monitor uterine activity  - Monitor labor progression (vaginal delivery)  - DVT prophylaxis  - Antibiotic prophylaxis  2022 by Daniela Sam RN  Outcome: Completed  2022 by Daniela Sam RN  Outcome: Progressing  Goal: Emotionally satisfying birthing experience for mother/fetus  Description: Interventions:  - Assess, plan, implement and evaluate the nursing care given to the patient in labor  - Advocate the philosophy that each childbirth experience is a unique experience and support the family's chosen level of involvement and control during the labor process   - Actively participate in both the patient's and family's teaching of the birth process  - Consider cultural, Judaism and age-specific factors and plan care for the patient in labor  2022 by Daniela Sam RN  Outcome: Completed  2022 by Daniela Sam RN  Outcome: Progressing

## 2022-01-06 NOTE — DISCHARGE INSTRUCTIONS
Self Care After Delivery   AMBULATORY CARE:   The postpartum period  is the period of time from delivery to about 6 weeks  During this time you may experience many physical and emotional changes  It is important to understand what is normal and when you need to call your healthcare provider  It is also important to know how to care for yourself during this time  Call your local emergency number (911 in the [address] [facility] [facility]) for any of the following:   · You see or hear things that are not there, or have thoughts of harming yourself or your baby  · You soak through 1 pad in 15 minutes, have blurry vision, clammy or pale skin, and feel faint  · You faint or lose consciousness  · You have trouble breathing  · You cough up blood  · Your  incision comes apart  Seek care immediately if:   · Your heart is beating faster than usual     · You have a bad headache or changes in your vision  · Your episiotomy or  incision is red, swollen, bleeding, or draining pus  · You have severe abdominal pain  Call your doctor or obstetrician if:   · Your leg is painful, red, and larger than usual     · You soak through 1 or more pads in an hour, or pass blood clots larger than a quarter from your vagina  · You have a fever  · You have new or worsening pain in your abdomen or vagina  · You continue to have depression 1 to 2 weeks after you deliver  · You have trouble sleeping  · You have foul-smelling discharge from your vagina  · You have pain or burning when you urinate  · You do not have a bowel movement for 3 days or more  · You have nausea or are vomiting  · You have hard lumps or red streaks over your breasts  · You have cracked nipples or bleed from your nipples  · You have questions or concerns about your condition or care  Physical changes:   The following are normal changes after you give birth:  · Pain in the area between your anus and vagina    · Breast pain    · Constipation or hemorrhoids    · Hot or cold flashes    · Vaginal bleeding or discharge    · Mild to moderate abdominal cramping    · Difficulty controlling bowel movements or urine    Emotional changes:  A drop in hormone levels after you deliver may cause changes in your emotions  You may feel irritable, sad, or anxious  You may cry easily or for no reason  You may also feel depressed  Depression that continues can be a sign of postpartum depression, a condition that can be treated  Treatment may include talk therapy, medicines, or both  Healthcare providers will ask how you are feeling and if you have any depression  These talks can happen during appointments for your medical care and for your baby's care, such as well child visits  Providers can help you find ways to care for yourself and your baby  Talk to your providers about the following:  · When emotional changes or depression started, and if it is getting worse over time    · Problems you are having with daily activities, sleep, or caring for your baby    · If anything makes you feel worse, or makes you feel better    · Feeling that you are not bonding with your baby the way you want    · Any problems your baby has with sleeping or feeding    · Your baby is fussy or cries a lot    · Support you have from friends, family, or others    Breast care for breastfeeding mothers: You may have sore breasts for 3 to 6 days after you give birth  This happens as your milk begins to fill your breasts  You may also have sore breasts if you do not breastfeed frequently  Do the following to care for your breasts:  · Apply a moist, warm, compress to your breast as directed  This may help soothe your breasts  Make sure the washcloth is not too hot before you apply it to your breast     · Nurse your baby or pump your milk frequently  This may prevent clogged milk ducts  Ask your healthcare provider how often to nurse or pump      · Massage your breasts as directed  This may help increase your milk flow  Gently rub your breasts in a circular motion before you breastfeed  You may need to gently squeeze your breast or nipple to help release milk  You can also use a breast pump to help release milk from your breast     · Wash your breasts with warm water only  Do not put soap on your nipples  Soap may cause your nipples to become dry  · Apply lanolin cream to your nipples as directed  Lanolin cream may add moisture to your skin and prevent nipple dryness  Always  wash off lanolin cream with warm water before you breastfeed  · Place pads in your bra  Your nipples may leak milk when you are not breastfeeding  You can place pads inside of your bra to help prevent leaking onto your clothing  Ask your healthcare provider where to purchase bra pads  · Get breastfeeding support if needed  Healthcare providers can answer questions about breastfeeding and provide you with support  Ask your healthcare provider who you can contact if you need breastfeeding support  Breast care for non-breastfeeding mothers:  Milk will fill your breasts even if you bottle feed your baby  Do the following to help stop your milk from filling your breasts and causing pain:  · Wear a bra with support at all times  A sports bra or a tight-fitting bra will help stop your milk from coming in  · Apply ice on each breast for 15 to 20 minutes every hour or as directed  Use an ice pack, or put crushed ice in a plastic bag  Cover it with a towel before you apply it to your breast  Ice helps your milk ducts shrink  · Keep your breasts away from warm water  Warm water will make it easier for milk to fill your breasts  Stand with your breasts away from warm water in the shower  · Limit how much you touch your breasts  This will prevent them from filling with milk  Perineum care: Your perineum is the area between your rectum and vagina   It is normal to have swelling and pain in this area after you give birth  If you had an episiotomy, your healthcare provider may give you special instructions  · Clean your perineum after you use the bathroom  This may prevent infection and help with healing  Use a spray bottle with warm water to clean your perineum  You may also gently spray warm water against your perineum when you urinate  Always wipe front to back  · Take a sitz bath as directed  A sitz bath may help relieve swelling and pain  Fill your bath tub or bucket with water up to your hips and sit in the water  Use cold water for 2 days after you deliver  Then use warm water  Ask your healthcare provider for more information about a sitz bath  · Apply ice packs for the first 24 hours or as directed  Use a plastic glove filled with ice or buy an ice pack  Wrap the ice pack or plastic glove in a small towel or wash cloth  Place the ice pack on your perineum for 20 minutes at a time  · Sit on a donut-shaped pillow  This may relieve pressure on your perineum when you sit  · Use wipes that contain medicine or take pills as directed  Your healthcare provider may tell you to use witch hazel pads  You can place witch hazel pads in the refrigerator before you apply them to your perineum  Your provider may also tell you to take NSAIDs  Ask him or her how often to take pills or use the wipes  · Do not go swimming or take tub baths for 4 to 6 weeks or as directed  This will help prevent an infection in your vagina or uterus  Bowel and bladder care: It may take 3 to 5 days to have a bowel movement after you deliver your baby  You can do the following to prevent or manage constipation, and get control of your bowel or bladder:  · Take stool softeners as directed  A stool softener is medicine that will make your bowel movements softer  This may prevent or relieve constipation  A stool softener may also make bowel movements less painful  · Drink plenty of liquids    Ask how much liquid to drink each day and which liquids are best for you  Liquids may help prevent constipation  · Eat foods high in fiber  Examples include fruits, vegetables, grains, beans, and lentils  Ask your healthcare provider how much fiber you need each day  Fiber may prevent constipation  · Do Kegel exercises as directed  Kegel exercises will help strengthen the muscles that control bowel movements and urination  Ask your healthcare provider for more information on Kegel exercises  · Apply cold compresses or medicine to hemorrhoids as directed  This may relieve swelling and pain  Your healthcare provider may tell you to apply ice or wipes that contain medicine to your hemorrhoids  He or she may also tell you to use a sitz bath  Ask your provider for more information on how to manage hemorrhoids  Nutrition:  Good nutrition is important in the postpartum period  It will help you return to a healthy weight, increase your energy levels, and prevent constipation  It will also help you get enough nutrients and calories if you are going to breastfeed your baby  · Eat a variety of healthy foods  Healthy foods include fruits, vegetables, whole-grain breads, low-fat dairy products, beans, lean meats, and fish  You may need 500 to 700 extra calories each day if you breastfeed your baby  You may also need extra protein  · Limit foods with added sugar and high amounts of fat  These foods are high in calories and low in healthy nutrients  Read food labels so you know how much sugar and fat is in the food you want to eat  · Drink 8 to 10 glasses of water per day  Water will help you make plenty of milk for your baby  It will also help prevent constipation  Drink a glass of water every time you breastfeed your baby  · Take vitamins as directed  Ask your healthcare provider what vitamins you need  · Limit caffeine and alcohol if you are breastfeeding    Caffeine and alcohol can get into your breast milk  Caffeine and alcohol can make your baby fussy  They can also interfere with your baby's sleep  Ask your healthcare provider if you can drink alcohol or caffeine  Rest and sleep: You may feel very tired in the postpartum period  Enough sleep will help you heal and give you energy to care for your baby  The following may help you get sleep and rest:  · Nap when your baby naps  Your baby may nap several times during the day  Get rest during this time  · Limit visitors  Many people may want to see you and your baby  Ask friends or family to visit on different days  This will give you time to rest     · Do not plan too much for one day  Put off household chores so that you have time to rest  Gradually do more each day  · Ask for help from family, friends, or neighbors  Ask them to help you with laundry, cleaning, or errands  Also ask someone to watch the baby while you take a nap or relax  Ask your partner to help with the care of your baby  Pump some of your breast milk so your partner can feed your baby during the night  Exercise after delivery:  Wait until your healthcare provider says it is okay to exercise  Exercise can help you lose weight, increase your energy levels, and manage your mood  It can also prevent constipation and blood clots  Start with gentle exercises such as walking  Do more as you have more energy  You may need to avoid abdominal exercises for 1 to 2 weeks after you deliver  Talk to your healthcare provider about an exercise plan that is right for you  Sexual activity after delivery:   · Do not have sex until your healthcare provider says it is okay  You may need to wait 4 to 6 weeks before you have sex  This may prevent infection and allow time to heal     · Your menstrual cycle may begin as soon as 3 weeks after you deliver  Your period may be delayed if you breastfeed your baby  You can become pregnant before you get your first postpartum period   Talk to your healthcare provider about birth control that is right for you  Some types of birth control are not safe during breastfeeding  For support and more information:  Join a support group for new mothers  Ask for help from family and friends with chores, errands, and care of your baby  · Office of Women's Health,  Department of Health and Human Services  [address], [address] [facility] , [place] [address]  [address], [address] [facility] [place] [address]  Phone: [phone]  Web Address: www womenshealth gov    · [facility] [address] [facility] , [address]  [address] [facility] , [address] [facility]  Web Address: [employer] be  [facility]/pregnancy/[email or web address]-care  aspx    Follow up with your doctor or obstetrician as directed: You will need to follow up within 2 to 6 weeks of delivery  Write down your questions so you remember to ask them at your visits  © Copyright [employer] 2021 Information is for End User's use only and may not be sold, redistributed or otherwise used for commercial purposes  All illustrations and images included in CareNotes® are the copyrighted property of [employer]  or [address] [place], [place] [address]p[place]an   The above information is an  only  It is not intended as medical advice for individual conditions or treatments  Talk to your doctor, nurse or pharmacist before following any medical regimen to see if it is safe and effective for you

## 2022-01-06 NOTE — LACTATION NOTE
This note was copied from a baby's chart  Baby awake placed skin to skin  latches well, on and off, for a few sucks, Mom positions and supports baby independently after review

## 2022-01-06 NOTE — PLAN OF CARE
Problem: PAIN - ADULT  Goal: Verbalizes/displays adequate comfort level or baseline comfort level  Description: Interventions:  - Encourage patient to monitor pain and request assistance  - Assess pain using appropriate pain scale  - Administer analgesics based on type and severity of pain and evaluate response  - Implement non-pharmacological measures as appropriate and evaluate response  - Consider cultural and social influences on pain and pain management  - Notify physician/advanced practitioner if interventions unsuccessful or patient reports new pain  Outcome: Progressing     Problem: INFECTION - ADULT  Goal: Absence or prevention of progression during hospitalization  Description: INTERVENTIONS:  - Assess and monitor for signs and symptoms of infection  - Monitor lab/diagnostic results  - Monitor all insertion sites, i e  indwelling lines, tubes, and drains  - Monitor endotracheal if appropriate and nasal secretions for changes in amount and color  - [place] appropriate cooling/warming therapies per order  - Administer medications as ordered  - Instruct and encourage patient and family to use good hand hygiene technique  - Identify and instruct in appropriate isolation precautions for identified infection/condition  Outcome: Progressing  Goal: Absence of fever/infection during neutropenic period  Description: INTERVENTIONS:  - Monitor WBC    Outcome: Progressing     Problem: SAFETY ADULT  Goal: Patient will remain free of falls  Description: INTERVENTIONS:  - Educate patient/family on patient safety including physical limitations  - Instruct patient to call for assistance with activity   - Consult OT/PT to assist with strengthening/mobility   - Keep Call bell within reach  - Keep bed low and locked with side rails adjusted as appropriate  - Keep care items and personal belongings within reach  - Initiate and maintain comfort rounds  - Make Fall Risk Sign visible to staff  - Offer Toileting every  Hours, in advance of need  - Initiate/Maintain alarm  - Obtain necessary fall risk management equipment:   - Apply yellow socks and bracelet for high fall risk patients  - Consider moving patient to room near nurses station  Outcome: Progressing  Goal: Maintain or return to baseline ADL function  Description: INTERVENTIONS:  -  Assess patient's ability to carry out ADLs; assess patient's baseline for ADL function and identify physical deficits which impact ability to perform ADLs (bathing, care of mouth/teeth, toileting, grooming, dressing, etc )  - Assess/evaluate cause of self-care deficits   - Assess range of motion  - Assess patient's mobility; develop plan if impaired  - Assess patient's need for assistive devices and provide as appropriate  - Encourage maximum independence but intervene and supervise when necessary  - Involve family in performance of ADLs  - Assess for home care needs following discharge   - Consider OT consult to assist with ADL evaluation and planning for discharge  - Provide patient education as appropriate  Outcome: Progressing  Goal: Maintains/Returns to pre admission functional level  Description: INTERVENTIONS:  - Perform BMAT or MOVE assessment daily    - Set and communicate daily mobility goal to care team and patient/family/caregiver  - Collaborate with rehabilitation services on mobility goals if consulted  - Perform Range of Motion  times a day  - Reposition patient every  hours    - Dangle patient  times a day  - Stand patient  times a day  - Ambulate patient  times a day  - Out of bed to chair  times a day   - Out of bed for meals  times a day  - Out of bed for toileting  - Record patient progress and toleration of activity level   Outcome: Progressing     Problem: Knowledge Deficit  Goal: Patient/family/caregiver demonstrates understanding of disease process, treatment plan, medications, and discharge instructions  Description: Complete learning assessment and assess knowledge base   Interventions:  - Provide teaching at level of understanding  - Provide teaching via preferred learning methods  Outcome: Progressing     Problem: DISCHARGE PLANNING  Goal: Discharge to home or other facility with appropriate resources  Description: INTERVENTIONS:  - Identify barriers to discharge w/patient and caregiver  - Arrange for needed discharge resources and transportation as appropriate  - Identify discharge learning needs (meds, wound care, etc )  - Arrange for interpretive services to assist at discharge as needed  - Refer to Case Management Department for coordinating discharge planning if the patient needs post-hospital services based on physician/advanced practitioner order or complex needs related to functional status, cognitive ability, or social support system  Outcome: Progressing     Problem: POSTPARTUM  Goal: Experiences normal postpartum course  Description: INTERVENTIONS:  - Monitor maternal vital signs  - Assess uterine involution and lochia  Outcome: Progressing  Goal: Appropriate maternal -  bonding  Description: INTERVENTIONS:  - Identify family support  - Assess for appropriate maternal/infant bonding   -Encourage maternal/infant bonding opportunities  - Referral to  or  as needed  Outcome: Progressing  Goal: Establishment of infant feeding pattern  Description: INTERVENTIONS:  - Assess breast/bottle feeding  - Refer to lactation as needed  Outcome: Progressing  Goal: Incision(s), wounds(s) or drain site(s) healing without S/S of infection  Description: INTERVENTIONS  - Assess and document dressing, incision, wound bed, drain sites and surrounding tissue  - Provide patient and family education  - Perform skin care/dressing changes every   Outcome: Progressing

## 2022-01-06 NOTE — PLAN OF CARE

## 2022-01-06 NOTE — L&D DELIVERY NOTE
Vaginal Delivery Summary - OB/GYN   Logan Allen 27 y o  female MRN: [id number]  Unit/Bed#: -01 Encounter: [id number]      Pre-delivery Diagnosis:   1  Pregnancy at 37w1d   2  GHTN  3  Subclinical hypothyroidism  4  Obesity (BMI 41)  5  PCOS    Post-delivery Diagnosis: same, delivered    Procedure: Spontaneous Vaginal Delivery; second degree laceration repair    Attending: Dr Saulo Paz    Assistant(s): Dr Radha Bolden    Anesthesia: Epidural    QBL: 373 mL    Complications: none apparent    Specimens:   1  Arterial and venous cord gases  2  Cord blood  3  Segment of umbilical cord  4  Placenta to pathology for GHTN     Findings:  1  Viable female on 2022 at , with APGARS of 9 and 9 at 1 and 5 minutes respectively,  2  Spontaneous delivery of intact placenta at   3  2 degree laceration repaired with 3-0 Vicryl  4  Blood gases:   Arterial pH: 7 205   Arterial base excess: -6 5   Venous pH: 7 348   Venous base excess: -5 4    Disposition:  Patient tolerated the procedure well and was recovering in labor and delivery room       Brief history and labor course: Logan Allen is a 27 y o   at 37w0d wks who was initially admitted for an induction of labor for gestational hypertension  On presentation, her cervix was 1/70/-2 and she received one dose of vaginal Cytotec and a murray balloon for cervical ripening  Once the murray balloon was expelled, she was started on a pitocin titration  She received both Stadol and an epidural for analgesia  An amniotomy was performed with clear fluids and she progressed to complete cervical dilation prior to pushing  Description of procedure:  After pushing for 29 minutes, at  patient delivered a viable female , wt pending, apgars of 9 (1 min) and 9 (5 min)  The fetal vertex delivered spontaneously  There was no nuchal cord   The left anterior shoulder delivered atraumatically with maternal expulsive forces and the assistance of gentle downward traction  The right posterior shoulder delivered with maternal expulsive forces and the assistance of gentle upward traction  The remainder of the fetus delivered spontaneously  Upon delivery, the infant was placed on the mothers abdomen and the cord was clamped and cut  The infant was noted to cry spontaneously and was moving all extremities appropriately  There was no evidence for injury  Awaiting nurse resuscitators evaluated the   Arterial and venous cord blood gases and cord blood was collected for analysis  These were promptly sent to the lab  In the immediate post-partum, 30 units of IV pitocin was administered, and the uterus was noted to contract down well with massage and pitocin  The placenta delivered spontaneously at  and was noted to have a centrally inserted 3 vessel cord  The vagina, cervix, perineum, and rectum were inspected and there was noted to be a second degree perineal laceration  Laceration Repair  Patient was comfortable with epidural at that time  A second degree perineal laceration was identified and required repair  Laceration was repaired with 3-0 Vicryl in running locked fashion to reapproximate the laceration  Good hemostasis was confirmed at the conclusion of this procedure  The fundus was firm and at the level of the umbilicus  At the conclusion of the procedure, all needle, sponge, and instrument counts were noted to be correct  Patient tolerated the procedure well and was allowed to recover in labor and delivery room with family and  before being transferred to the post-partum floor  Dr Shin Wallace was present and participated in all key portions of the case        Maria C Jacobsen MD  OB/GYN PGY-1  2022  8:46 PM

## 2022-01-06 NOTE — PROGRESS NOTES
Progress Note - OB/GYN   Magdaline Prophet 27 y o  female MRN: [id number]  Unit/Bed#: -01 Encounter: [id number]    Assessment: PPD#1 s/p Spontaneous Vaginal Delivery and second degree perineal laceration repair  Plan:    1) Postpartum care    - Encourage ambulation  - Encourage breastfeeding  - Continue current meds     2) Gestational HTN     BP have ranged from 120's - 160's / 60's - 100's  [place] 3) Asymptomatic Tachycardia     HR has been consistently elevated and have ranged from 80's - 140's  ECG was ordered  4) Patient plans to breastfeed  5) Contraception: No - IUI     6) Disposition: Anticipate discharge home today or tomorrow  Patient would like to leave today  Subjective/Objective     Subjective: "I am doing fine"  Pain: 1 - 2 out of 10 - soreness  Tolerating PO: Yes  Voiding: Yes  Flatus: Yes  BM: No   Ambulating: Yes  Feeding: Breastfeeding  Chest pain: No  Shortness of breath: No  Leg pain: No  Lochia: Thin and minor     Objective:     Vitals:  Vitals:    01/05/22 2215 01/05/22 2230 01/06/22 0019 01/06/22 0401   BP: 145/68 147/74 136/72 122/67   BP Location:   Right arm Right arm   Pulse: (!) 113 (!) 121 (!) 123 (!) 109   Resp:   18 18   Temp:   98 4 °F (36 9 °C) 98 5 °F (36 9 °C)   TempSrc:   Oral Oral   SpO2:   97% 99%   Weight:       Height:           Physical Exam:     GEN: Appears well, alert and oriented x 3, pleasant and cooperative   CV: Regular rate  RESP: Non labored breathing  ABDOMEN: Soft, no tenderness, no distention, Uterine fundus firm and non-tender, -2 cm below the umbilicus   EXTREMITIES: Non-tender  NEURO Alert and oriented to person, place, and time  Lab Results   Component Value Date    WBC 12 03 (H) 01/04/2022    HGB 12 2 01/04/2022    HCT 36 1 01/04/2022    MCV 90 01/04/2022     01/04/2022     Katey Zuñiga MD , MSMS     [address] [id number] - [place]echelle Portillo     01/06/22

## 2022-01-06 NOTE — DISCHARGE SUMMARY
Discharge Summary - OB/GYN   Yayo Stone 27 y o  female MRN: [id number]  Unit/Bed#: -01 Encounter: [id number]      Admission Date: 2022     Discharge Date: 2022    Admitting Diagnosis:   1  Pregnancy at 37w1d  2  Gestational Hypertension  3  Hypothyroidism  4  PCOS: IUI pregnancy   5  BMI 41      Discharge Diagnosis:   Same, delivered      Procedures: spontaneous vaginal delivery; second degree perineal laceration repair    Admitting Attending: Darrius Velazco MD  Delivery Attending: Dr Clemencia Morelos  Discharge Attending: Dr Latha Mensah Course: Yayo Stone is a 27 y o   at 37w0d wks who was initially admitted for an induction of labor for gestational hypertension  On presentation, her cervix was 1/70/-2 and she received one dose of vaginal Cytotec and a murray balloon for cervical ripening  Once the murray balloon was expelled, she was started on a pitocin titration  She received both Stadol and an epidural for analgesia  An amniotomy was performed with clear fluids and she progressed to complete cervical dilation prior to pushing  She delivered a viable female  on 22 at   Weight 6lbs 7 7oz via spontaneous vaginal delivery  Apgars were 9 (1 min) and 9 (5 min)   remained in the delivery room after birth  Patient tolerated the procedure well and was transferred to recovery in stable condition  Her post-partum course was uncomplicated  Her post-partum pain was well controlled with oral analgesics  On day of discharge, she was ambulating and able to reasonably perform all ADLs  She was voiding and had appropriate bowel function  Pain was well controlled  She was discharged home on post-partum day #2 without complications  Patient was instructed to follow up with her OB as an outpatient and was given appropriate warnings to call provider if she develops signs of infection or uncontrolled pain      Complications: none apparent    Condition at discharge: good     Discharge instructions/Information to patient and family:   See after visit summary for information provided to patient and family  Provisions for Follow-Up Care:  See after visit summary for information related to follow-up care and any pertinent home health orders  Disposition: Home    Planned Readmission: No    Discharge Medications: For a complete list of the patient's medications, please refer to her med rec        Chloé Carter MD  Obstetrics & Gynecology PGY-1  1/7/2022  6:45 AM

## 2022-01-06 NOTE — OB LABOR/OXYTOCIN SAFETY PROGRESS
Oxytocin Safety Progress Check Note - Maeve Staff 27 y o  female MRN: [id number]    Unit/Bed#: -01 Encounter: [id number]    Dose (mateusz-units/min) Oxytocin: 8 mateusz-units/min  Contraction Frequency (minutes): 1-5  Contraction Quality: Moderate  Tachysystole: No   Cervical Dilation: 10  Dilation Complete Date: 01/05/22  Dilation Complete Time: 1946  Cervical Effacement: 100  Fetal Station: 3  Baseline Rate: 155 bpm  Fetal Heart Rate: 156 BPM  FHR Category: Category I               Vital Signs:   Vitals:    01/05/22 1900   BP: 145/84   Pulse: (!) 114   Resp: 18   Temp: 98 8 °F (37 1 °C)   SpO2: 98%           Notes/comments:   Patient feeling increased pressure  SVE now 10/100/+3  Will start pushing momentarily  Dr Clemencia Morelos aware         Carlos Tenorio MD 1/5/2022 7:52 PM

## 2022-01-06 NOTE — ANESTHESIA POSTPROCEDURE EVALUATION
Post-Op Assessment Note    CV Status:  Stable    Pain management: adequate     Mental Status:  Alert and awake   Hydration Status:  Euvolemic   PONV Controlled:  Controlled   Airway Patency:  Patent      Post Op Vitals Reviewed: Yes      Staff: CRNA     Post-op block assessment: no complications, site cleaned and catheter intact      No complications documented      /81 (01/05/22 2145)    Temp      Pulse (!) 120 (01/05/22 2145)   Resp      SpO2

## 2022-01-06 NOTE — LACTATION NOTE
This note was copied from a baby's chart  CONSULT - LACTATION  Baby Girl Deena Gay) Jas Slaughter 1 days female MRN: [id number]    [facility] NURSERY Room / Bed: (N)/ 303(N) Encounter: [id number]    Maternal Information     MOTHER:  Rachel Doe  Maternal Age: 27 y o    OB History: # 1 - Date: 2021, Sex: None, Weight: None, GA: 5w0d, Delivery: None, Apgar1: None, Apgar5: None, Living: None, Birth Comments: None    # 2 - Date: 22, Sex: Female, Weight: 2940 g (6 lb 7 7 oz), GA: 37w1d, Delivery: Vaginal, Spontaneous, Apgar1: 9, Apgar5: 9, Living: Living, Birth Comments: None   Previouse breast reduction surgery? No    Lactation history:   Has patient previously breast fed: No   How long had patient previously breast fed:     Previous breast feeding complications:     History reviewed  No pertinent surgical history  Birth information:  YOB: 2023   Time of birth: 8:26 PM   Sex: female   Delivery type: Vaginal, Spontaneous   Birth Weight: 2940 g (6 lb 7 7 oz)   Percent of Weight Change: 0%     Gestational Age: 42w4d   [unfilled]    Assessment     Breast and nipple assessment: normal assessment     Assessment: sleepy    Feeding assessment: no latch  LATCH:  Latch: Too sleepy or reluctant, no latch achieved   Audible Swallowing: None   Type of Nipple: Everted (After stimulation)   Comfort (Breast/Nipple): Soft/non-tender   Hold (Positioning): Partial assist, teach one side, mother does other, staff holds   [facility] Score: 5          Feeding recommendations:  breast feed on demand     Met with mother  Provided mother with Ready, Set, Baby booklet  Discussed Skin to Skin contact an benefits to mom and baby  Talked about the delay of the first bath until baby has adjusted  Spoke about the benefits of rooming in  Feeding on cue and what that means for recognizing infant's hunger  Avoidance of pacifiers for the first month discussed   Talked about exclusive breastfeeding for the first 6 months  Positioning and latch reviewed as well as showing images of other feeding positions  Discussed the properties of a good latch in any position  Reviewed hand/manual expression  Discussed s/s that baby is getting enough milk and some s/s that breastfeeding dyad may need further help  Gave information on common concerns, what to expect the first few weeks after delivery, preparing for other caregivers, and how partners can help  Resources for support also provided  Information on hand expression given  Discussed benefits of knowing how to manually express breast including stimulating milk supply, softening nipple for latch and evacuating breast in the event of engorgement  Discussed 2nd night syndrome and ways to calm infant  Hand out given  Assisted Mom to position baby in football hold  Discussed importance of alignment of baby's ear, shoulder, and hip in any preferred position  Worked on supporting baby at breast level and beginning the feed with baby's nose arriving at the nipple  Then, using areolar compression while guiding baby chin-forward to the breast to achieve a deep, comfortable latch  Baby is sleepy and does not gape at this time  Plan to attempt with baby skin to skin when she shows more cues  Ext provided       Kenyatta Fermin RN 1/6/2022 12:17 PM

## 2022-01-07 VITALS
SYSTOLIC BLOOD PRESSURE: 133 MMHG | WEIGHT: 258 LBS | BODY MASS INDEX: 41.46 KG/M2 | RESPIRATION RATE: 18 BRPM | HEIGHT: 66 IN | DIASTOLIC BLOOD PRESSURE: 78 MMHG | OXYGEN SATURATION: 98 % | HEART RATE: 97 BPM | TEMPERATURE: 98.1 F

## 2022-01-07 PROCEDURE — 99024 POSTOP FOLLOW-UP VISIT: CPT | Performed by: STUDENT IN AN ORGANIZED HEALTH CARE EDUCATION/TRAINING PROGRAM

## 2022-01-07 RX ORDER — ACETAMINOPHEN 325 MG/1
650 TABLET ORAL EVERY 4 HOURS PRN
Refills: 0
Start: 2022-01-07 | End: 2022-01-25 | Stop reason: ALTCHOICE

## 2022-01-07 RX ORDER — IBUPROFEN 600 MG/1
600 TABLET ORAL EVERY 6 HOURS PRN
Qty: 30 TABLET | Refills: 0
Start: 2022-01-07 | End: 2022-01-25 | Stop reason: ALTCHOICE

## 2022-01-07 RX ADMIN — IBUPROFEN 600 MG: 600 TABLET ORAL at 09:43

## 2022-01-07 RX ADMIN — DOCUSATE SODIUM 100 MG: 100 CAPSULE ORAL at 09:43

## 2022-01-07 RX ADMIN — IBUPROFEN 600 MG: 600 TABLET ORAL at 02:37

## 2022-01-07 RX ADMIN — LEVOTHYROXINE SODIUM 50 MCG: 50 TABLET ORAL at 05:27

## 2022-01-07 NOTE — LACTATION NOTE
This note was copied from a baby's chart  Discharge Lactation: Mom was getting ready to feed Judy  Reviewed s2s, hand expression prior to latch and positioning  Education was provided on positioning with alignment, use of pillows, ear, shoulder, hip alignment  Assisted with latching to the left breast in football  Active, coordinated sucking  Education on timing of feeds, signs of satiation and breast compressions  Encouraged to call lactation for next feeding  Offered baby and me appt - mom declines  Reviewed d/c booklet    Provided education on alignment of nose to breast; bring baby to breast and not breast to baby; support head with opp  Hand in cross cradle; use pillows to lift baby to be belly to belly; ear, shoulder, hip alignment; Support mother's back and place self in comfortable position to support bringing baby to the breast  Shoulders should be down and away from ears  - Start feedings on breast that last feeding ended   - allow no more than 3 hours between breast feeding sessions   - time between feedings is counted from the beginning of the first feed to the beginning of the next feeding session    Reviewed early signs of hunger, including tensing of hands and shoulders - no need to wait for open eyes  Crying is a late hunger sign  If baby is crying, soothe baby first and then attempt to latch  Reviewed normal sucking patterns: transition from stimulation to nutritive to release or non-nutritive  The goal is to see and hear lots of swallowing  Reviewed normal nursing pattern: infant could latch on one breast up to 30 minutes or until releases on own  Signs of satiation is open hand with fingers that do not grab your finger  Discussed difference in sensation of non-nutritive v nutritive sucking    Demonstrated with teach back breast compressions during a feeding to increase milk transfer and stimulate suckling after a breathing/muscle break       Met with mother to go over discharge breastfeeding booklet including the feeding log  Emphasized 8 or more (12) feedings in a 24 hour period, what to expect for the number of diapers per day of life and the progression of properties of the  stooling pattern  Reviewed breastfeeding and your lifestyle, storage and preparation of breast milk, how to keep you breast pump clean, the employed breastfeeding mother and paced bottle feeding handouts  Booklet included Breastfeeding Resources for after discharge including access to the number for the [address]  Provided education on growth spurts, when to introduce bottles; paced bottle feeding, and non-nutritive suck at the breast  Provided education on Signs of satiation  Encouraged to call lactation to observe a latch prior to discharge for reassurance  Encouraged to call baby and me with any questions and closely monitor output

## 2022-01-07 NOTE — LACTATION NOTE
This note was copied from a baby's chart  6 Bottle of Donor Breast Milk for home  6 Bottles approved by NICU Admin  Siobhan  LOT #   Exp   Date    750295-4 (28)  03/30/22  [id number] (15)  03/30/22  [id number] (12)  03/30/22  [id number] (26)  03/30/22  [id number] (33)  03/30/22  [id number] (23)  03/30/22

## 2022-01-07 NOTE — LACTATION NOTE
This note was copied from a baby's chart  Latch Assist: mom is using donor milk at the breast due to high bilirubin  30 mls of DBM with SNS on the left breast in laid back position  Education on positioning, alignment  Baby took 15 mls on left breast  Burped and moved to right breast  Baby took 10 mls on right breast in laid back  MOB requested finger feeding as an option  SNS supplied with instructions on care and duration the device may be used (24 hours)  Showed MOB how to use SNS with proper return demonstration  Education on cluster feeding, signs of satiation, timing of feeds, breast compressions and use of SNS  Mom and Dad want donor milk for home  Received paperwork - will take extra for weekend  Enc  To call lactation for next latch  Provided education on alignment of nose to breast; bring baby to breast and not breast to baby; support head with opp  Hand in cross cradle; use pillows to lift baby to be belly to belly; ear, shoulder, hip alignment; Support mother's back and place self in comfortable position to support bringing baby to the breast  Shoulders should be down and away from ears  Provided demonstration, education and support of deep latch to breast by placing the nipple to the nose, dragging down to chin to achieve a wide latch  Bring baby to the breast, not breast to baby  Move your shoulders down and away from your ears  Look for ear, shoulder, hip alignment  Baby's upper and lower lip should be flanged on the breast       Discussed 2nd night syndrome and ways to calm infant  Hand out given  Information on hand expression given  Discussed benefits of knowing how to manually express breast including stimulating milk supply, softening nipple for latch and evacuating breast in the event of engorgement  Education provided on growth spurts and cluster feedings  There are 4 growth spurts before 12 weeks  Cluster feedings may occur a few days before the growth spurt begins   Small, frequent feeds at the breast are encouraged    Reviewed early signs of hunger, including tensing of hands and shoulders - no need to wait for open eyes  Crying is a late hunger sign  If baby is crying, soothe baby first and then attempt to latch  Reviewed normal sucking patterns: transition from stimulation to nutritive to release or non-nutritive  The goal is to see and hear lots of swallowing  Reviewed normal nursing pattern: infant could latch on one breast up to 30 minutes or until releases on own  Signs of satiation is open hand with fingers that do not grab your finger  Discussed difference in sensation of non-nutritive v nutritive sucking- Start feedings on breast that last feeding ended   - allow no more than 3 hours between breast feeding sessions   - time between feedings is counted from the beginning of the first feed to the beginning of the next feeding session    Demonstrated with teach back breast compressions during a feeding to increase milk transfer and stimulate suckling after a breathing/muscle break  Enc   To call lactation for additional help

## 2022-01-07 NOTE — PLAN OF CARE
Problem: PAIN - ADULT  Goal: Verbalizes/displays adequate comfort level or baseline comfort level  Description: Interventions:  - Encourage patient to monitor pain and request assistance  - Assess pain using appropriate pain scale  - Administer analgesics based on type and severity of pain and evaluate response  - Implement non-pharmacological measures as appropriate and evaluate response  - Consider cultural and social influences on pain and pain management  - Notify physician/advanced practitioner if interventions unsuccessful or patient reports new pain  Outcome: Progressing     Problem: INFECTION - ADULT  Goal: Absence or prevention of progression during hospitalization  Description: INTERVENTIONS:  - Assess and monitor for signs and symptoms of infection  - Monitor lab/diagnostic results  - Monitor all insertion sites, i e  indwelling lines, tubes, and drains  - Monitor endotracheal if appropriate and nasal secretions for changes in amount and color  - [place] appropriate cooling/warming therapies per order  - Administer medications as ordered  - Instruct and encourage patient and family to use good hand hygiene technique  - Identify and instruct in appropriate isolation precautions for identified infection/condition  Outcome: Progressing  Goal: Absence of fever/infection during neutropenic period  Description: INTERVENTIONS:  - Monitor WBC    Outcome: Progressing     Problem: SAFETY ADULT  Goal: Patient will remain free of falls  Description: INTERVENTIONS:  - Educate patient/family on patient safety including physical limitations  - Instruct patient to call for assistance with activity   - Consult OT/PT to assist with strengthening/mobility   - Keep Call bell within reach  - Keep bed low and locked with side rails adjusted as appropriate  - Keep care items and personal belongings within reach  - Initiate and maintain comfort rounds  - Make Fall Risk Sign visible to staff  - Apply yellow socks and bracelet for high fall risk patients  - Consider moving patient to room near nurses station  Outcome: Progressing  Goal: Maintain or return to baseline ADL function  Description: INTERVENTIONS:  -  Assess patient's ability to carry out ADLs; assess patient's baseline for ADL function and identify physical deficits which impact ability to perform ADLs (bathing, care of mouth/teeth, toileting, grooming, dressing, etc )  - Assess/evaluate cause of self-care deficits   - Assess range of motion  - Assess patient's mobility; develop plan if impaired  - Assess patient's need for assistive devices and provide as appropriate  - Encourage maximum independence but intervene and supervise when necessary  - Involve family in performance of ADLs  - Assess for home care needs following discharge   - Consider OT consult to assist with ADL evaluation and planning for discharge  - Provide patient education as appropriate  Outcome: Progressing  Goal: Maintains/Returns to pre admission functional level  Description: INTERVENTIONS:  - Perform BMAT or MOVE assessment daily    - Set and communicate daily mobility goal to care team and patient/family/caregiver  - Collaborate with rehabilitation services on mobility goals if consulted  - Out of bed for toileting  - Record patient progress and toleration of activity level   Outcome: Progressing     Problem: Knowledge Deficit  Goal: Patient/family/caregiver demonstrates understanding of disease process, treatment plan, medications, and discharge instructions  Description: Complete learning assessment and assess knowledge base    Interventions:  - Provide teaching at level of understanding  - Provide teaching via preferred learning methods  Outcome: Progressing     Problem: DISCHARGE PLANNING  Goal: Discharge to home or other facility with appropriate resources  Description: INTERVENTIONS:  - Identify barriers to discharge w/patient and caregiver  - Arrange for needed discharge resources and transportation as appropriate  - Identify discharge learning needs (meds, wound care, etc )  - Arrange for interpretive services to assist at discharge as needed  - Refer to Case Management Department for coordinating discharge planning if the patient needs post-hospital services based on physician/advanced practitioner order or complex needs related to functional status, cognitive ability, or social support system  Outcome: Progressing     Problem: POSTPARTUM  Goal: Experiences normal postpartum course  Description: INTERVENTIONS:  - Monitor maternal vital signs  - Assess uterine involution and lochia  Outcome: Progressing  Goal: Appropriate maternal -  bonding  Description: INTERVENTIONS:  - Identify family support  - Assess for appropriate maternal/infant bonding   -Encourage maternal/infant bonding opportunities  - Referral to  or  as needed  Outcome: Progressing  Goal: Establishment of infant feeding pattern  Description: INTERVENTIONS:  - Assess breast/bottle feeding  - Refer to lactation as needed  Outcome: Progressing  Goal: Incision(s), wounds(s) or drain site(s) healing without S/S of infection  Description: INTERVENTIONS  - Assess and document dressing, incision, wound bed, drain sites and surrounding tissue  - Provide patient and family education  Outcome: Progressing

## 2022-01-07 NOTE — LACTATION NOTE
This note was copied from a baby's chart  Latch Assessment: 30 mls in SNS at the breast - right in side lying  Education on positioning, alignment, set up SNS and how to know if effective  After 15 mls, moved into football - mom continued on right breast  Active, coordinated swallowing at the breast  Encouraged to allow non-nutritive suck after SNS is empty  MOB requested finger feeding as an option  SNS supplied with instructions on care and duration the device may be used (24 hours)  Showed MOB how to use SNS with proper return demonstration  Provided education on alignment of nose to breast; bring baby to breast and not breast to baby; support head with opp  Hand in cross cradle; use pillows to lift baby to be belly to belly; ear, shoulder, hip alignment; Support mother's back and place self in comfortable position to support bringing baby to the breast  Shoulders should be down and away from ears  Position your baby facing you with your with your nipple at his nose  His ear, shoulder and hip should be in alignment  When he opens wide, move him onto the breast by applying pressure with your hand on his shoulders

## 2022-01-07 NOTE — PLAN OF CARE
Problem: PAIN - ADULT  Goal: Verbalizes/displays adequate comfort level or baseline comfort level  Description: Interventions:  - Encourage patient to monitor pain and request assistance  - Assess pain using appropriate pain scale  - Administer analgesics based on type and severity of pain and evaluate response  - Implement non-pharmacological measures as appropriate and evaluate response  - Consider cultural and social influences on pain and pain management  - Notify physician/advanced practitioner if interventions unsuccessful or patient reports new pain  Outcome: Completed     Problem: INFECTION - ADULT  Goal: Absence or prevention of progression during hospitalization  Description: INTERVENTIONS:  - Assess and monitor for signs and symptoms of infection  - Monitor lab/diagnostic results  - Monitor all insertion sites, i e  indwelling lines, tubes, and drains  - Monitor endotracheal if appropriate and nasal secretions for changes in amount and color  - [place] appropriate cooling/warming therapies per order  - Administer medications as ordered  - Instruct and encourage patient and family to use good hand hygiene technique  - Identify and instruct in appropriate isolation precautions for identified infection/condition  Outcome: Completed  Goal: Absence of fever/infection during neutropenic period  Description: INTERVENTIONS:  - Monitor WBC    Outcome: Completed     Problem: SAFETY ADULT  Goal: Patient will remain free of falls  Description: INTERVENTIONS:  - Educate patient/family on patient safety including physical limitations  - Instruct patient to call for assistance with activity   - Consult OT/PT to assist with strengthening/mobility   - Keep Call bell within reach  - Keep bed low and locked with side rails adjusted as appropriate  - Keep care items and personal belongings within reach  - Initiate and maintain comfort rounds  - Make Fall Risk Sign visible to staff  - Offer Toileting every Hours, in advance of need  - Initiate/Maintain alarm  - Obtain necessary fall risk management equipment:   - Apply yellow socks and bracelet for high fall risk patients  - Consider moving patient to room near nurses station  Outcome: Completed  Goal: Maintain or return to baseline ADL function  Description: INTERVENTIONS:  -  Assess patient's ability to carry out ADLs; assess patient's baseline for ADL function and identify physical deficits which impact ability to perform ADLs (bathing, care of mouth/teeth, toileting, grooming, dressing, etc )  - Assess/evaluate cause of self-care deficits   - Assess range of motion  - Assess patient's mobility; develop plan if impaired  - Assess patient's need for assistive devices and provide as appropriate  - Encourage maximum independence but intervene and supervise when necessary  - Involve family in performance of ADLs  - Assess for home care needs following discharge   - Consider OT consult to assist with ADL evaluation and planning for discharge  - Provide patient education as appropriate  Outcome: Completed  Goal: Maintains/Returns to pre admission functional level  Description: INTERVENTIONS:  - Perform BMAT or MOVE assessment daily    - Set and communicate daily mobility goal to care team and patient/family/caregiver  - Collaborate with rehabilitation services on mobility goals if consulted  - Perform Range of Motion times a day  - Reposition patient every hours    - Dangle patient times a day  - Stand patient times a day  - Ambulate patient times a day  - Out of bed to chair times a day   - Out of bed for meals times a day  - Out of bed for toileting  - Record patient progress and toleration of activity level   Outcome: Completed     Problem: Knowledge Deficit  Goal: Patient/family/caregiver demonstrates understanding of disease process, treatment plan, medications, and discharge instructions  Description: Complete learning assessment and assess knowledge base   Interventions:  - Provide teaching at level of understanding  - Provide teaching via preferred learning methods  Outcome: Completed     Problem: DISCHARGE PLANNING  Goal: Discharge to home or other facility with appropriate resources  Description: INTERVENTIONS:  - Identify barriers to discharge w/patient and caregiver  - Arrange for needed discharge resources and transportation as appropriate  - Identify discharge learning needs (meds, wound care, etc )  - Arrange for interpretive services to assist at discharge as needed  - Refer to Case Management Department for coordinating discharge planning if the patient needs post-hospital services based on physician/advanced practitioner order or complex needs related to functional status, cognitive ability, or social support system  Outcome: Completed     Problem: POSTPARTUM  Goal: Experiences normal postpartum course  Description: INTERVENTIONS:  - Monitor maternal vital signs  - Assess uterine involution and lochia  Outcome: Completed  Goal: Appropriate maternal -  bonding  Description: INTERVENTIONS:  - Identify family support  - Assess for appropriate maternal/infant bonding   -Encourage maternal/infant bonding opportunities  - Referral to  or  as needed  Outcome: Completed  Goal: Establishment of infant feeding pattern  Description: INTERVENTIONS:  - Assess breast/bottle feeding  - Refer to lactation as needed  Outcome: Completed  Goal: Incision(s), wounds(s) or drain site(s) healing without S/S of infection  Description: INTERVENTIONS  - Assess and document dressing, incision, wound bed, drain sites and surrounding tissue  - Provide patient and family education  - Perform skin care/dressing changes every   Outcome: Completed 0 = swallows foods/liquids without difficulty

## 2022-01-07 NOTE — PROGRESS NOTES
Progress Note - OB/GYN   Xin Valles 27 y o  female MRN: [id number]  Unit/Bed#: -01 Encounter: [id number]    Assessment:  PPD#2 s/p Spontaneous Vaginal Delivery, stable    Plan:    1) Postpartum care  - Encourage ambulation  - Encourage breastfeeding  - Continue current meds     2) Contraception   - declines     3) Pain Management   - well controlled      4) Rh positive  - not indicated    5) Gestational Hypertension   - Systolic (35VZD), KTR:206 , Min:126 , CKR:353   - Diastolic (95ICE), VCY:79, Min:65, Max:82  - CBC/CMP wnl   - P:C 0 29    6) Subclinical hypothyroidism   - On levothyroxine 50 mcg     7) Tachycardia   - ECG wnl   - HR in the last 24 hours 90s-120s    8) Disposition  - Anticipate discharge home today    Subjective/Objective     Subjective: Doing well this AM  Reports pain is well controlled  Tolerating PO well  Does not desire birth control on discharge  Desires dc home today  Pain: Yes, well controlled   Tolerating PO: yes  Voiding: yes  Flatus: yes  BM: No  Ambulating: yes  Breastfeeding: Breastfeeding  Chest pain: no  Shortness of breath: no  Leg pain: no  Lochia: wnl    Objective:     Vitals:  Vitals:    01/06/22 1155 01/06/22 1601 01/06/22 2017 01/07/22 0000   BP: 130/82 131/71 134/69 134/65   BP Location:  Right arm Right arm    Pulse: (!) 108 99 97 104   Resp: 18 18 18 18   Temp: 98 6 °F (37 °C) 98 °F (36 7 °C) 97 7 °F (36 5 °C) 99 1 °F (37 3 °C)   TempSrc: Oral Oral Oral Oral   SpO2:   98%    Weight:       Height:           Physical Exam:   GEN: The patient was alert, pleasant well-appearing female in no acute distress     CV: Regular rate  PULM: nonlabored respirations  MSK: Normal gait  Skin: warm, dry  Neuro: no focal deficits  Psych: normal affect and judgement, cooperative  ABDOMEN: soft, mild tenderness, no distention, Uterine fundus firm and non-tender, -2 cm below the umbilicus         Lab Results   Component Value Date    WBC 12 03 (H) 01/04/2022    HGB 12 2 01/04/2022 HCT 36 1 01/04/2022    MCV 90 01/04/2022     01/04/2022         Jyoce Carpenter MD, PGY-1  Obstetrics & Gynecology  01/07/22

## 2022-01-10 DIAGNOSIS — E03.8 SUBCLINICAL HYPOTHYROIDISM: ICD-10-CM

## 2022-01-10 RX ORDER — LEVOTHYROXINE SODIUM 0.05 MG/1
50 TABLET ORAL DAILY
Qty: 30 TABLET | Refills: 3 | Status: SHIPPED | OUTPATIENT
Start: 2022-01-10 | End: 2022-04-05

## 2022-01-10 NOTE — TELEPHONE ENCOUNTER
----- Message from Josselyn Martinez sent at 1/8/2022  9:36 AM EST -----  Regarding: Levothyroxine Rx  Sedrick Crigler! I hope all is well! I was just wondering if I could have a refill of my levothyroxine?  Thank You so much!    -Josselyn Martinez right rail down/B UEs on 1 HR/right rail up

## 2022-01-13 ENCOUNTER — POSTPARTUM VISIT (OUTPATIENT)
Dept: OBGYN CLINIC | Age: 32
End: 2022-01-13

## 2022-01-13 VITALS — SYSTOLIC BLOOD PRESSURE: 128 MMHG | DIASTOLIC BLOOD PRESSURE: 82 MMHG

## 2022-01-13 DIAGNOSIS — Z87.59 HISTORY OF GESTATIONAL HYPERTENSION: Primary | ICD-10-CM

## 2022-01-13 PROBLEM — Z3A.37 37 WEEKS GESTATION OF PREGNANCY: Status: RESOLVED | Noted: 2021-12-09 | Resolved: 2022-01-13

## 2022-01-13 PROBLEM — O99.213 OBESITY COMPLICATING PREGNANCY, THIRD TRIMESTER: Status: RESOLVED | Noted: 2021-12-03 | Resolved: 2022-01-13

## 2022-01-13 PROBLEM — Z34.03 ENCOUNTER FOR SUPERVISION OF NORMAL FIRST PREGNANCY IN THIRD TRIMESTER: Status: RESOLVED | Noted: 2021-12-10 | Resolved: 2022-01-13

## 2022-01-13 PROBLEM — O09.93 PREGNANCY, SUPERVISION, HIGH-RISK, THIRD TRIMESTER: Status: RESOLVED | Noted: 2021-07-19 | Resolved: 2022-01-13

## 2022-01-13 PROBLEM — O13.3 GESTATIONAL HYPERTENSION WITHOUT SIGNIFICANT PROTEINURIA IN THIRD TRIMESTER: Status: RESOLVED | Noted: 2021-12-03 | Resolved: 2022-01-13

## 2022-01-13 PROCEDURE — 99024 POSTOP FOLLOW-UP VISIT: CPT | Performed by: NURSE PRACTITIONER

## 2022-01-13 NOTE — PATIENT INSTRUCTIONS
Self Care After Delivery   AMBULATORY CARE:   The postpartum period  is the period of time from delivery to about 6 weeks  During this time you may experience many physical and emotional changes  It is important to understand what is normal and when you need to call your healthcare provider  It is also important to know how to care for yourself during this time  Call your local emergency number (911 in the [address] [facility] [facility]) for any of the following:   · You see or hear things that are not there, or have thoughts of harming yourself or your baby  · You soak through 1 pad in 15 minutes, have blurry vision, clammy or pale skin, and feel faint  · You faint or lose consciousness  · You have trouble breathing  · You cough up blood  · Your  incision comes apart  Seek care immediately if:   · Your heart is beating faster than usual     · You have a bad headache or changes in your vision  · Your episiotomy or  incision is red, swollen, bleeding, or draining pus  · You have severe abdominal pain  Call your doctor or obstetrician if:   · Your leg is painful, red, and larger than usual     · You soak through 1 or more pads in an hour, or pass blood clots larger than a quarter from your vagina  · You have a fever  · You have new or worsening pain in your abdomen or vagina  · You continue to have depression 1 to 2 weeks after you deliver  · You have trouble sleeping  · You have foul-smelling discharge from your vagina  · You have pain or burning when you urinate  · You do not have a bowel movement for 3 days or more  · You have nausea or are vomiting  · You have hard lumps or red streaks over your breasts  · You have cracked nipples or bleed from your nipples  · You have questions or concerns about your condition or care  Physical changes:   The following are normal changes after you give birth:  · Pain in the area between your anus and vagina    · Breast pain    · Constipation or hemorrhoids    · Hot or cold flashes    · Vaginal bleeding or discharge    · Mild to moderate abdominal cramping    · Difficulty controlling bowel movements or urine    Emotional changes:  A drop in hormone levels after you deliver may cause changes in your emotions  You may feel irritable, sad, or anxious  You may cry easily or for no reason  You may also feel depressed  Depression that continues can be a sign of postpartum depression, a condition that can be treated  Treatment may include talk therapy, medicines, or both  Healthcare providers will ask how you are feeling and if you have any depression  These talks can happen during appointments for your medical care and for your baby's care, such as well child visits  Providers can help you find ways to care for yourself and your baby  Talk to your providers about the following:  · When emotional changes or depression started, and if it is getting worse over time    · Problems you are having with daily activities, sleep, or caring for your baby    · If anything makes you feel worse, or makes you feel better    · Feeling that you are not bonding with your baby the way you want    · Any problems your baby has with sleeping or feeding    · Your baby is fussy or cries a lot    · Support you have from friends, family, or others    Breast care for breastfeeding mothers: You may have sore breasts for 3 to 6 days after you give birth  This happens as your milk begins to fill your breasts  You may also have sore breasts if you do not breastfeed frequently  Do the following to care for your breasts:  · Apply a moist, warm, compress to your breast as directed  This may help soothe your breasts  Make sure the washcloth is not too hot before you apply it to your breast     · Nurse your baby or pump your milk frequently  This may prevent clogged milk ducts  Ask your healthcare provider how often to nurse or pump      · Massage your breasts as directed  This may help increase your milk flow  Gently rub your breasts in a circular motion before you breastfeed  You may need to gently squeeze your breast or nipple to help release milk  You can also use a breast pump to help release milk from your breast     · Wash your breasts with warm water only  Do not put soap on your nipples  Soap may cause your nipples to become dry  · Apply lanolin cream to your nipples as directed  Lanolin cream may add moisture to your skin and prevent nipple dryness  Always  wash off lanolin cream with warm water before you breastfeed  · Place pads in your bra  Your nipples may leak milk when you are not breastfeeding  You can place pads inside of your bra to help prevent leaking onto your clothing  Ask your healthcare provider where to purchase bra pads  · Get breastfeeding support if needed  Healthcare providers can answer questions about breastfeeding and provide you with support  Ask your healthcare provider who you can contact if you need breastfeeding support  Breast care for non-breastfeeding mothers:  Milk will fill your breasts even if you bottle feed your baby  Do the following to help stop your milk from filling your breasts and causing pain:  · Wear a bra with support at all times  A sports bra or a tight-fitting bra will help stop your milk from coming in  · Apply ice on each breast for 15 to 20 minutes every hour or as directed  Use an ice pack, or put crushed ice in a plastic bag  Cover it with a towel before you apply it to your breast  Ice helps your milk ducts shrink  · Keep your breasts away from warm water  Warm water will make it easier for milk to fill your breasts  Stand with your breasts away from warm water in the shower  · Limit how much you touch your breasts  This will prevent them from filling with milk  Perineum care: Your perineum is the area between your rectum and vagina   It is normal to have swelling and pain in this area after you give birth  If you had an episiotomy, your healthcare provider may give you special instructions  · Clean your perineum after you use the bathroom  This may prevent infection and help with healing  Use a spray bottle with warm water to clean your perineum  You may also gently spray warm water against your perineum when you urinate  Always wipe front to back  · Take a sitz bath as directed  A sitz bath may help relieve swelling and pain  Fill your bath tub or bucket with water up to your hips and sit in the water  Use cold water for 2 days after you deliver  Then use warm water  Ask your healthcare provider for more information about a sitz bath  · Apply ice packs for the first 24 hours or as directed  Use a plastic glove filled with ice or buy an ice pack  Wrap the ice pack or plastic glove in a small towel or wash cloth  Place the ice pack on your perineum for 20 minutes at a time  · Sit on a donut-shaped pillow  This may relieve pressure on your perineum when you sit  · Use wipes that contain medicine or take pills as directed  Your healthcare provider may tell you to use witch hazel pads  You can place witch hazel pads in the refrigerator before you apply them to your perineum  Your provider may also tell you to take NSAIDs  Ask him or her how often to take pills or use the wipes  · Do not go swimming or take tub baths for 4 to 6 weeks or as directed  This will help prevent an infection in your vagina or uterus  Bowel and bladder care: It may take 3 to 5 days to have a bowel movement after you deliver your baby  You can do the following to prevent or manage constipation, and get control of your bowel or bladder:  · Take stool softeners as directed  A stool softener is medicine that will make your bowel movements softer  This may prevent or relieve constipation  A stool softener may also make bowel movements less painful  · Drink plenty of liquids    Ask how much liquid to drink each day and which liquids are best for you  Liquids may help prevent constipation  · Eat foods high in fiber  Examples include fruits, vegetables, grains, beans, and lentils  Ask your healthcare provider how much fiber you need each day  Fiber may prevent constipation  · Do Kegel exercises as directed  Kegel exercises will help strengthen the muscles that control bowel movements and urination  Ask your healthcare provider for more information on Kegel exercises  · Apply cold compresses or medicine to hemorrhoids as directed  This may relieve swelling and pain  Your healthcare provider may tell you to apply ice or wipes that contain medicine to your hemorrhoids  He or she may also tell you to use a sitz bath  Ask your provider for more information on how to manage hemorrhoids  Nutrition:  Good nutrition is important in the postpartum period  It will help you return to a healthy weight, increase your energy levels, and prevent constipation  It will also help you get enough nutrients and calories if you are going to breastfeed your baby  · Eat a variety of healthy foods  Healthy foods include fruits, vegetables, whole-grain breads, low-fat dairy products, beans, lean meats, and fish  You may need 500 to 700 extra calories each day if you breastfeed your baby  You may also need extra protein  · Limit foods with added sugar and high amounts of fat  These foods are high in calories and low in healthy nutrients  Read food labels so you know how much sugar and fat is in the food you want to eat  · Drink 8 to 10 glasses of water per day  Water will help you make plenty of milk for your baby  It will also help prevent constipation  Drink a glass of water every time you breastfeed your baby  · Take vitamins as directed  Ask your healthcare provider what vitamins you need  · Limit caffeine and alcohol if you are breastfeeding    Caffeine and alcohol can get into your breast milk  Caffeine and alcohol can make your baby fussy  They can also interfere with your baby's sleep  Ask your healthcare provider if you can drink alcohol or caffeine  Rest and sleep: You may feel very tired in the postpartum period  Enough sleep will help you heal and give you energy to care for your baby  The following may help you get sleep and rest:  · Nap when your baby naps  Your baby may nap several times during the day  Get rest during this time  · Limit visitors  Many people may want to see you and your baby  Ask friends or family to visit on different days  This will give you time to rest     · Do not plan too much for one day  Put off household chores so that you have time to rest  Gradually do more each day  · Ask for help from family, friends, or neighbors  Ask them to help you with laundry, cleaning, or errands  Also ask someone to watch the baby while you take a nap or relax  Ask your partner to help with the care of your baby  Pump some of your breast milk so your partner can feed your baby during the night  Exercise after delivery:  Wait until your healthcare provider says it is okay to exercise  Exercise can help you lose weight, increase your energy levels, and manage your mood  It can also prevent constipation and blood clots  Start with gentle exercises such as walking  Do more as you have more energy  You may need to avoid abdominal exercises for 1 to 2 weeks after you deliver  Talk to your healthcare provider about an exercise plan that is right for you  Sexual activity after delivery:   · Do not have sex until your healthcare provider says it is okay  You may need to wait 4 to 6 weeks before you have sex  This may prevent infection and allow time to heal     · Your menstrual cycle may begin as soon as 3 weeks after you deliver  Your period may be delayed if you breastfeed your baby  You can become pregnant before you get your first postpartum period   Talk to your healthcare provider about birth control that is right for you  Some types of birth control are not safe during breastfeeding  For support and more information:  Join a support group for new mothers  Ask for help from family and friends with chores, errands, and care of your baby  · Office of Women's Health,  Department of Health and Human Services  [address], [address] [facility]  [address], [address] [facility] [place] [address]  Phone: 0- 476 - [phone]  Web Address: www womenshealth gov    · [facility] Postpartum [address] [facility] , [address]  [address] [facility] , [address] [facility]  Web Address: [facility] be  [facility]/pregnancy/postpartum-care  aspx    Follow up with your doctor or obstetrician as directed: You will need to follow up within 2 to 6 weeks of delivery  Write down your questions so you remember to ask them at your visits  © Copyright "[employer] 2021 Information is for End User's use only and may not be sold, redistributed or otherwise used for commercial purposes  All illustrations and images included in CareNotes® are the copyrighted property of [employer]  or [address] [place], [place] [address]pawan   The above information is an  only  It is not intended as medical advice for individual conditions or treatments  Talk to your doctor, nurse or pharmacist before following any medical regimen to see if it is safe and effective for you

## 2022-01-21 NOTE — PROGRESS NOTES
Assessment/Plan:    Postpartum exam  Normal postpartum exam  Lochia Alba noted/scant  No CMT or S&S of infection The patient may advance activity as tolerated and may resume sexual activity after 6 week PP  She expresses no interest in starting   [facility] at this time  Pt advised of risks of short interval preg and that BF is not a reliable method of BC, pt understands  Will use condoms at this time  Reviewed Kegel's, rest, daily exercise and nutrition recommendations  Breast feeding is going well  No breast complaints today  Continue with PNV  RTO for routine annual gyn exam in 3-6 mos  Gardasil: Completed / Not completed,  Gardasil discussed pt aware of benefits/ risks  Aware she can start the series today or call for appt anytime, declines today   The patient agrees with plan  Diagnoses and all orders for this visit:    Postpartum exam    HPV vaccine counseling          Subjective:      Patient ID: Dajuan Hernandez is a 34 y o  female  Dajuan Hernandez is a 34 y o  Volodymyr Bradley presents for routine postpartum visit  She is s/p  @ 37W,2nd degree lac with repair  On 22, female Apgar's 9/9    Prenatal and intrapartum course was uncomplicated by Patient Active Problem List:     PCOS (polycystic ovarian syndrome)     Subclinical hypothyroidism     Obesity   GHTN  Since d/c home she has had no complaints  She denies abn bleeding, pelvic pain, breast complaints, bowel/bladder dysfunction, depression/anx  Baby is thriving  formula feeding mostly, pumps occasionally and gives BM in bottle,  EPDS score 5, Good support at home, adjusting well   Not Interested contraception   Denies resumption of sexual activity at this time       The following portions of the patient's history were reviewed and updated as appropriate: allergies, current medications, past family history, past medical history, past social history, past surgical history and problem list     Review of Systems   Constitutional: Negative for chills, fatigue and fever  Eyes: Negative for visual disturbance  Respiratory: Negative for cough and shortness of breath  Cardiovascular: Negative for chest pain  Gastrointestinal: Negative for abdominal pain  Genitourinary: Positive for vaginal bleeding (scant lochia)  Negative for vaginal discharge  Objective:      /80 (BP Location: Left arm, Patient Position: Sitting, Cuff Size: Large)   Ht 5' 6" (1 676 m)   Wt 108 kg (239 lb)   Breastfeeding Yes   BMI 38 58 kg/m²          Physical Exam  Vitals and nursing note reviewed  Constitutional:       Appearance: Normal appearance  She is normal weight  HENT:      Head: Normocephalic and atraumatic  Eyes:      Conjunctiva/sclera: Conjunctivae normal    Cardiovascular:      Rate and Rhythm: Normal rate  Pulmonary:      Effort: Pulmonary effort is normal    Genitourinary:     General: Normal vulva  Exam position: Lithotomy position  Earle stage (genital): 5  Labia:         Right: No rash, tenderness, lesion or injury  Left: No rash, tenderness, lesion or injury  Vagina: Signs of injury present  Cervix: Normal       Uterus: Normal        Adnexa: Right adnexa normal and left adnexa normal       Comments: 2nd degree lac healing well   Musculoskeletal:         General: Normal range of motion  Cervical back: Normal range of motion  Lymphadenopathy:      Lower Body: No right inguinal adenopathy  No left inguinal adenopathy  Skin:     General: Skin is warm and dry  Neurological:      Mental Status: She is alert  Psychiatric:         Mood and Affect: Mood normal          Behavior: Behavior normal          Thought Content:  Thought content normal          Judgment: Judgment normal        f/U in 3 months for annual or PRN   See AVS for information

## 2022-01-21 NOTE — PATIENT INSTRUCTIONS
Mastitis   AMBULATORY CARE:   Mastitis  is an infection of breast tissue that most often occurs in women who breastfeed  It can happen any time during breastfeeding, but usually occurs within the first 3 months after giving birth  Usually only one breast is affected  Common signs and symptoms include the following:   · Chills and fever    · Breast swelling, redness, warmth, and tenderness     · Tenderness under your arm    · Fatigue and body aches    Contact your healthcare provider if:   · Your symptoms do not get better within 2 days  · You have a painful lump in your breast      · You have swollen and tender lymph nodes in your armpit on the same side as the affected breast     · You have questions or concerns about your condition or care  Treatment:  You can continue to breastfeed your baby while you are being treated for mastitis  Breastfeeding when you have mastitis may help speed your recovery  You may need any of the following:  · Antibiotics  help treat or prevent a bacterial infection  · Acetaminophen  decreases pain and fever  It is available without a doctor's order  Ask how much to take and how often to take it  Follow directions  Acetaminophen can cause liver damage if not taken correctly  · NSAIDs , such as ibuprofen, help decrease swelling, pain, and fever  This medicine is available with or without a doctor's order  NSAIDs can cause stomach bleeding or kidney problems in certain people  If you take blood thinner medicine, always ask your healthcare provider if NSAIDs are safe for you  Always read the medicine label and follow directions  · Incision and drainage  may be needed if the swelling does not go away and an abscess forms  Your healthcare provider will make a small incision in your breast to drain the pus  Manage your symptoms:   · Continue to breastfeed from the affected breast   This will help to prevent an abscess from forming   Breastfeed your baby on the affected side first  Apply a warm, wet cloth on your breast or take a warm shower before you feed your baby  This can help increase your milk flow  If it is painful when you breastfeed from the affected breast, feed your baby from the other breast first  Pump the affected side to completely drain your breast after breastfeeding, if needed  You may save the pumped milk to feed your baby  · Use different positions to breastfeed  Change the position of your baby during feedings  This may help to relieve your discomfort  · Apply heat on your breast for 20 to 30 minutes every 2 hours for as many days as directed  Heat helps decrease pain  · Apply ice after feedings  Apply ice on your breast for 15 to 20 minutes every hour or as directed  Use an ice pack, or put crushed ice in a plastic bag  Cover it with a towel  Ice helps prevent tissue damage and decreases swelling and pain  · Massage your breast   Gently massage your breast before and during breastfeeding to help drain your milk  · Drink liquids as directed  Ask how much liquid to drink each day and which liquids are best for you  · Rest as needed  Do not sleep on your stomach until your infection is gone  Prevent mastitis:   · Breastfeed every 2 or 3 hours to prevent engorgement  Breast engorgement develops when too much milk builds up in your breast  Take your time when you breastfeed to allow your baby to empty your breast  Try not to switch breasts too early  Express or pump after you breastfeed if your baby is not emptying your breasts when he or she feeds  · Prevent sore and cracked nipples  A good latch prevents sore and cracked nipples  If you have sore nipples after breastfeeding, your baby may not be latched on properly  Gently break suction and reposition if your baby is only sucking on the nipple  Talk to a lactation consultant if you need help with your baby's latch  · Care for your breasts    Keep your nipples clean and dry between feedings  Check them for cracks, blisters, or other irritated areas  Ask a lactation specialist or your healthcare provider how to treat sore and cracked nipples  Wash your hands before and after you breastfeed your baby or pump your breasts  Wear a comfortable nursing bra that supports your breasts but is not too tight  Follow up with your doctor as directed:  Write down your questions so you remember to ask them during your visits  © Copyright [employer] 2021 Information is for End User's use only and may not be sold, redistributed or otherwise used for commercial purposes  All illustrations and images included in CareNotes® are the copyrighted property of A D A M , Inc  or [employer] Yaneli Job36pawan   The above information is an  only  It is not intended as medical advice for individual conditions or treatments  Talk to your doctor, nurse or pharmacist before following any medical regimen to see if it is safe and effective for you  Breastfeeding and Breast Engorgement   AMBULATORY CARE:   What you need to know about breast engorgement:  Breast engorgement develops when too much milk builds up in your breast  It is normal for your breasts to feel swollen, heavy, and tender when your milk comes in  This is called breast fullness  When your breast starts to feel painful and hard, the fullness has developed into engorgement  Breast engorgement usually happens 3 to 5 days after you give birth  Engorgement can happen if you are not breastfeeding or expressing milk often, or produce a lot of milk  Your baby may have a hard time latching on (attaching) to your breast to feed  Without treatment, engorgement can lead to plugged milk ducts or a breast infection called mastitis     Common symptoms include the following:   · A swollen, tender breast    · A breast that feels hard to the touch or looks tight or shiny    · Warm, red, or throbbing breast    · Flat nipple    · A low fever    Seek care immediately if:   · You have a fever with chills or body aches  · You have pain and swelling in one or both breasts that keeps you from breastfeeding  Contact your healthcare provider if:   · You have a tender breast lump that grows slowly and usually forms on one side of your breast     · You have a small, white bump on your nipple  · Your symptoms do not get better within 24 hours  · You have questions or concerns about your condition or care  Manage your symptoms:   · Breastfeed or pump every 2 or 3 hours  Frequent breastfeeding helps decrease engorgement discomfort  Express or pump milk from your breasts before you breastfeed  This will help soften your breast and your nipple, and allow your baby to latch on better  · Massage your breast   Breast massage helps empty your engorged breast and decrease pain  Gently massage your breast before and during breastfeeding to help increase your milk flow  Gently stroke your breast, starting from the outer areas and working your way toward the nipple  Breast massage may also help prevent breast engorgement if done in the first few days after you give birth  · Apply a cool compress in between feedings  The cold may help decrease swelling and pain in your engorged breast  Wet a washcloth in cold water, wring it out, and place it on your breast  Ask how long and how often to use a cool compress  · Wear a supportive bra  The bra should fit well but not be too tight  Prevent breast engorgement:   · Help your baby get a good latch  Hold the nape of his or her neck to help him or her latch onto your breast  Touch his or her top lip with your nipple and wait for him or her to open his or her mouth wide  Your baby's lower lip and chin should touch the areola (dark area around the nipple) first  Help him or her get as much of the areola in his or her mouth as possible  You should feel as if your baby will not separate from your breast easily   Gently break suction and reposition if your baby is only sucking on the nipple  Talk to a lactation consultant if you need help with your baby's latch  · Empty your breasts completely  Take your time when you breastfeed to allow your baby to empty your breast  Try not to switch breasts too early  Express or pump after you breastfeed if your baby is not emptying your breasts when he or she feeds  · Apply warmth to your breast before you breastfeed  Put a warm, wet cloth on your breast or take a warm shower  This can help increase your milk flow  Follow up with your doctor as directed:  Write down your questions so you remember to ask them during your visits  For more information:   · [employer] [address]  [place] , [address]  Phone: [phone]  Web Address: http://[employer]/    · [facility]  [address] [facility] [place]  Phone: [phone]  Phone: [phone]  Web Address: http://[facility]/  [facility] Guadalupe  Information is for End User's use only and may not be sold, redistributed or otherwise used for commercial purposes  All illustrations and images included in CareNotes® are the copyrighted property of [employer]  or [address] [place] [address]  The above information is an  only  It is not intended as medical advice for individual conditions or treatments  Talk to your doctor, nurse or pharmacist before following any medical regimen to see if it is safe and effective for you  Postpartum Bleeding   AMBULATORY CARE:   What you need to know about postpartum bleeding:  Postpartum bleeding is vaginal bleeding after childbirth  This bleeding is normal, whether your baby was born vaginally or by   It contains blood and the tissue that lined the inside of your uterus when you were pregnant     What to expect with postpartum bleeding:  Postpartum bleeding usually lasts at least 10 days, and may last longer than 6 weeks  Your bleeding may range from light (barely staining a pad) to heavy (soaking a pad in 1 hour)  Usually, you have heavier bleeding right after childbirth, which slows over the next few weeks until it stops  The bleeding is red or dark brown with clots for the first 1 to 3 days  It then turns pink for several days, and then becomes a white or yellow discharge until it ends  Call your local emergency number (911 in the [address]) if:   · You are suddenly short of breath and feel lightheaded  · You have sudden chest pain  · You are breathing faster than normal     · Your heart is beating faster than normal     Call your doctor or obstetrician if:   · Your bleeding increases, or you have heavy bleeding that soaks a pad in 1 hour for 2 hours in a row  · You have a fever  · You pass large blood clots  · You feel dizzy  · You have a low back ache, abdominal pain or tenderness, or loss of appetite  · You are urinating less than usual, or not at all  · You have questions or concerns about your condition or care  What to expect with postpartum bleeding:  Postpartum bleeding usually lasts at least 10 days, and may last longer than 6 weeks  Your bleeding may range from light (barely staining a pad) to heavy (soaking a pad in 1 hour)  Usually, you have heavier bleeding right after childbirth, which slows over the next few weeks until it stops  The bleeding is red or dark brown with clots for the first 1 to 3 days  It then turns pink for several days, and then becomes a white or yellow discharge until it ends  Follow up with your obstetrician as directed:  Do not have sex until your obstetrician says it is okay  Write down your questions so you remember to ask them during your visits  © Copyright [employer] 2021 Information is for End User's use only and may not be sold, redistributed or otherwise used for commercial purposes   All illustrations and images included in [place]rell are the copyrighted property of A D A M , Inc  or [facility] Yaneli Sage   The above information is an  only  It is not intended as medical advice for individual conditions or treatments  Talk to your doctor, nurse or pharmacist before following any medical regimen to see if it is safe and effective for you  Vaginal Delivery   AMBULATORY CARE:   What you need to know about vaginal delivery:  A vaginal delivery occurs when your baby is born through your vagina (birth canal)  How to prepare for vaginal delivery:   · You can ask someone to be with you during labor and delivery  The person can be a spouse, friend, or family member  This person can help make you more comfortable  Arrange to be able to contact the person when labor begins  · You may need tests to check for certain infections that can be passed to your baby  You may be given antibiotics to fight a bacterial infection you have or prevent an infection during delivery  · Ask if it is okay to eat while you are in labor  · Your healthcare provider can give you medicines for pain relief if you choose to have them  You may need medicine to induce (start) your labor if your labor is not moving forward  You may need to move in bed, stand, or walk to help your baby move into position for birth  What will happen during vaginal delivery:   · You can move into several positions during delivery  You can lie on your back, have your feet up in stirrups, or squat  You may feel pressure on your rectum  This pressure is caused by the movement of your baby's head down the birth canal  You may feel the urge to push  Your healthcare provider will have you push when you feel the urge  He or she will guide your baby out of the birth canal  Forceps or suction may be used to help deliver your baby  You may also need an episiotomy (incision) to make the vaginal opening larger  This will make more room for your baby      · At least 1 minute after your baby is born, your healthcare provider will put clamps on the umbilical cord  The cord will then be cut  Your uterus will continue to contract after delivery to push out the placenta  You may be given medicine to prevent heavy bleeding when the placenta is pushed out  Your healthcare provider may close your episiotomy incision or any tears with stitches  What will happen after vaginal delivery:   · Healthcare providers will examine your baby  Your baby may be placed on your chest right away  He or she may also start breastfeeding  Your baby will also be given vitamin K as a shot  · Healthcare providers will examine you  Your blood pressure will be checked right after you give birth  Providers will check for vaginal bleeding, and check that your uterus is shannan  Your temperature and heart rate will be checked regularly  · You may be taken to another room to rest with your baby  Call for a healthcare provider if you are holding your baby and start to feel tired  The provider can put him or her in a bassinet near you while you rest or sleep  This will help prevent an accidental drop or fall of your baby  · A healthcare provider may massage your abdomen several times to make your uterus firm  This can be uncomfortable  You may have abdominal pains for up to 3 days after you give birth because your uterus is still shannan  The contractions help release blood from inside your uterus so it shrinks back to its normal size  These contractions may hurt more while you breastfeed your baby  · Your healthcare provider may suggest you get out of bed to sit in a chair or walk  Activity can help prevent blood clots  · You may be able to go home within 24 to 48 hours after delivery  If you need support at home, ask your healthcare provider about home visits by another healthcare provider   This healthcare provider can help you learn about breastfeeding, bottle feeding, baby care, and perineum care  Call your doctor or obstetrician if:   · Your leg feels warm, tender, and painful  It may look swollen and red  · You have a fever  · You are urinating very little, or not at all  · You have heavy vaginal bleeding that fills 1 or more sanitary pads in 1 hour  · You feel weak, dizzy, or faint  · Your abdominal or perineal pain does not go away, or gets worse  · You feel depressed  · You have questions or concerns about your condition or care  Medicines:   · NSAIDs , such as ibuprofen, help decrease swelling, pain, and fever  This medicine is available with or without a doctor's order  NSAIDs can cause stomach bleeding or kidney problems in certain people  If you take blood thinner medicine, always ask your healthcare provider if NSAIDs are safe for you  Always read the medicine label and follow directions  · Stool softeners  make it easier for you to have a bowel movement  You may need this medicine to treat or prevent constipation  · Take your medicine as directed  Contact your healthcare provider if you think your medicine is not helping or if you have side effects  Tell him or her if you are allergic to any medicine  Keep a list of the medicines, vitamins, and herbs you take  Include the amounts, and when and why you take them  Bring the list or the pill bottles to follow-up visits  Carry your medicine list with you in case of an emergency  Activity:  Rest as much as possible  Try to keep all activities short  You may be able to do some exercise soon after you have your baby  Talk with your healthcare provider before you start exercising  If you work outside the home, ask when you can return to your job  Kegel exercises:  Kegel exercises may help your vaginal and rectal muscles heal faster  You can do Kegel exercises by tightening and relaxing the muscles around your vagina  Kegel exercises help make the muscles stronger     Breast care:  When your milk comes in, your breasts may feel full and hard  Ask how to care for your breasts, even if you are not breastfeeding  Constipation:  You may have constipation for a period of time after you have your baby  Do not try to push the bowel movement out if it is too hard  High-fiber foods and extra liquids can help you prevent constipation  Examples of high-fiber foods are fruit and bran  Prune juice and water are good liquids to drink  You may also be told to take over-the-counter fiber and stool softener medicines  Take these items as directed  Ask how to prevent or treat hemorrhoids  Perineum care: Your perineum is the area between your vagina and anus  Keep the area clean and dry  This will help it heal and prevent infection  Wash the area gently with soap and water when you bathe or shower  Rinse your perineum with warm water after you urinate or have a bowel movement  A warm sitz bath can help decrease pain  To take a sitz bath, fill a bathtub with 4 to 6 inches of warm water  You may also use a sitz bath pan that fits inside the toilet  Sit in the sitz bath for 20 minutes  Do this 2 to 3 times a day, or as directed  The warm water can help decrease pain and swelling  Vaginal discharge: You will have vaginal discharge, called lochia, after your delivery  The lochia is red or dark brown with clots for 1 to 3 days after the birth  The amount will decrease and turn pale pink or brown for 3 to 10 days  It will turn white or yellow on the 10th or 14th day  Use a sanitary pad instead of a tampon to prevent a vaginal infection  You will have lochia for up to 3 weeks after your baby is born  Monthly periods: Your period may start again within 7 to 9 weeks after your baby is born  If you are breastfeeding, it may take longer for your period to start again  You can still get pregnant again even though you do not have your monthly period   Talk with your healthcare provider about a birth control method if you do not want to get pregnant  Mood changes: Many new mothers have some kind of mood changes after delivery  Some of these changes occur because of lack of sleep, hormone changes, and caring for a new baby  Some mood changes can be more serious, such as postpartum depression  Talk with your healthcare provider if you feel unable to care for yourself or your baby  Sexual activity:  Do not have sex until your healthcare provider says it is okay  You may notice you have a decreased desire for sex, or sex may be painful  You may need to use a vaginal lubricant (gel) to help make sex more comfortable  Follow up with your doctor or obstetrician as directed:  Most women need to return 6 weeks after a vaginal delivery  Ask how to care for any wounds or stitches  Write down your questions so you remember to ask them during your visits  © Copyright [employer] 2021 Information is for End User's use only and may not be sold, redistributed or otherwise used for commercial purposes  All illustrations and images included in CareNotes® are the copyrighted property of A D A M , Inc  or [facility] Yaneli Cazares   The above information is an  only  It is not intended as medical advice for individual conditions or treatments  Talk to your doctor, nurse or pharmacist before following any medical regimen to see if it is safe and effective for you  It is recommend that you consider family planning after the birth of your child to decrease risks associated with short interval pregnancies (less than 6-18 months apart)  We highly encourage choosing an effective method of birth control at your post partum visit or shortly after  If you have not chosen a method or decide to utilize natural family planning, we recommended the use of condoms with all sexual activity     Some risks associated with short interval pregnancies are as follows: Miscarriage, stillbirth, Premature birth, Low birth weight, Uterine rupture at [facility], Pre-eclampsia, and Increased maternal morbidity  This information is provided to you solely for educational purposes and to help you to make an informed decision in regards to you future goals  Family planning is a personal decision that will be respected by all of our staff members  We are happy to answer any of your questions that you may have

## 2022-01-25 ENCOUNTER — POSTPARTUM VISIT (OUTPATIENT)
Dept: OBGYN CLINIC | Facility: CLINIC | Age: 32
End: 2022-01-25

## 2022-01-25 VITALS
HEIGHT: 66 IN | BODY MASS INDEX: 38.41 KG/M2 | SYSTOLIC BLOOD PRESSURE: 120 MMHG | DIASTOLIC BLOOD PRESSURE: 80 MMHG | WEIGHT: 239 LBS

## 2022-01-25 DIAGNOSIS — Z71.85 HPV VACCINE COUNSELING: ICD-10-CM

## 2022-01-25 PROCEDURE — 99024 POSTOP FOLLOW-UP VISIT: CPT | Performed by: OBSTETRICS & GYNECOLOGY

## 2022-01-25 NOTE — ASSESSMENT & PLAN NOTE
Normal postpartum exam  Lochia Juana noted/scant  No CMT or S&S of infection The patient may advance activity as tolerated and may resume sexual activity after 6 week PP  She expresses no interest in starting   [facility] at this time  Pt advised of risks of short interval preg and that BF is not a reliable method of BC, pt understands  Will use condoms at this time  Reviewed Kegel's, rest, daily exercise and nutrition recommendations  Breast feeding is going well  No breast complaints today  Continue with PNV  RTO for routine annual gyn exam in 3-6 mos  Gardasil: Completed / Not completed,  Gardasil discussed pt aware of benefits/ risks  Aware she can start the series today or call for appt anytime, declines today   The patient agrees with plan

## 2022-03-21 ENCOUNTER — TELEPHONE (OUTPATIENT)
Dept: OBGYN CLINIC | Facility: CLINIC | Age: 32
End: 2022-03-21

## 2022-03-21 NOTE — TELEPHONE ENCOUNTER
Returned pts' p c - pt states she had gestational HTN during her pregnancy, & was  Having biweekly NST & fluid checks  She continues to have Postpartum edema- more on her left extremity than right   Her most recent BP was 125/78  (taken one month ago)   Recommended pt schedule an appt for evaluation  Pt to scheduled appt for OV

## 2022-03-21 NOTE — TELEPHONE ENCOUNTER
----- Message from Quilla Matters sent at 3/19/2022 11:44 PM EDT -----  Regarding: Postpartum Edema  Hello! I hope this message finds you well  I had a pretty significant amount of swelling to my lower extremities during pregnancy, and while the swelling has decreased a bit, it has not gone away completely  Recently, I have noticed my left calf, ankle & foot getting more swollen then the right  I have an appointment with you on April 26th and I was going to try to hold off until then with my concerns, but the swelling has not gone away in either extremity, and is seemingly getting worse in the left leg, so I wanted to reach out to you and get your opinion  I have no pain or redness in the left extremity   Thank You for your time!    -Quilla Matters

## 2022-04-04 DIAGNOSIS — E03.8 SUBCLINICAL HYPOTHYROIDISM: ICD-10-CM

## 2022-04-05 RX ORDER — LEVOTHYROXINE SODIUM 0.05 MG/1
TABLET ORAL
Qty: 90 TABLET | Refills: 1 | Status: SHIPPED | OUTPATIENT
Start: 2022-04-05

## 2022-04-22 NOTE — PROGRESS NOTES
Diagnoses and all orders for this visit:    Encounter for gynecological examination without abnormal finding    Amenorrhea  -     POCT urine HCG    Encounter for supervision of normal first pregnancy in first trimester  -     Ambulatory Referral to Maternal Fetal Medicine; Future    Obesity in pregnancy  -     Ambulatory referral to Nutrition Services; Future        Health Maintenance:    Last PAP: 2021 Neg/Neg   Next PAP Due:     Last Mammogram: Not on file  advised age 35  Next Mammogram:     Last Colonoscopy: Not on file    advised age 38    Gardisil: Not completed  Will have to wait until after pregnancy   Gardasil 9 was advised for prevention of HPV-related disease  We discussed risks/benefits, SE's/AE's at length and all questions were answered  Written info was provided for review  The patient agrees to consider and is aware she may call to schedule injection #1 at any time  Subjective    CC: Yearly Exam      Jake Fan is a 34 y o  female here for an annual exam    GYN hx includes: PCOS, No personal Hx of breast, cervical, ovarian or colon CA  Family hx of: MA, cousin with BC recently diagnosed   Medically stable, reports no changes in medical Hx, follows with PMD    Her menstrual cycles have not returned since birth of daughter 22/  she stopped breast feeding 1 month ago, did not get a cycle, took HPT yesterday which was positive  She denies issues with bleeding or her menses historically   Denies history of abnormal pap smear  She denies breast concerns, abnormal vaginal discharge, vaginal itching, odor, irritation, bowel/bladder dysfunction, urinary symptoms, pelvic pain, or dyspareunia today  She is sexually active  Monogamous relationship  Her current method of contraception includes condoms  Denies any issues with her BCM  Chris Sharpangie She does want STD testing today   GC & CT prenatal protocol   Denies intimate partner violence    Past Medical History:   Diagnosis Date  Gestational hypertension     Hypothyroidism     dx 1/2021    PCOS (polycystic ovarian syndrome)      History reviewed  No pertinent surgical history      Immunization History   Administered Date(s) Administered    COVID-19 MODERNA VACC 0 5 ML IM 12/23/2020, 01/20/2021, 12/23/2021    Influenza Injectable, MDCK, Preservative Free, Quadrivalent, 0 5 mL 12/14/2018    Influenza Quadrivalent Preservative Free 3 years and older IM 10/28/2020    Influenza, injectable, quadrivalent, preservative free 0 5 mL 11/05/2021    Tdap 11/05/2021       Family History   Problem Relation Age of Onset    Hypothyroidism Mother     Hashimoto's thyroiditis Father     No Known Problems Brother     Aortic aneurysm Maternal Grandmother     Colon cancer Maternal Grandfather     Immunodeficiency Paternal Grandmother         wegeners dx(rare) autoimmune    Heart disease Paternal Grandfather     No Known Problems Brother     No Known Problems Brother     Ovarian cancer Neg Hx     Breast cancer Neg Hx     Cervical cancer Neg Hx     Uterine cancer Neg Hx      Social History     Tobacco Use    Smoking status: Former Smoker     Packs/day: 0 50     Years: 7 00     Pack years: 3 50     Quit date: 2019     Years since quitting: 3 3    Smokeless tobacco: Never Used   Vaping Use    Vaping Use: Never used   Substance Use Topics    Alcohol use: Not Currently    Drug use: Never       Current Outpatient Medications:     Cholecalciferol (Vitamin D3) 50 MCG (2000 UT) TABS, Take 2,000 Units by mouth daily  , Disp: , Rfl:     levothyroxine 50 mcg tablet, TAKE 1 TABLET BY MOUTH EVERY DAY, Disp: 90 tablet, Rfl: 1    Prenatal Vit-Fe Fumarate-FA (PRENATAL VITAMIN AND MINERAL PO), , Disp: , Rfl:   Patient Active Problem List    Diagnosis Date Noted    Postpartum exam 01/25/2022    Subclinical hypothyroidism 07/19/2021    PCOS (polycystic ovarian syndrome) 04/25/2019       Allergies   Allergen Reactions    Co Q10 [Ubidecarenone] Hives OB History    Para Term  AB Living   2 1 1 0 1 1   SAB IAB Ectopic Multiple Live Births   1 0 0 0 1      # Outcome Date GA Lbr Thomas/2nd Weight Sex Delivery Anes PTL Lv   2 Term 22 37w1d / 00:40 2940 g (6 lb 7 7 oz) F Vag-Spont EPI  TISH   1 SAB 2021 5w0d              Vitals:    22 1559   BP: 120/70   BP Location: Left arm   Patient Position: Sitting   Cuff Size: Standard   Weight: 112 kg (246 lb)   Height: 5' 6" (1 676 m)     Body mass index is 39 71 kg/m²  Review of Systems     Constitutional: Negative for chills, fatigue, fever, headaches, visual disturbances, and unexpected weight change  Respiratory: Negative for cough, & shortness of breath  Cardiovascular: Negative for chest pain       Gastrointestinal: Negative for Abd pain, nausea & vomiting, constipation and diarrhea  Genitourinary: Negative for difficulty urinating, dysuria, hematuria, dyspareunia, unusual vaginal bleeding or discharge  Skin: Negative skin changes    Physical Exam     Constitutional: Alert & Oriented x3, well-developed and well-nourished  No distress  HENT: Atraumatic, Normocephalic, Conjunctivae clear  Neck: Normal range of motion  Neck supple  No thyromegaly, mass, nodules or tenderness  Pulmonary: Effort normal  Lungs clear to ascultation bilateral  Cardiac: RRR, no murmur   Abdominal: Soft  No tenderness or masses  Musculoskeletal: Normal ROM  Skin: Warm & Dry  Psychological: Normal mood, thought content, behavior & judgement     Breasts:   Right: tissue soft without masses, tenderness, skin changes or nipple discharge  No areas of erythema or pain  No subclavicular, axillary, pectoral adenopathy  Left:  tissue soft without masses, tenderness, skin changes or nipple discharge  No areas of erythema or pain  No subclavicular, axillary, pectoral adenopathy    Pelvic exam was performed with patient supine, lithotomy position        Labia: Negative rash, tenderness, lesion or injury on the right labia               Negative rash, tenderness, lesion or injury on the left labia  Urethral meatus:  Negative for  tenderness, inflammation or discharge  Uterus: not deviated, enlarged, fixed or tender  Cervix: No CMT, no discharge or friability  Right adnexa: no mass, no tenderness and no fullness  Left adnexa: no mass, no tenderness and no fullness  Vagina: No erythema, tenderness, masses, or foreign body in the vagina  No signs of injury around the vagina  No unusual vaginal discharge   Perineum without lesions, signs of injury, erythema or swelling  Inguinal Canal:        Right: No inguinal adenopathy or hernia present  Left: No inguinal adenopathy or hernia present  Perineal hygiene reviewed   Weight bearing exercises minium of 150 mins/weekly advised  Kegel exercises recommended  SBE encouraged, ASCCP guidelines reviewed  Condoms encouraged with all sexual activity to prevent STI's  Gardisil vaccines recommended up to age 39  Calcium/ Vit D dietary requirements discussed,   Advised to call with any issues,  all concerns & questions addressed     See provided information in your after visit summary   MFM referral given   Schedule dating US   Continue PNV daily   Consult with nutrition for meal planning and dietary recommendations   F/u PNV

## 2022-04-22 NOTE — PATIENT INSTRUCTIONS
Breast Self Exam for Women   AMBULATORY CARE:   A breast self-exam (BSE)  is a way to check your breasts for lumps and other changes  Regular BSEs can help you know how your breasts normally look and feel  Most breast lumps or changes are not cancer, but you should always have them checked by a healthcare provider  Why you should do a BSE:  Breast cancer is the most common type of cancer in women  Even if you have mammograms, you may still want to do a BSE regularly  If you know how your breasts normally feel and look, it may help you know when to contact your healthcare provider  Mammograms can miss some cancers  You may find a lump during a BSE that did not show up on a mammogram   When you should do a BSE:  If you have periods, you may want to do your BSE 1 week after your period ends  This is the time when your breasts may be the least swollen, lumpy, or tender  You can do regular BSEs even if you are breastfeeding or have breast implants  Call your doctor if:   · You find any lumps or changes in your breasts  · You have breast pain or fluid coming from your nipples  · You have questions or concerns about your condition or care  How to do a BSE:       · Look at your breasts in a mirror  Look at the size and shape of each breast and nipple  Check for swelling, lumps, dimpling, scaly skin, or other skin changes  Look for nipple changes, such as a nipple that is painful or beginning to pull inward  Gently squeeze both nipples and check to see if fluid (that is not breast milk) comes out of them  If you find any of these or other breast changes, contact your healthcare provider  Check your breasts while you sit or  the following 3 positions:    ? Hang your arms down at your sides  ? Raise your hands and join them behind your head  ? Put firm pressure with your hands on your hips  Bend slightly forward while you look at your breasts in the mirror  · Lie down and feel your breasts    When you lie down, your breast tissue spreads out evenly over your chest  This makes it easier for you to feel for lumps and anything that may not be normal for your breasts  Do a BSE on one breast at a time  ? Place a small pillow or towel under your left shoulder  Put your left arm behind your head  ? Use the 3 middle fingers of your right hand  Use your fingertip pads, on the top of your fingers  Your fingertip pad is the most sensitive part of your finger  ? Use small circles to feel your breast tissue  Use your fingertip pads to make dime-sized, overlapping circles on your breast and armpits  Use light, medium, and firm pressure  First, press lightly  Second, press with medium pressure to feel a little deeper into the breast  Last, use firm pressure to feel deep within your breast     ? Examine your entire breast area  Examine the breast area from above the breast to below the breast where you feel only ribs  Make small circles with your fingertips, starting in the middle of your armpit  Make circles going up and down the breast area  Continue toward your breast and all the way across it  Examine the area from your armpit all the way over to the middle of your chest (breastbone)  Stop at the middle of your chest     ? Move the pillow or towel to your right shoulder, and put your right arm behind your head  Use the 3 fingertip pads of your left hand, and repeat the above steps to do a BSE on your right breast     What else you can do to check for breast problems or cancer:  Talk to your healthcare provider about mammograms  A mammogram is an x-ray of your breasts to screen for breast cancer or other problems  Your provider can tell you the benefits and risks of mammograms  The first mammogram is usually at age 39 or 48  Your provider may recommend you start at 36 or younger if your risk for breast cancer is high  Mammograms usually continue every 1 to 2 years until age 76         Follow up with your doctor as directed:  Write down your questions so you remember to ask them during your visits  © Copyright [employer] 2022 Information is for End User's use only and may not be sold, redistributed or otherwise used for commercial purposes  All illustrations and images included in CareNotes® are the copyrighted property of A [employer] , Inc  or Reza Ridley  The above information is an  only  It is not intended as medical advice for individual conditions or treatments  Talk to your doctor, nurse or pharmacist before following any medical regimen to see if it is safe and effective for you  Wellness Visit for Adults   AMBULATORY CARE:   A wellness visit  is when you see your healthcare provider to get screened for health problems  Your healthcare provider will also give you advice on how to stay healthy  Write down your questions so you remember to ask them  Ask your healthcare provider how often you should have a wellness visit  What happens at a wellness visit:  Your healthcare provider will ask about your health, and your family history of health problems  This includes high blood pressure, heart disease, and cancer  He or she will ask if you have symptoms that concern you, if you smoke, and about your mood  You may also be asked about your intake of medicines, supplements, food, and alcohol  Any of the following may be done:  · Your weight  will be checked  Your height may also be checked so your body mass index (BMI) can be calculated  Your BMI shows if you are at a healthy weight  · Your blood pressure  and heart rate will be checked  Your temperature may also be checked  · Blood and urine tests  may be done  Blood tests may be done to check your cholesterol levels  Abnormal cholesterol levels increase your risk for heart disease and stroke  You may also need a blood or urine test to check for diabetes if you are at increased risk   Urine tests may be done to look for signs of an infection or kidney disease  · A physical exam  includes checking your heartbeat and lungs with a stethoscope  Your healthcare provider may also check your skin to look for sun damage  · Screening tests  may be recommended  A screening test is done to check for diseases that may not cause symptoms  The screening tests you may need depend on your age, gender, family history, and lifestyle habits  For example, colorectal screening may be recommended if you are 48years old or older  Screening tests you need if you are a woman:   · A Pap smear  is used to screen for cervical cancer  Pap smears are usually done every 3 to 5 years depending on your age  You may need them more often if you have had abnormal Pap smear test results in the past  Ask your healthcare provider how often you should have a Pap smear  · A mammogram  is an x-ray of your breasts to screen for breast cancer  Experts recommend mammograms every 2 years starting at age 48 years  You may need a mammogram at age 52 years or younger if you have an increased risk for breast cancer  Talk to your healthcare provider about when you should start having mammograms and how often you need them  Vaccines you may need:   · Get an influenza vaccine  every year  The influenza vaccine protects you from the flu  Several types of viruses cause the flu  The viruses change over time, so new vaccines are made each year  · Get a tetanus-diphtheria (Td) booster vaccine  every 10 years  This vaccine protects you against tetanus and diphtheria  Tetanus is a severe infection that may cause painful muscle spasms and lockjaw  Diphtheria is a severe bacterial infection that causes a thick covering in the back of your mouth and throat  · Get a human papillomavirus (HPV) vaccine  if you are female and aged 23 to 32 or male 23 to 24 and never received it  This vaccine protects you from HPV infection  HPV is the most common infection spread by sexual contact   HPV may also cause vaginal, penile, and anal cancers  · Get a pneumococcal vaccine  if you are aged 72 years or older  The pneumococcal vaccine is an injection given to protect you from pneumococcal disease  Pneumococcal disease is an infection caused by pneumococcal bacteria  The infection may cause pneumonia, meningitis, or an ear infection  · Get a shingles vaccine  if you are 60 or older, even if you have had shingles before  The shingles vaccine is an injection to protect you from the varicella-zoster virus  This is the same virus that causes chickenpox  Shingles is a painful rash that develops in people who had chickenpox or have been exposed to the virus  How to eat healthy:  My Plate is a model for planning healthy meals  It shows the types and amounts of foods that should go on your plate  Fruits and vegetables make up about half of your plate, and grains and protein make up the other half  A serving of dairy is included on the side of your plate  The amount of calories and serving sizes you need depends on your age, gender, weight, and height  Examples of healthy foods are listed below:  · Eat a variety of vegetables  such as dark green, red, and orange vegetables  You can also include canned vegetables low in sodium (salt) and frozen vegetables without added butter or sauces  · Eat a variety of fresh fruits , canned fruit in 100% juice, frozen fruit, and dried fruit  · Include whole grains  At least half of the grains you eat should be whole grains  Examples include whole-wheat bread, wheat pasta, brown rice, and whole-grain cereals such as oatmeal     · Eat a variety of protein foods such as seafood (fish and shellfish), lean meat, and poultry without skin (turkey and chicken)  Examples of lean meats include pork leg, shoulder, or tenderloin, and beef round, sirloin, tenderloin, and extra lean ground beef   Other protein foods include eggs and egg substitutes, beans, peas, soy products, nuts, and seeds  · Choose low-fat dairy products such as skim or 1% milk or low-fat yogurt, cheese, and cottage cheese  · Limit unhealthy fats  such as butter, hard margarine, and shortening  Exercise:  Exercise at least 30 minutes per day on most days of the week  Some examples of exercise include walking, biking, dancing, and swimming  You can also fit in more physical activity by taking the stairs instead of the elevator or parking farther away from stores  Include muscle strengthening activities 2 days each week  Regular exercise provides many health benefits  It helps you manage your weight, and decreases your risk for type 2 diabetes, heart disease, stroke, and high blood pressure  Exercise can also help improve your mood  Ask your healthcare provider about the best exercise plan for you  General health and safety guidelines:   · Do not smoke  Nicotine and other chemicals in cigarettes and cigars can cause lung damage  Ask your healthcare provider for information if you currently smoke and need help to quit  E-cigarettes or smokeless tobacco still contain nicotine  Talk to your healthcare provider before you use these products  · Limit alcohol  A drink of alcohol is 12 ounces of beer, 5 ounces of wine, or 1½ ounces of liquor  · Lose weight, if needed  Being overweight increases your risk of certain health conditions  These include heart disease, high blood pressure, type 2 diabetes, and certain types of cancer  · Protect your skin  Do not sunbathe or use tanning beds  Use sunscreen with a SPF 15 or higher  Apply sunscreen at least 15 minutes before you go outside  Reapply sunscreen every 2 hours  Wear protective clothing, hats, and sunglasses when you are outside  · Drive safely  Always wear your seatbelt  Make sure everyone in your car wears a seatbelt  A seatbelt can save your life if you are in an accident  Do not use your cell phone when you are driving   This could distract you and cause an accident  Pull over if you need to make a call or send a text message  · Practice safe sex  Use latex condoms if are sexually active and have more than one partner  Your healthcare provider may recommend screening tests for sexually transmitted infections (STIs)  · Wear helmets, lifejackets, and protective gear  Always wear a helmet when you ride a bike or motorcycle, go skiing, or play sports that could cause a head injury  Wear protective equipment when you play sports  Wear a lifejacket when you are on a boat or doing water sports  © Copyright [employer] 2022 Information is for End User's use only and may not be sold, redistributed or otherwise used for commercial purposes  All illustrations and images included in CareNotes® are the copyrighted property of A [employer] , Inc  or Reza Ridley  The above information is an  only  It is not intended as medical advice for individual conditions or treatments  Talk to your doctor, nurse or pharmacist before following any medical regimen to see if it is safe and effective for you  HPV (Human Papillomavirus) Vaccine for Adults   AMBULATORY CARE:   The human papillomavirus (HPV) vaccine  is an injection given to females and males to protect against human papillomavirus infection  HPV is most commonly spread through sexual activity  It can also be spread from a mother to her baby during delivery  The HPV vaccine is most effective if given before sexual activity begins  This allows your body to build almost complete protection against HPV before you have contact with the virus  The HPV vaccine is still effective after sexual activity has begun  How the vaccine is given:  The HPV vaccine can be given with other vaccines  The vaccine is given in 2 or 3 doses through age 32:  · The first dose  is given at any time  · The second dose  is given 1 to 2 months after the first dose      · The third dose, if needed,  is given 6 months after the first dose  Reasons you should not get the HPV vaccine, or should wait to get it:   · You had a severe allergic reaction to a dose of the vaccine  · You are pregnant  Your healthcare provider will tell you when you can get the vaccine  · You are sick or have a fever  You may need to wait to get the vaccine until symptoms go away  Risks of the HPV vaccine: You may have pain, redness, or swelling where the shot was given  You may have a fever or headache  You may have an allergic reaction to the vaccine  This can be life-threatening  Call your local emergency number (911 in the [address]) if:   · You have signs of a severe allergic reaction, such as trouble breathing, hives, or wheezing  Seek care immediately if:   · You have a high fever or behavior changes that concern you  Call your doctor if:   · You have questions or concerns about the HPV vaccine  Apply a warm compress  to the area to relieve swelling and pain  Follow up with your doctor as directed:  Write down your questions so you remember to ask them during your visits  © Copyright [employer] 2022 Information is for End User's use only and may not be sold, redistributed or otherwise used for commercial purposes  All illustrations and images included in CareNotes® are the copyrighted property of A D A M , Inc  or [address] [place] [address]  The above information is an  only  It is not intended as medical advice for individual conditions or treatments  Talk to your doctor, nurse or pharmacist before following any medical regimen to see if it is safe and effective for you  Perineal Hygiene     No soaps or feminine wash to the vulva  Use only water to cleanse, or water with Dove or [employer] if necessary  No lotion to the area    Use only coconut oil for moisture if needed   No douching     Cotton underware, loose fitting clothing  Only perfume-free, dye-free laundry detergent, use a second rinse cycle   Avoid fabric softeners/dryer sheets  Coconut oil as a lubricant (if not using condoms) or another scent-free lubricant (Astroglide, Uberlube) if needed  Partner to avoid the same products as well  Over the counter probiotic to restore vaginal yoni may be helpful as well     You may also look into Boric Acid vaginal suppositories to restore vaginal PH balance for up to 2 weeks as directed on the box  You may not use these if you are pregnant  Kegel Exercises for Women   AMBULATORY CARE:   Kegel exercises  help strengthen your pelvic muscles  Pelvic muscles hold your pelvic organs, such as your bladder and uterus, in place  Kegel exercises help prevent or control problems with urine incontinence (leakage)  Incontinence may be caused by pregnancy, childbirth, or menopause  Contact your healthcare provider if:   · You cannot feel your muscles tighten or relax  · You continue to leak urine  · You have questions or concerns about your condition or care  Use the correct muscles:  Pelvic muscles are the muscles you use to control urine flow  To target these muscles, stop and start the flow of urine several times  This will help you become familiar with how it feels to tighten and relax these muscles  How to do Kegel exercises:   · Empty your bladder  You may lie down, stand up, or sit down to do these exercises  When you first try to do these exercises, it may be easier if you lie down  Tighten or squeeze your pelvic muscles slowly  It may feel like you are trying to hold back urine or gas  Hold this position for 3 seconds  Relax for 3 seconds  Repeat this cycle 10 times  · Do 10 sets of Kegel exercises, at least 3 times a day  Do not hold your breath when you do Kegel exercises  Keep your stomach, back, and leg muscles relaxed  · As your muscles get stronger, you will be able to hold the squeeze longer   Your healthcare provider may ask that you increase your pelvic muscle squeeze to 10 seconds  After you squeeze for 10 seconds, relax for 10 seconds  What else you should know:   · Once you know how to do Kegel exercises, use different positions  You can do these exercises while you lie on the floor, sit at your desk or watch TV, and while you stand  · You may notice improved bladder control within about 6 weeks  · Tighten your pelvic muscles before you sneeze, cough, or lift to prevent urine leakage  Follow up with your doctor as directed:  Write down your questions so you remember to ask them during your visits  © Copyright [employer]  Information is for End User's use only and may not be sold, redistributed or otherwise used for commercial purposes  All illustrations and images included in CareNotes® are the copyrighted property of A D A M , Inc  or [facility] Yaneli Cazares   The above information is an  only  It is not intended as medical advice for individual conditions or treatments  Talk to your doctor, nurse or pharmacist before following any medical regimen to see if it is safe and effective for you  Valuable Online Resource:     [facility] pregnancy essential guide    [email or web address] [email or web address]      On the right side of the screen is a 50 page guide providing valuable information about your entire pregnancy  On the left hand side of the site you will see several other links to great information and resources that [facility] offers     If you click on the tab that says "Pregnancy and Birth Packet" this opens another  guide to labor and delivery information as well as breast feeding information,  care, pediatricians, car seat safety and much more     The [facility] and [address] tab has a virtual tour of the new L&D unit, as well as valuable information about classes that are offered, breast feeding support, support groups and much more       I highly recommend the virtual Breast Feeding class, very informational even if you have breast fed in the past  Check for available dates ! Click around and enjoy all we have to offer! Please note that all information in regards to locations and visiting hours have not been updated due to 2 Ashley Steve delivery location is [address] [place] [address],[address], [place] [facility] [address]            Maternal Fetal Medicine     Nuchal Translucency ( NT) ultrasound is offered in the first trimester of pregnancy to assess your developing baby and look for any markers that may indicate a risk for certain chromosomal conditions such as Down's syndrome  This ultrasound is offered to all pregnant women along with several options for blood screening  During your ultrasound appointment the Perinatologist will discuss these options and together you can decide which screen is best for you  We encourage you to view the following video prior to your appointment to learn more:  [email or web address]     Please check your individual insurance plan to determine if the screening is covered, requires prior authorization or if you will incur any out of pocket cost    It is also important to know if your insurance company has a preferred in network lab such as [facility] or [facility]      Below is list of CPT codes for blood screening options  CPT codes are procedure codes that tell your insurance company what testing is being done  In order for testing to be performed in a timely and efficient manner it is best if you have this information available before your first visit with our office  Please note, [facility]'s genetic testing division is called [facility]       Sequential Screen - 2 part screen, CPT codes are dependent on the lab used:     [facility]    Part 1 code 54747,60649      Part 2 code 99571,69803,59301, [address] code  [address]                Part 2 code 38507        NIPT (cell free DNA testing)  CPT code 01328        NIPT testing through [facility] is called [id number]  Please use the  @ Ziliko   > click estimate my cost>  pregnancy>   [id number] plus  OR call # [phone] and speak to a   Be sure to ask about the Every [address] [facility] program for additional financial assistance  NIPT testing through [facility] is called [facility]   Please use the  @ [email or web address]  If you have any questions please feel free to reach out to our office at [phone]  Genetic Counseling appointments are available for any patient but strongly encouraged for Moms 28 or older or patients with family history of genetic disorders  Round Ligament Pain   WHAT YOU NEED TO KNOW:   What is round ligament pain? Round ligament pain is caused when ligaments are stretched as your uterus (womb) gets bigger during pregnancy  Round ligaments are found on each side of your uterus  They are bands of tissue that hold the uterus in place  Round ligament pain happens most often during the second trimester  It is a normal part of pregnancy and should stop by the third trimester  The pain is not serious and will not hurt your baby  What are the signs and symptoms of round ligament pain? · Pain on one or both sides of your lower abdomen or groin that may move up to your hip    · Spasms in the muscles in your abdomen    · Pain that lasts a few seconds    · Pain that happens when you exercise, sneeze, change positions, or stand quickly    How is round ligament pain diagnosed? Your healthcare provider will examine you and ask about your pain  Tell the provider when the pain started, and if you feel it on one or both sides  Your provider may ask if anything helps the pain or makes it worse  What can I do to manage my pain? Round ligament pain does not need to be treated   The following may help make you more comfortable:  · Rest as often as you can  Rest can help relieve round ligament pain  You might want to lie on the side that has pain  Place a pillow under your abdomen  Keep another pillow between your knees  · Move slowly  Sudden movement can stretch the ligaments and cause pain  Stand, sit, and change positions slowly  Try to tighten the muscles in your hips before you sneeze or laugh  You can also sit down and bring your knees up toward your abdomen  This can help relieve tension on the ligaments  · Exercise as directed  Gentle exercise can keep the ligaments loose and strengthen core (abdominal) muscles  An example is swimming, or a yoga program designed for pregnancy  Ask your healthcare provider which exercises are safe for you and how often to exercise  For most healthy women, a good goal is to try to get at least 30 minutes of exercise every day  If activity causes pain, try not to walk too long or too far at one time  Break your exercise up into short amounts  · Apply a warm compress to the area  Warmth can relieve pain and muscle spasms  Ask your healthcare provider if you can take a warm bath or use a heating pad  Keep all heat settings low  High heat can be dangerous for your baby  Do not sit in a hot tub or use hot water in your bath  You may also be able to massage the area gently while you are applying heat  Massage can help relieve pain  · Ask about pain medicines  Ask your healthcare provider before you take any medicine during pregnancy, including over-the-counter pain medicines  Your healthcare provider may recommend acetaminophen to relieve the pain  Ask how much to take and how often to take it  Follow directions  Acetaminophen can cause liver damage  Too much medicine can be harmful to your baby  When should I contact my healthcare provider? · You have pain that is spreading to other parts of your body  · You have new or worsening pain      · You have pain that lasts longer than a few minutes at a time  · You have questions or concerns about your condition or care  CARE AGREEMENT:   You have the right to help plan your care  Learn about your health condition and how it may be treated  Discuss treatment options with your healthcare providers to decide what care you want to receive  You always have the right to refuse treatment  The above information is an  only  It is not intended as medical advice for individual conditions or treatments  Talk to your doctor, nurse or pharmacist before following any medical regimen to see if it is safe and effective for you  © Copyright [employer] 2022 Information is for End User's use only and may not be sold, redistributed or otherwise used for commercial purposes   All illustrations and images included in CareNotes® are the copyrighted property of A D A M , Inc  or [address] [place] [address]

## 2022-04-25 ENCOUNTER — TELEPHONE (OUTPATIENT)
Dept: OBGYN CLINIC | Facility: CLINIC | Age: 32
End: 2022-04-25

## 2022-04-25 NOTE — TELEPHONE ENCOUNTER
Spoke to pt to try and get a better idea how far she might be  She went through fertility tx with first child  multiple IUI's etc     She took a neg PT last month so guessing about 4wks or so? She feel very brief waves of nausea but denies any other preg sx  Advised to keep her annual appt with Dhruv Gravely tomorrow  Then we can maybe determine when best to schedule her early US appt for 6-8 wks

## 2022-04-25 NOTE — TELEPHONE ENCOUNTER
Pt took 2 pregnancy tests , both positive, her lmp is unknown , and has not gotten her period since her last kid which was in January    Has an appt tommorow for her annual

## 2022-04-26 ENCOUNTER — ANNUAL EXAM (OUTPATIENT)
Dept: OBGYN CLINIC | Facility: CLINIC | Age: 32
End: 2022-04-26
Payer: COMMERCIAL

## 2022-04-26 VITALS
HEIGHT: 66 IN | DIASTOLIC BLOOD PRESSURE: 70 MMHG | SYSTOLIC BLOOD PRESSURE: 120 MMHG | BODY MASS INDEX: 39.53 KG/M2 | WEIGHT: 246 LBS

## 2022-04-26 DIAGNOSIS — Z34.81 PRENATAL CARE, SUBSEQUENT PREGNANCY IN FIRST TRIMESTER: ICD-10-CM

## 2022-04-26 DIAGNOSIS — O09.891 SHORT INTERVAL BETWEEN PREGNANCIES AFFECTING PREGNANCY IN FIRST TRIMESTER, ANTEPARTUM: ICD-10-CM

## 2022-04-26 DIAGNOSIS — O99.210 OBESITY IN PREGNANCY: ICD-10-CM

## 2022-04-26 DIAGNOSIS — N91.2 AMENORRHEA: ICD-10-CM

## 2022-04-26 DIAGNOSIS — Z01.419 ENCOUNTER FOR GYNECOLOGICAL EXAMINATION WITHOUT ABNORMAL FINDING: Primary | ICD-10-CM

## 2022-04-26 DIAGNOSIS — Z11.3 SCREENING FOR STDS (SEXUALLY TRANSMITTED DISEASES): ICD-10-CM

## 2022-04-26 PROBLEM — Z34.01 ENCOUNTER FOR SUPERVISION OF NORMAL FIRST PREGNANCY IN FIRST TRIMESTER: Status: ACTIVE | Noted: 2022-04-26

## 2022-04-26 PROBLEM — Z34.01 ENCOUNTER FOR SUPERVISION OF NORMAL FIRST PREGNANCY IN FIRST TRIMESTER: Status: RESOLVED | Noted: 2022-04-26 | Resolved: 2022-04-26

## 2022-04-26 LAB — SL AMB POCT URINE HCG: ABNORMAL

## 2022-04-26 PROCEDURE — S0612 ANNUAL GYNECOLOGICAL EXAMINA: HCPCS | Performed by: OBSTETRICS & GYNECOLOGY

## 2022-04-26 PROCEDURE — 87491 CHLMYD TRACH DNA AMP PROBE: CPT | Performed by: OBSTETRICS & GYNECOLOGY

## 2022-04-26 PROCEDURE — 87591 N.GONORRHOEAE DNA AMP PROB: CPT | Performed by: OBSTETRICS & GYNECOLOGY

## 2022-04-26 PROCEDURE — 81025 URINE PREGNANCY TEST: CPT | Performed by: OBSTETRICS & GYNECOLOGY

## 2022-04-27 LAB
C TRACH DNA SPEC QL NAA+PROBE: NEGATIVE
N GONORRHOEA DNA SPEC QL NAA+PROBE: NEGATIVE

## 2022-04-28 ENCOUNTER — ULTRASOUND (OUTPATIENT)
Dept: OBGYN CLINIC | Facility: MEDICAL CENTER | Age: 32
End: 2022-04-28
Payer: COMMERCIAL

## 2022-04-28 VITALS
WEIGHT: 246.2 LBS | BODY MASS INDEX: 39.57 KG/M2 | HEIGHT: 66 IN | SYSTOLIC BLOOD PRESSURE: 120 MMHG | DIASTOLIC BLOOD PRESSURE: 82 MMHG

## 2022-04-28 DIAGNOSIS — Z34.90 EARLY STAGE OF PREGNANCY: Primary | ICD-10-CM

## 2022-04-28 DIAGNOSIS — O09.891 SHORT INTERVAL BETWEEN PREGNANCIES AFFECTING PREGNANCY IN FIRST TRIMESTER, ANTEPARTUM: ICD-10-CM

## 2022-04-28 PROCEDURE — 76817 TRANSVAGINAL US OBSTETRIC: CPT | Performed by: STUDENT IN AN ORGANIZED HEALTH CARE EDUCATION/TRAINING PROGRAM

## 2022-04-28 PROCEDURE — 99213 OFFICE O/P EST LOW 20 MIN: CPT | Performed by: STUDENT IN AN ORGANIZED HEALTH CARE EDUCATION/TRAINING PROGRAM

## 2022-04-28 NOTE — PROGRESS NOTES
Assessment/Plan:      34 y o   at about 5 wga  GS and YS visualized  Return in 2 weeks for repeat US  She is not taking any teratogenic medications  Nausea/vomiting is minimal  Counseled to continue prenatal vitamin  Subjective:      Patient ID:      34 y o   s/p  in 2022 - presents for viability US  She stopped breastfeeding about a month ago and never had a period  She took a home pregnancy test which was positive  She has minimal nausea, no bleeding   in 2022 - gHTN  Daughter 4 months and doing wel  Review of Systems   Constitutional: Negative  Respiratory: Negative  Genitourinary: Negative  Psychiatric/Behavioral: Negative  Objective:      Physical Exam   Constitutional: She appears well-nourished  Cardiovascular: Normal rate  Pulmonary/Chest: Effort normal           Transvaginal US shows an intrauterine gestational sac and yolk sac  No fetus visualized

## 2022-05-03 ENCOUNTER — TELEPHONE (OUTPATIENT)
Dept: PERINATAL CARE | Facility: CLINIC | Age: 32
End: 2022-05-03

## 2022-05-03 NOTE — TELEPHONE ENCOUNTER
FYI -  We reached out to this pt and left a message three times    We were unable to schedule the pt for an appointment as indicated in your referral     Landen Casey Maternal Fetal Medicine  [facility]
Statement Selected

## 2022-05-18 ENCOUNTER — ULTRASOUND (OUTPATIENT)
Dept: OBGYN CLINIC | Facility: CLINIC | Age: 32
End: 2022-05-18
Payer: COMMERCIAL

## 2022-05-18 VITALS
SYSTOLIC BLOOD PRESSURE: 114 MMHG | WEIGHT: 242 LBS | HEIGHT: 66 IN | BODY MASS INDEX: 38.89 KG/M2 | DIASTOLIC BLOOD PRESSURE: 74 MMHG

## 2022-05-18 DIAGNOSIS — Z34.90 EARLY STAGE OF PREGNANCY: Primary | ICD-10-CM

## 2022-05-18 PROCEDURE — 76817 TRANSVAGINAL US OBSTETRIC: CPT | Performed by: STUDENT IN AN ORGANIZED HEALTH CARE EDUCATION/TRAINING PROGRAM

## 2022-05-18 PROCEDURE — 99213 OFFICE O/P EST LOW 20 MIN: CPT | Performed by: STUDENT IN AN ORGANIZED HEALTH CARE EDUCATION/TRAINING PROGRAM

## 2022-05-18 NOTE — PROGRESS NOTES
EARLY PREGNANCY ULTRASOUND    Ultrasound Probe Disinfection    A transvaginal ultrasound was performed  Prior to use, disinfection was performed with High Level Disinfection Process ([employer])  Probe serial number SLOGA-B: [id number] was used    Parker Harper MD  05/18/22  2:11 PM      Early OB Ultrasound Procedure Note: Transvaginal US    Technician: Study performed by the interpreting physician    Indications:  Early gestation, dating & viability    Procedure Details   The entire study was done at settings of 6 0 to 8 0 MHz  Gestational Sac: Present  Yolk sac: Present  Crown-rump length is 2 08 cm and calculates to an estimated gestational age of 11 weeks, 5 days  Embryonic cardiac activity is seen at a rate of 182 b/min    Description of fetal anatomy Normal    Cul-de-sac: no fluid  Left ovary: wnl  Right ovary: wnl    Findings:  Viable, early intrauterine pregnancy

## 2022-05-18 NOTE — PROGRESS NOTES
Assessment/Plan:     Problem List Items Addressed This Visit    None     Visit Diagnoses     Early stage of pregnancy    -  Primary    Relevant Orders    Ambulatory Referral to Maternal Fetal Medicine        Early pregnancy at 8 weeks 5 days with a calculated MARIANN of 3022 based on early ultrasound    - Genetic screening options reviewed  She is interested in NIPT, so MFM referral placed  - Prenatal care reviewed  - Pregnancy precautions reviewed    RTO for OB interview and PN-1 visit      Subjective:      Patient ID: Cristo Muñoz is a 34 y o  female  HPI  She presents today for verification of pregnancy  [id number] female, amenorrheic in postpartum stage  This pregnancy was unplanned  OBHx is significant for  2022, prior SAb  Taking a prenatal vitamin  The following portions of the patient's history were reviewed and updated as appropriate: allergies, current medications, past family history, past medical history, past social history, past surgical history and problem list     Review of Systems  no vb, lof    Objective:  /74 (BP Location: Left arm, Patient Position: Sitting, Cuff Size: Large)   Ht 5' 6" (1 676 m)   Wt 110 kg (242 lb)   LMP  (LMP Unknown)   BMI 39 06 kg/m²      Physical Exam  Vitals reviewed  Constitutional:       Appearance: She is well-developed  HENT:      Head: Normocephalic  Cardiovascular:      Rate and Rhythm: Normal rate  Pulmonary:      Effort: Pulmonary effort is normal    Abdominal:      General: There is no distension  Palpations: Abdomen is soft  Tenderness: There is no abdominal tenderness  There is no guarding or rebound  Genitourinary:     General: Normal vulva  Exam position: Lithotomy position  Vagina: No bleeding  Cervix: No cervical motion tenderness  Musculoskeletal:         General: Normal range of motion  Cervical back: Normal range of motion  Skin:     General: Skin is warm and dry  Neurological:      Mental Status: She is alert and oriented to person, place, and time     Psychiatric:         Behavior: Behavior normal

## 2022-05-25 ENCOUNTER — INITIAL PRENATAL (OUTPATIENT)
Dept: OBGYN CLINIC | Facility: CLINIC | Age: 32
End: 2022-05-25

## 2022-05-25 VITALS — WEIGHT: 242 LBS | BODY MASS INDEX: 38.89 KG/M2 | HEIGHT: 66 IN

## 2022-05-25 DIAGNOSIS — Z34.81 PRENATAL CARE, SUBSEQUENT PREGNANCY, FIRST TRIMESTER: ICD-10-CM

## 2022-05-25 NOTE — PATIENT INSTRUCTIONS
Congratulations!! Please review our Pregnancy Essential Guide and [employer] Virtual tour from our [employer]  [facility] Pregnancy Essentials Guide  [facility] ([address] [facility])     [address] [facility] (Coupay)

## 2022-05-25 NOTE — PROGRESS NOTES
OB INTAKE INTERVIEW  Patient is 34 y o y o  who presents for OB intake at 10wks  She is accompanied by: herself  The father of her baby Gal Carl) is involved in the pregnancy and is 35years old    Last Menstrual Period: ? Ultrasound: Measured 8 weeks 5 days on 2022 by [facility]  Estimated Date of Delivery: 2022 confirmed by 8 week US    Signs/Symptoms of Pregnancy  Current pregnancy symptoms: very mild nausea  Constipation no  Headaches no  Cramping/spotting no  PICA cravings no    Diabetes-  Body mass index is 39 06 kg/m²  If patient has 1 or more, please order early 1 hour GTT  History of GDM no  BMI >35 YES  History of PCOS or current metformin use YES- PCOS  History of LGA/macrosomic infant (4000g/9lbs) no    If patient has 2 or more, please order early 1 hour GTT  BMI>30 YES  AMA no  First degree relative with type 2 diabetes no  History of chronic HTN, hyperlipidemia, elevated A1C no  High risk race (, , ,  or ) no    Hypertension- if you answer yes, please order preeclampsia labs (cbc, comprehensive metabolic panel, urine protein creatinine ratio, uric acid)  History of of chronic HTN no  History of gestational HTN YES onset about 33 wks & why was scheduled for IOL at 37 wks  History of preeclampsia, eclampsia, or HELLP syndrome no  History of diabetes no  History of lupus, autoimmune disease, kidney disease no    Thyroid- if yes order TSH with reflex T4  History of thyroid disease YES - hypo    Bleeding Disorder or Hx of DVT-patient or first degree relative with history of  Order the following if not done previously     (Factor V, antithrombin III, prothrombin gene mutation, protein C and S Ag, lupus anticoagulant, anticardiolipin, beta-2 glycoprotein)   no    OB/GYN-  History of abnormal pap smear no  History of HPV no  History of Herpes/HSV no  History of other STI (gonorrhea, chlamydia, trich) no  History of prior  YESx1  History of prior  no  History of  delivery prior to 36 weeks 6 days no  History of blood transfusion no  Ok for blood transfusion YES    Substance screening- if yes outside of tobacco for her or anyone in her home-order urine drug screen  History of tobacco use YES quit   Currently using tobacco no  Currently using alcohol no  Presently using drugs no  Past drug use  no  IV drug use-If yes add Hep C antibody to labs no  Partner drug use no  Parent/Family drug use no    MRSA Screening-   Does the pt have a hx of MRSA? no  If yes- please follow MRSA protocol and obtain a nasal swab for MRSA culture    Immunizations:  Influenza vaccine given this season YES  Discussed Tdap vaccine YES  Discussed COVID Vaccine YES    Genetic/MFM-  Do you or your partner have a history of any of the following in yourselves or first degree relatives? Cystic fibrosis no  Spinal muscular atrophy no  Hemoglobinopathy/Sickle Cell/Thalassemia no  Fragile X Intellectual Disability no    If yes, discuss carrier screening and recommend consultation with [facility]/genetic counseling  If no, discuss option for carrier screening and/or genetic testing with Nuchal Ultrasound  Patient interested YES  Appointment at [facility] made YES NT is  and level 2 is on     Interview education  [facility] Pregnancy Essentials Book reviewed and discussed YES    Nurse/Family Partnership- patient may qualify NO; referral placed NO    Prenatal lab work scripts YES  Extra labs ordered:   1hr, PreE labs, TSH    The patient has a history now or in prior pregnancy notable for: hx IUI's with first 2 pregnancies ( one was a m/c), PCOS, GHTN, hypothyroid      Details that I feel the provider should be aware of: Jose L Steven and Kaitlin An are expecting baby #2 with SLOGA  She is doing well so far, no complaints or concerns  She said everything is still pretty fresh in her mind from last pregnancy  Jose Ldonal Harris 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

## 2022-06-02 ENCOUNTER — TELEPHONE (OUTPATIENT)
Dept: OBGYN CLINIC | Facility: CLINIC | Age: 32
End: 2022-06-02

## 2022-06-02 ENCOUNTER — ROUTINE PRENATAL (OUTPATIENT)
Dept: OBGYN CLINIC | Facility: MEDICAL CENTER | Age: 32
End: 2022-06-02
Payer: COMMERCIAL

## 2022-06-02 VITALS
BODY MASS INDEX: 39.98 KG/M2 | WEIGHT: 248.8 LBS | SYSTOLIC BLOOD PRESSURE: 128 MMHG | HEIGHT: 66 IN | DIASTOLIC BLOOD PRESSURE: 70 MMHG

## 2022-06-02 DIAGNOSIS — O02.1 MISSED ABORTION: Primary | ICD-10-CM

## 2022-06-02 PROCEDURE — 99213 OFFICE O/P EST LOW 20 MIN: CPT | Performed by: STUDENT IN AN ORGANIZED HEALTH CARE EDUCATION/TRAINING PROGRAM

## 2022-06-02 NOTE — TELEPHONE ENCOUNTER
11 wks had some pink spotting yesterday when she wiped, no recent intercourse, no longer spotting, has her us on 4/28, I advised her to monitor for now and if heavier bleeding or cramping to cb,   Will monitor for now and will cb w/ heavier bleeding or cramping

## 2022-06-02 NOTE — PROGRESS NOTES
Assessment/Plan:      Diagnoses and all orders for this visit:    Missed   We discussed options and pros/cons for management: expectant, cytotec, D&C  She will consider and discuss with her partner and let us know  Discussed reasons to call including heavy bleeding or fevers  Subjective:     Patient ID: Neelima Manzano is a 34 y o  female  33 yo  at 10 6/7 wga by 8 week US presents for spotting which started yesterday  She also has mild cramping  She is otherwise doing well  Her 11 month old is doing well  Review of Systems      Objective:     Physical Exam  Vitals reviewed  Pulmonary:      Effort: Pulmonary effort is normal    Neurological:      Mental Status: She is alert and oriented to person, place, and time  Psychiatric:         Behavior: Behavior normal          Transvaginal US shows fetus measuring 8 w 6 d - 2 08 cm  No cardiac motion is visualized  No fetal motion visualized

## 2022-06-03 ENCOUNTER — TELEPHONE (OUTPATIENT)
Dept: OBGYN CLINIC | Facility: CLINIC | Age: 32
End: 2022-06-03

## 2022-06-03 NOTE — TELEPHONE ENCOUNTER
----- Message from Jake Fan sent at 6/3/2022  2:39 PM EDT -----  Regarding: Thank Nikkie Godwin,  I just wanted to say Thank You for your salo  and kindness at yesterdays appointment  While the news was devastating to hear, I am glad you were my provider that day  I know this aspect of your job is not a fun one, but you handed it compassionately and I am grateful for that  Thank You for also asking about our Mariangel [place] girl! My  & I have been squeezing her extra tight the last couple of days  Also, armen if this is weird to put on here lol but if you go into the [facility] and go to "settings" and then " features" you can use car ride mode (as long as you have IOS), I locked it at level 2 and used that feature and it worked wonders! Also, my bleeding increased when I returned  home & turned bright red in color, it goes between that and spotting a pink/brown color  I know we discussed my options in the office, but my brain was reeling a bit  If I continue this course and heavy bleeding & cramping begin over the next few days, am I able to pass the tissue on my own without cytotec or a D&E? If not, do you see patients frequently needing a D&E anyway following cytotec at home, I know you said I could do 2 rounds if I needed to, but I was just wondering if you see a lot of success with cytotec or if a D&E is needed anyway in a lot of cases? Thank You so much again and have a esthela weekend soaking up all of the snuggles with your baby!

## 2022-06-06 ENCOUNTER — TELEPHONE (OUTPATIENT)
Dept: OBGYN CLINIC | Facility: CLINIC | Age: 32
End: 2022-06-06

## 2022-06-06 NOTE — TELEPHONE ENCOUNTER
Called pt- per communication consent, L/M informing pt to calloffice, in order to schedule an US visit, per Dr Meliton Victoria recommendation

## 2022-06-06 NOTE — TELEPHONE ENCOUNTER
----- Message from Trung Smith MD sent at 6/6/2022  1:21 PM EDT -----  Regarding: FW: thank you  Please call Rosa Isela Rasheed to schedule a f/u ultrasound visit  Thank yoU!  ----- Message -----  From: Jasmin Moonne  Sent: 6/6/2022   7:32 AM EDT  To: Trung Smith MD  Subject: FW: thank you                                      ----- Message -----  From: Pedro Urbano  Sent: 6/5/2022   9:59 PM EDT  To: [facility]  Subject: thank you                                        Thanks for the information! I am 99 9% sure I passed everything tonight  I didnt see the baby at all, but I definitely passed a huge clot and what Im pretty sure was the placenta  I also had intense labor-like cramping and a large amount of blood that subsided when everything passed  Should I just call the office to schedule an appointment like I normally would or do I have to do anything differently?  Thank You!    -Ang

## 2022-06-14 ENCOUNTER — ULTRASOUND (OUTPATIENT)
Dept: OBGYN CLINIC | Facility: CLINIC | Age: 32
End: 2022-06-14
Payer: COMMERCIAL

## 2022-06-14 DIAGNOSIS — O03.9 COMPLETE SPONTANEOUS ABORTION: Primary | ICD-10-CM

## 2022-06-14 DIAGNOSIS — O02.1 MISSED ABORTION: ICD-10-CM

## 2022-06-14 PROCEDURE — 76817 TRANSVAGINAL US OBSTETRIC: CPT | Performed by: OBSTETRICS & GYNECOLOGY

## 2022-06-14 PROCEDURE — 99212 OFFICE O/P EST SF 10 MIN: CPT | Performed by: OBSTETRICS & GYNECOLOGY

## 2022-06-14 NOTE — PROGRESS NOTES
Assessment/Plan:      Diagnoses and all orders for this visit:    Complete spontaneous     Missed   -     AMB US OB < 14 weeks single or first gestation level 1    Other orders  -     INOSITOL-D CHIRO-INOSITOL PO      s/p complete   Discussed condoms for now and hormonal contraception if she desires  She plans to try again for pregnancy when her daughter is 2yo  Subjective:     Patient ID: Omega Edward is a 34 y o  female  Gerhardt Pun was seen by Dr Rosina Murray on  and diagnosed with a missed   She opted for expectant management and bled heavily a few days later  She believes she saw the sac pass  She reports that since that time, her bleeding has steadily improved  She would like to confirm that her miscarriage is complete  She had difficulty with infertility in the past   Discussed how pregnancy can change that  Also discussed contraception options - she does not like the way she felt on hormonal contraception so will just try condoms for now  If she wants another method, she will call to let us know  Discussed PNV prior to attempting pregnancy          Review of Systems      Objective:     Physical Exam

## 2022-07-26 ENCOUNTER — TELEPHONE (OUTPATIENT)
Dept: OBGYN CLINIC | Facility: CLINIC | Age: 32
End: 2022-07-26

## 2022-07-26 NOTE — TELEPHONE ENCOUNTER
----- Message from Veda Rush sent at 7/26/2022  1:41 PM EDT -----  Regarding: Thank You! Hi Dr Bessy Kilpatrick this is 46 years later, but I wanted to Thank You for your care during my miscarriage  You really took the time to talk with me and I left your office feeling a lot of hope for my future pregnancies! You even shared your own personal story which resonated with me so much during a time when I really needed to know I wasnt alone  The care Lauraneptali received from all of my providers at [facility] has been so wonderful and I just really wanted to Thank You, so thanks!  I hope you have a wonderful rest of your week!    -Octavia Wooten

## 2022-09-14 ENCOUNTER — TELEPHONE (OUTPATIENT)
Dept: OBGYN CLINIC | Facility: CLINIC | Age: 32
End: 2022-09-14

## 2022-09-14 NOTE — TELEPHONE ENCOUNTER
Pt is asking how to go about getting back on the metFormin medication to help with her PCOS- hormones, she also has been having irregular periods  Does she need an appt for this?

## 2022-09-21 ENCOUNTER — OFFICE VISIT (OUTPATIENT)
Dept: OBGYN CLINIC | Facility: CLINIC | Age: 32
End: 2022-09-21
Payer: COMMERCIAL

## 2022-09-21 VITALS
SYSTOLIC BLOOD PRESSURE: 110 MMHG | WEIGHT: 252.6 LBS | DIASTOLIC BLOOD PRESSURE: 70 MMHG | BODY MASS INDEX: 40.6 KG/M2 | HEIGHT: 66 IN

## 2022-09-21 DIAGNOSIS — E28.2 PCOS (POLYCYSTIC OVARIAN SYNDROME): Primary | ICD-10-CM

## 2022-09-21 PROBLEM — Z34.81 PRENATAL CARE, SUBSEQUENT PREGNANCY IN FIRST TRIMESTER: Status: RESOLVED | Noted: 2022-04-26 | Resolved: 2022-09-21

## 2022-09-21 PROBLEM — O09.891 SHORT INTERVAL BETWEEN PREGNANCIES AFFECTING PREGNANCY IN FIRST TRIMESTER, ANTEPARTUM: Status: RESOLVED | Noted: 2022-04-26 | Resolved: 2022-09-21

## 2022-09-21 PROCEDURE — 99214 OFFICE O/P EST MOD 30 MIN: CPT | Performed by: OBSTETRICS & GYNECOLOGY

## 2022-09-21 NOTE — PATIENT INSTRUCTIONS
Natural Family Planning   AMBULATORY CARE:   Natural family planning  is a way to prevent or plan a pregnancy  Natural family planning helps you understand your body's rhythms to learn when you are most likely to get pregnant each month  Contact your healthcare provider if:   You have questions or concerns about natural family planning  Natural family planning methods: The symptothermal method  means you will chart your daily body temperature  You will need to buy a basal body thermometer at the store  Take your basal body temperature before you get out of bed in the morning  Keep track of each reading  Your basal body temperature will increase when you ovulate  You are more likely to get pregnant when it is higher  The cervical mucus discharge method  means you will check your cervical mucus each day  Discharge is clear, slippery, and stretchy when you ovulate  The amount is also higher than usual  Discharge is thick, sticky, and cloudy when you are not ovulating  Check your cervical mucus each day to learn when you ovulate  The calendar, or rhythm, method  means you will count the number of days between your periods  This will tell you when you ovulate  This method can work if the number of days in your cycle is the same each month  If you have irregular monthly periods, the natural family planning method is not as accurate  What else you need to know about natural family planning:   Natural family planning does not decrease your risk for a sexually transmitted infection (STI)  The success of natural family planning depends on how well you measure your menstrual cycle  You will need to learn the exact timing of your cycle and follow it closely   Your menstrual cycle can be affected by any of the following:     Changes in exercise habits    Caffeine or unhealthy foods    Stress, depression, and anxiety    Health problems or illness    Increasing age    Weight gain or loss    Risks of not following natural family planning correctly: You may become pregnant when you do not want to  It may be hard to know when you are fertile  Follow up with your doctor as directed:  Write down your questions so you remember to ask them during your visits  © Copyright [employer] 2022 Information is for End User's use only and may not be sold, redistributed or otherwise used for commercial purposes  All illustrations and images included in CareNotes® are the copyrighted property of A D A M , Inc  or Reza Ridley  The above information is an  only  It is not intended as medical advice for individual conditions or treatments  Talk to your doctor, nurse or pharmacist before following any medical regimen to see if it is safe and effective for you  Polycystic Ovarian Syndrome   AMBULATORY CARE:   Polycystic ovarian syndrome (PCOS)  is a group of symptoms caused by a hormone disorder  Your body produces too many hormones and your ovaries do not work correctly  Your ovaries have fluid-filled sacs with an immature egg in each one  These are called follicles  The follicles grow bigger and make your ovaries look like they have cysts in them  PCOS increases your risk for endometrial cancer and infertility  Common signs and symptoms:   Irregular or absent monthly periods    Extreme hair growth on your face, chest, and back    Acne, thinning hair or baldness on your scalp    Weight gain, especially around your abdomen    High blood sugar levels or high blood pressure    Periods of extreme sadness and extreme happiness    Difficulty getting pregnant    Darkening of the skin around your neck creases, groin, and under your breasts    Skin tags in your armpits, or on our neck    Call your doctor or gynecologist if:   Your symptoms get worse  You have questions or concerns about your condition or care      Treatment for PCOS  may include any of the following:  Medicines  may be given to control your blood sugar  You may need medicines to decrease male hormones, and increase female hormones  These medicines may also improve acne, baldness and hair growth you do not want  You may also need medicine to help you ovulate if you want to get pregnant  Lifestyle changes:      Manage other health conditions  PCOS increases your risk for diabetes, heart disease, and high blood pressure  Your healthcare provider may send you to specialists that teach you how to manage these conditions  Maintain a healthy weight  Ask your healthcare provider how much you should weigh  Ask him or her to help you create a weight loss plan if you are overweight  Weight loss can help reduce the symptoms of PCOS  You and your healthcare provider can set manageable weight loss goals  Exercise as directed  Exercise can help keep your blood sugar level steady, lower your risk for heart disease, and help you lose weight  Exercise for at least 150 minutes every week  Spread this amount of exercise over at least 3 days in the week  Do not skip exercise more than 2 days in a row  Include muscle strengthening activities 2 to 3 days each week  Examples of exercise include walking or swimming  Do not sit for longer than 30 minutes at a time  Work with your healthcare provider to create an exercise plan that is right for you  Eat a variety of healthy foods  Healthy foods include fruits, vegetables, whole-grain breads, low-fat dairy products, beans, lean meats, and fish  A dietitian may help you plan meals to help manage your other health conditions  Follow up with your doctor or gynecologist as directed: You may need to return for more tests or to check if the treatment is working  Write down your questions so you remember to ask them during your visits  © Copyright [employer] 2022 Information is for End User's use only and may not be sold, redistributed or otherwise used for commercial purposes   All illustrations and images included in CareNotes® are the copyrighted property of A D A M [employer] Inc  or Reza Cazares   The above information is an  only  It is not intended as medical advice for individual conditions or treatments  Talk to your doctor, nurse or pharmacist before following any medical regimen to see if it is safe and effective for you

## 2022-09-21 NOTE — PROGRESS NOTES
Assessment/Plan:    No problem-specific Assessment & Plan notes found for this encounter  Diagnoses and all orders for this visit:    PCOS (polycystic ovarian syndrome)  -     metFORMIN (GLUCOPHAGE) 500 mg tablet; Take 1 tablet (500 mg total) by mouth 2 (two) times a day with meals        Reviewed benefits risks side effects and administration instructions for metformin  Encouraged to take prenatal vitamin daily  Encourage optimize her health and weight  Prior to achieving pregnancy  Follow-up p r n  Subjective:      Patient ID: Marnie Machado is a 34 y o  female  33 yo female with hx of PCOS would like to restart Metformin to increase her chances of more regular ovulation  She desires pregnancy in the near future, historically irregular menses, recent miscarriage in June 2022  She is doing well dealing with this loss   Had 1 normal cycle July 26th, nothing since         The following portions of the patient's history were reviewed and updated as appropriate: allergies, current medications, past family history, past medical history, past social history, past surgical history and problem list     Review of Systems   Constitutional: Negative for chills, fatigue and fever  Eyes: Negative for visual disturbance  Respiratory: Negative for cough and shortness of breath  Cardiovascular: Negative for chest pain  Gastrointestinal: Negative for abdominal pain  Genitourinary: Positive for menstrual problem  Negative for vaginal bleeding and vaginal discharge  Objective:      /70 (BP Location: Left arm, Patient Position: Sitting)   Ht 5' 6" (1 676 m)   Wt 115 kg (252 lb 9 6 oz)   LMP  (LMP Unknown)   BMI 40 77 kg/m²          Physical Exam  Vitals and nursing note reviewed  Constitutional:       Appearance: Normal appearance  She is normal weight  HENT:      Head: Normocephalic and atraumatic     Eyes:      Conjunctiva/sclera: Conjunctivae normal    Cardiovascular:      Rate and Rhythm: Normal rate  Pulmonary:      Effort: Pulmonary effort is normal    Genitourinary:     Vagina: Normal       Cervix: Normal       Uterus: Normal        Adnexa: Right adnexa normal and left adnexa normal    Musculoskeletal:         General: Normal range of motion  Cervical back: Normal range of motion  Skin:     General: Skin is warm and dry  Neurological:      Mental Status: She is alert  Psychiatric:         Mood and Affect: Mood normal          Behavior: Behavior normal          Thought Content:  Thought content normal          Judgment: Judgment normal

## 2022-10-22 DIAGNOSIS — E28.2 PCOS (POLYCYSTIC OVARIAN SYNDROME): ICD-10-CM

## 2022-11-22 DIAGNOSIS — E03.8 SUBCLINICAL HYPOTHYROIDISM: ICD-10-CM

## 2022-11-22 RX ORDER — LEVOTHYROXINE SODIUM 0.05 MG/1
TABLET ORAL
Qty: 90 TABLET | Refills: 1 | Status: SHIPPED | OUTPATIENT
Start: 2022-11-22

## 2023-01-05 ENCOUNTER — APPOINTMENT (OUTPATIENT)
Dept: LAB | Facility: HOSPITAL | Age: 33
End: 2023-01-05

## 2023-01-05 DIAGNOSIS — E03.8 SUBCLINICAL HYPOTHYROIDISM: ICD-10-CM

## 2023-01-05 LAB — TSH SERPL DL<=0.05 MIU/L-ACNC: 2.27 UIU/ML (ref 0.45–4.5)

## 2023-04-22 ENCOUNTER — APPOINTMENT (OUTPATIENT)
Dept: LAB | Facility: HOSPITAL | Age: 33
End: 2023-04-22

## 2023-04-22 DIAGNOSIS — Z11.59 SCREENING EXAMINATION FOR POLIOMYELITIS: ICD-10-CM

## 2023-04-22 DIAGNOSIS — Z11.3 SCREENING EXAMINATION FOR VENEREAL DISEASE: ICD-10-CM

## 2023-04-22 DIAGNOSIS — E28.2 POLYCYSTIC OVARIES: ICD-10-CM

## 2023-04-22 DIAGNOSIS — Z01.83 ENCOUNTER FOR BLOOD TYPING: ICD-10-CM

## 2023-04-22 DIAGNOSIS — Z31.41 FERTILITY TESTING: ICD-10-CM

## 2023-04-22 DIAGNOSIS — N96 HABITUAL ABORTER: ICD-10-CM

## 2023-04-22 DIAGNOSIS — Z11.4 SCREENING FOR HUMAN IMMUNODEFICIENCY VIRUS: ICD-10-CM

## 2023-04-22 DIAGNOSIS — Z13.29 SCREENING FOR THYROID DISORDER: ICD-10-CM

## 2023-04-22 DIAGNOSIS — Z13.0 SCREENING FOR IRON DEFICIENCY ANEMIA: ICD-10-CM

## 2023-04-22 LAB
ABO GROUP BLD: NORMAL
ALBUMIN SERPL BCP-MCNC: 4.8 G/DL (ref 3.5–5)
ALP SERPL-CCNC: 83 U/L (ref 34–104)
ALT SERPL W P-5'-P-CCNC: 25 U/L (ref 7–52)
ANION GAP SERPL CALCULATED.3IONS-SCNC: 7 MMOL/L (ref 4–13)
AST SERPL W P-5'-P-CCNC: 20 U/L (ref 13–39)
BILIRUB SERPL-MCNC: 0.71 MG/DL (ref 0.2–1)
BLD GP AB SCN SERPL QL: NEGATIVE
BUN SERPL-MCNC: 9 MG/DL (ref 5–25)
CALCIUM SERPL-MCNC: 10.2 MG/DL (ref 8.4–10.2)
CHLORIDE SERPL-SCNC: 105 MMOL/L (ref 96–108)
CHOLEST SERPL-MCNC: 220 MG/DL
CO2 SERPL-SCNC: 24 MMOL/L (ref 21–32)
CREAT SERPL-MCNC: 0.77 MG/DL (ref 0.6–1.3)
ERYTHROCYTE [DISTWIDTH] IN BLOOD BY AUTOMATED COUNT: 13 % (ref 11.6–15.1)
GFR SERPL CREATININE-BSD FRML MDRD: 101 ML/MIN/1.73SQ M
GLUCOSE P FAST SERPL-MCNC: 83 MG/DL (ref 65–99)
HCT VFR BLD AUTO: 42 % (ref 34.8–46.1)
HDLC SERPL-MCNC: 60 MG/DL
HGB BLD-MCNC: 14.1 G/DL (ref 11.5–15.4)
LDLC SERPL CALC-MCNC: 140 MG/DL (ref 0–100)
MCH RBC QN AUTO: 30.3 PG (ref 26.8–34.3)
MCHC RBC AUTO-ENTMCNC: 33.6 G/DL (ref 31.4–37.4)
MCV RBC AUTO: 90 FL (ref 82–98)
NONHDLC SERPL-MCNC: 160 MG/DL
PLATELET # BLD AUTO: 319 THOUSANDS/UL (ref 149–390)
PMV BLD AUTO: 9.7 FL (ref 8.9–12.7)
POTASSIUM SERPL-SCNC: 3.8 MMOL/L (ref 3.5–5.3)
PROT SERPL-MCNC: 8 G/DL (ref 6.4–8.4)
RBC # BLD AUTO: 4.65 MILLION/UL (ref 3.81–5.12)
RH BLD: POSITIVE
SODIUM SERPL-SCNC: 136 MMOL/L (ref 135–147)
SPECIMEN EXPIRATION DATE: NORMAL
TRIGL SERPL-MCNC: 98 MG/DL
TSH SERPL DL<=0.05 MIU/L-ACNC: 2.75 UIU/ML (ref 0.45–4.5)
WBC # BLD AUTO: 5.25 THOUSAND/UL (ref 4.31–10.16)

## 2023-04-23 LAB
EST. AVERAGE GLUCOSE BLD GHB EST-MCNC: 97 MG/DL
HBA1C MFR BLD: 5 %
HIV 1+2 AB+HIV1 P24 AG SERPL QL IA: NORMAL
HIV 2 AB SERPL QL IA: NORMAL
HIV1 AB SERPL QL IA: NORMAL
HIV1 P24 AG SERPL QL IA: NORMAL
INSULIN SERPL-ACNC: 12.8 MU/L (ref 3–25)
PROLACTIN SERPL-MCNC: 3.2 NG/ML
RUBV IGG SERPL IA-ACNC: >175 IU/ML
T3 SERPL-MCNC: 0.9 NG/ML (ref 0.6–1.8)
T4 FREE SERPL-MCNC: 0.93 NG/DL (ref 0.76–1.46)
TREPONEMA PALLIDUM IGG+IGM AB [PRESENCE] IN SERUM OR PLASMA BY IMMUNOASSAY: NORMAL
VZV IGG SER QL IA: NORMAL

## 2023-04-24 LAB
C TRACH DNA SPEC QL NAA+PROBE: NEGATIVE
HBV SURFACE AG SER QL: NORMAL
N GONORRHOEA DNA SPEC QL NAA+PROBE: NEGATIVE

## 2023-04-25 LAB
CARDIOLIPIN IGA SER IA-ACNC: 2.3
CARDIOLIPIN IGG SER IA-ACNC: 2.8
CARDIOLIPIN IGM SER IA-ACNC: 4.3
DHEA-S SERPL-MCNC: 159 UG/DL (ref 84.8–378)
HCV AB S/CO SERPL IA: NON REACTIVE
SL AMB INTERPRETATION: NORMAL
TESTOST FREE SERPL-MCNC: 1.7 PG/ML (ref 0–4.2)
TESTOST SERPL-MCNC: 32 NG/DL (ref 8–60)
THYROGLOB AB SERPL-ACNC: 2.4 IU/ML (ref 0–0.9)
THYROPEROXIDASE AB SERPL-ACNC: 174 IU/ML (ref 0–34)

## 2023-04-29 LAB
17OHP SERPL-MCNC: 39 NG/DL
APTT HEX PL PPP: 13 SEC (ref 0–11)
APTT-LA IMM 4:1 NP PPP: 44.1 SEC (ref 0–40.5)
LA PPP-IMP: ABNORMAL
SCREEN APTT: 46.6 SEC (ref 0–43.5)
SCREEN DRVVT: 37.4 SEC (ref 0–47)

## 2023-04-30 LAB — MIS SERPL-MCNC: 7.52 NG/ML

## 2023-05-01 ENCOUNTER — APPOINTMENT (OUTPATIENT)
Dept: LAB | Facility: HOSPITAL | Age: 33
End: 2023-05-01

## 2023-05-01 DIAGNOSIS — Z34.81 PRENATAL CARE, SUBSEQUENT PREGNANCY, FIRST TRIMESTER: ICD-10-CM

## 2023-05-01 DIAGNOSIS — R79.89 ABNORMAL TSH: ICD-10-CM

## 2023-05-01 DIAGNOSIS — N96 RECURRENT PREGNANCY LOSS: ICD-10-CM

## 2023-05-01 DIAGNOSIS — E28.2 PCOS (POLYCYSTIC OVARIAN SYNDROME): ICD-10-CM

## 2023-05-02 LAB
B2 GLYCOPROT1 IGA SERPL IA-ACNC: 1.8
B2 GLYCOPROT1 IGG SERPL IA-ACNC: 0.8
B2 GLYCOPROT1 IGM SERPL IA-ACNC: <2.4

## 2023-05-18 LAB
CELLS ANALYZED: 20
CELLS COUNTED AMN: 20
CELLS KARYOTYPED.TOTAL BLD/T: 2
CLINICAL CYTOGENETICIST SPEC: NORMAL
ISCN BAND LEVEL QL: 500
KARYOTYP BLD/T: NORMAL
KARYOTYP BLD/T: NORMAL
SPECIMEN SOURCE: NORMAL

## 2023-06-12 ENCOUNTER — TELEPHONE (OUTPATIENT)
Dept: HEMATOLOGY ONCOLOGY | Facility: CLINIC | Age: 33
End: 2023-06-12

## 2023-06-12 NOTE — TELEPHONE ENCOUNTER
Patient Call    Who are you speaking with? Patient    If it is not the patient, are they listed on an active communication consent form? N/A   What is the reason for this call? Pt called to make a consult with hematology  Pt did not have diagnosis  Advised pt to ask doctor for a referral and once we have referral we will call her to schedule  Does this require a call back? N/A   If a call back is required, please list best call back number N/a   If a call back is required, advise that a message will be forwarded to their care team and someone will return their call as soon as possible  Did you relay this information to the patient?  N/A

## 2023-06-23 ENCOUNTER — RA CDI HCC (OUTPATIENT)
Dept: OTHER | Facility: HOSPITAL | Age: 33
End: 2023-06-23

## 2023-06-23 NOTE — PROGRESS NOTES
[facility] [address]  coding opportunities          Chart Reviewed number of suggestions sent to Provider: 1     Patients Insurance        Commercial Insurance: Klevin Valles       E66 01: Morbid (severe) obesity due to excess calories ([facility] [address] )     Per CMS/ICD 10 coding guidelines, to be used when BMI >=40

## 2023-06-28 ENCOUNTER — OFFICE VISIT (OUTPATIENT)
Dept: FAMILY MEDICINE CLINIC | Facility: CLINIC | Age: 33
End: 2023-06-28
Payer: COMMERCIAL

## 2023-06-28 ENCOUNTER — APPOINTMENT (OUTPATIENT)
Dept: LAB | Facility: HOSPITAL | Age: 33
End: 2023-06-28
Payer: COMMERCIAL

## 2023-06-28 VITALS
HEART RATE: 79 BPM | DIASTOLIC BLOOD PRESSURE: 72 MMHG | BODY MASS INDEX: 39.37 KG/M2 | TEMPERATURE: 98 F | HEIGHT: 66 IN | OXYGEN SATURATION: 97 % | SYSTOLIC BLOOD PRESSURE: 102 MMHG | WEIGHT: 245 LBS
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
## 2023-06-28 DIAGNOSIS — N96 RECURRENT PREGNANCY LOSS: ICD-10-CM

## 2023-06-28 DIAGNOSIS — S40.862A TICK BITE OF LEFT UPPER ARM, INITIAL ENCOUNTER: ICD-10-CM

## 2023-06-28 DIAGNOSIS — Z00.00 ANNUAL PHYSICAL EXAM: Primary | ICD-10-CM

## 2023-06-28 DIAGNOSIS — E28.2 PCOS (POLYCYSTIC OVARIAN SYNDROME): ICD-10-CM

## 2023-06-28 LAB
ALBUMIN SERPL BCP-MCNC: 4.5 G/DL (ref 3.5–5)
ALP SERPL-CCNC: 78 U/L (ref 34–104)
ALT SERPL W P-5'-P-CCNC: 20 U/L (ref 7–52)
ANION GAP SERPL CALCULATED.3IONS-SCNC: 7 MMOL/L
AST SERPL W P-5'-P-CCNC: 16 U/L (ref 13–39)
BILIRUB SERPL-MCNC: 0.67 MG/DL (ref 0.2–1)
BUN SERPL-MCNC: 11 MG/DL (ref 5–25)
CALCIUM SERPL-MCNC: 9.7 MG/DL (ref 8.4–10.2)
CHLORIDE SERPL-SCNC: 106 MMOL/L (ref 96–108)
CO2 SERPL-SCNC: 24 MMOL/L (ref 21–32)
CREAT SERPL-MCNC: 0.73 MG/DL (ref 0.6–1.3)
CREAT UR-MCNC: 194.4 MG/DL
GFR SERPL CREATININE-BSD FRML MDRD: 107 ML/MIN/1.73SQ M
GLUCOSE P FAST SERPL-MCNC: 89 MG/DL (ref 65–99)
POTASSIUM SERPL-SCNC: 4 MMOL/L (ref 3.5–5.3)
PROT SERPL-MCNC: 8 G/DL (ref 6.4–8.4)
PROT UR-MCNC: 9 MG/DL
PROT/CREAT UR: 0.05 MG/G{CREAT} (ref 0–0.1)
SODIUM SERPL-SCNC: 137 MMOL/L (ref 135–147)
TSH SERPL DL<=0.05 MIU/L-ACNC: 2.98 UIU/ML (ref 0.45–4.5)

## 2023-06-28 PROCEDURE — 99385 PREV VISIT NEW AGE 18-39: CPT | Performed by: FAMILY MEDICINE

## 2023-06-28 NOTE — PROGRESS NOTES
[address]     NAME: Kasey Jonathan  AGE: 28 y o  SEX: female  : 1990     DATE: 2023     Assessment and Plan:     Problem List Items Addressed This Visit        Endocrine    PCOS (polycystic ovarian syndrome)    Relevant Orders    Lupus Anticoagulant Reflex    Cardiolipin antibody    Beta-2 glycoprotein antibodies   Other Visit Diagnoses     Annual physical exam    -  Primary    BMI 39 0-39 9,adult        Recurrent pregnancy loss        Relevant Orders    Lupus Anticoagulant Reflex    Cardiolipin antibody    Beta-2 glycoprotein antibodies    Tick bite of left upper arm, initial encounter        Relevant Orders    Lyme Total Antibody Profile with reflex to WB        Immunizations and preventive care screenings were discussed with patient today  Appropriate education was printed on patient's after visit summary  Counseling:  Alcohol/drug use: discussed moderation in alcohol intake, the recommendations for healthy alcohol use, and avoidance of illicit drug use  Dental Health: discussed importance of regular tooth brushing, flossing, and dental visits  Sexual health: discussed sexually transmitted diseases, partner selection, use of condoms, avoidance of unintended pregnancy, and contraceptive alternatives  Exercise: the importance of regular exercise/physical activity was discussed  Recommend exercise 3-5 times per week for at least 30 minutes  BMI Counseling: Body mass index is 39 54 kg/m²  The BMI is above normal  Nutrition recommendations include decreasing portion sizes, encouraging healthy choices of fruits and vegetables, consuming healthier snacks, limiting drinks that contain sugar, moderation in carbohydrate intake, increasing intake of lean protein and reducing intake of cholesterol   Exercise recommendations include moderate physical activity 150 minutes/week and exercising 3-5 times per week  No pharmacotherapy was ordered  Rationale for BMI follow-up plan is due to patient being overweight or obese  Depression Screening and Follow-up Plan: Patient was screened for depression during today's encounter  They screened negative with a PHQ-2 score of 0  No follow-ups on file  Chief Complaint:     Chief Complaint   Patient presents with   • New Patient Visit   • Follow-up     Wants a referral for hematologist    • Tick Bite     Had a tick on her last week, sent it to lab and was pos for lyme  • Physical Exam      History of Present Illness:     Adult Annual Physical   Patient here for a comprehensive physical exam  The patient reports problems as documented below  Notes that she was bite by a tick on her left UE about 2 weeks ago  Removed without issues  Notes that she has felt fine with no rash  Tick was attached for about 33 hours  States that she sent this to the lab and it tested positive for lyme  Following with fertility  Had mildly elevated lupus anticoagulant  Diet and Physical Activity  Diet/Nutrition: well balanced diet  Notes convenience eating during the day  Exercise: no formal exercise  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  Depression Screening  PHQ-2/9 Depression Screening    Little interest or pleasure in doing things: 0 - not at all  Feeling down, depressed, or hopeless: 0 - not at all  PHQ-2 Score: 0  PHQ-2 Interpretation: Negative depression screen       General Health  Sleep: sleeps well  Hearing: normal - bilateral   Vision: no vision problems  Dental: regular dental visits, brushes teeth twice daily and flosses teeth occasionally  /GYN Health  Last menstrual period: 6/14/23, 5/12/23, 3/16/23  Has irregular menses due to PCOS  Contraceptive method: none    History of STDs?: no      Review of Systems:     Review of Systems      Past Medical History:     Past Medical History:   Diagnosis Date   • Gestational hypertension    • Hypothyroidism     dx 2021   • PCOS (polycystic ovarian syndrome)       Past Surgical History:     History reviewed  No pertinent surgical history     Social History:     Social History     Socioeconomic History   • Marital status: /Civil Union     Spouse name: None   • Number of children: None   • Years of education: None   • Highest education level: None   Occupational History   • None   Tobacco Use   • Smoking status: Former     Packs/day: 0 50     Years: 7 00     Total pack years: 3 50     Types: Cigarettes     Quit date: 2019     Years since quittin 4   • Smokeless tobacco: Never   Vaping Use   • Vaping Use: Never used   Substance and Sexual Activity   • Alcohol use: Not Currently   • Drug use: Never   • Sexual activity: Yes     Partners: Male     Birth control/protection: None   Other Topics Concern   • None   Social History Narrative   • None     Social Determinants of Health     Financial Resource Strain: Not on file   Food Insecurity: Not on file   Transportation Needs: Not on file   Physical Activity: Not on file   Stress: Not on file   Social Connections: Not on file   Intimate Partner Violence: Not on file   Housing Stability: Not on file      Family History:     Family History   Problem Relation Age of Onset   • Hypothyroidism Mother    • Hashimoto's thyroiditis Father    • No Known Problems Brother    • No Known Problems Brother    • No Known Problems Brother    • No Known Problems Daughter    • Aortic aneurysm Maternal Grandmother    • Colon cancer Maternal Grandfather    • Immunodeficiency Paternal Grandmother         wegeners dx(rare) autoimmune   • Heart disease Paternal Grandfather    • Ovarian cancer Neg Hx    • Breast cancer Neg Hx    • Cervical cancer Neg Hx    • Uterine cancer Neg Hx       Current Medications:     Current Outpatient Medications   Medication Sig Dispense Refill   • Cholecalciferol (Vitamin D3) 50 MCG (2000 UT) TABS Take 2,000 Units by mouth daily "    • INOSITOL-D CHIRO-INOSITOL PO      • levothyroxine 50 mcg tablet TAKE 1 TABLET BY MOUTH EVERY DAY 90 tablet 1   • metFORMIN (GLUCOPHAGE) 500 mg tablet TAKE 1 TABLET BY MOUTH TWICE A DAY WITH MEALS 180 tablet 1   • Prenatal Vit-Fe Fumarate-FA (PRENATAL VITAMIN AND MINERAL PO)        No current facility-administered medications for this visit  Allergies: Allergies   Allergen Reactions   • Co Q10 [Ubidecarenone] Hives      Physical Exam:     /72 (BP Location: Left arm, Patient Position: Sitting, Cuff Size: Large)   Pulse 79   Temp 98 °F (36 7 °C)   Ht 5' 6\" (1 676 m)   Wt 111 kg (245 lb)   SpO2 97%   BMI 39 54 kg/m²     Physical Exam  Vitals reviewed  Constitutional:       General: She is not in acute distress  Appearance: Normal appearance  HENT:      Head: Normocephalic and atraumatic  Right Ear: External ear normal       Left Ear: External ear normal       Nose: Nose normal       Mouth/Throat:      Mouth: Mucous membranes are moist    Eyes:      Extraocular Movements: Extraocular movements intact  Conjunctiva/sclera: Conjunctivae normal       Pupils: Pupils are equal, round, and reactive to light  Cardiovascular:      Rate and Rhythm: Normal rate and regular rhythm  Heart sounds: Normal heart sounds  Pulmonary:      Effort: Pulmonary effort is normal       Breath sounds: Normal breath sounds  Abdominal:      General: Bowel sounds are normal  There is no distension  Palpations: Abdomen is soft  Tenderness: There is no abdominal tenderness  Musculoskeletal:      Cervical back: Neck supple  Right lower leg: No edema  Left lower leg: No edema  Lymphadenopathy:      Cervical: No cervical adenopathy  Skin:     General: Skin is warm  Capillary Refill: Capillary refill takes less than 2 seconds  Findings: No rash  Neurological:      Mental Status: She is alert  Mental status is at baseline            DO DEBBI Vera " [address] [facility]

## 2023-07-14 DIAGNOSIS — E28.2 PCOS (POLYCYSTIC OVARIAN SYNDROME): ICD-10-CM

## 2023-08-05 ENCOUNTER — APPOINTMENT (OUTPATIENT)
Dept: LAB | Facility: HOSPITAL | Age: 33
End: 2023-08-05
Payer: COMMERCIAL
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
## 2023-08-05 DIAGNOSIS — N96 RECURRENT PREGNANCY LOSS: ICD-10-CM

## 2023-08-05 DIAGNOSIS — E28.2 PCOS (POLYCYSTIC OVARIAN SYNDROME): ICD-10-CM

## 2023-08-05 DIAGNOSIS — S40.862A TICK BITE OF LEFT UPPER ARM, INITIAL ENCOUNTER: ICD-10-CM

## 2023-08-05 PROCEDURE — 85613 RUSSELL VIPER VENOM DILUTED: CPT

## 2023-08-05 PROCEDURE — 85732 THROMBOPLASTIN TIME PARTIAL: CPT

## 2023-08-05 PROCEDURE — 85598 HEXAGNAL PHOSPH PLTLT NEUTRL: CPT

## 2023-08-05 PROCEDURE — 86147 CARDIOLIPIN ANTIBODY EA IG: CPT

## 2023-08-05 PROCEDURE — 86618 LYME DISEASE ANTIBODY: CPT

## 2023-08-05 PROCEDURE — 86146 BETA-2 GLYCOPROTEIN ANTIBODY: CPT

## 2023-08-06 LAB — B BURGDOR IGG+IGM SER QL IA: NEGATIVE

## 2023-08-08 LAB
B2 GLYCOPROT1 IGA SERPL IA-ACNC: 1.8
B2 GLYCOPROT1 IGG SERPL IA-ACNC: 0.9
B2 GLYCOPROT1 IGM SERPL IA-ACNC: <2.4
CARDIOLIPIN IGA SER IA-ACNC: 2.9
CARDIOLIPIN IGG SER IA-ACNC: 3.3
CARDIOLIPIN IGM SER IA-ACNC: 5.4

## 2023-08-09 LAB
APTT HEX PL PPP: 10 SEC (ref 0–11)
APTT-LA IMM 4:1 NP PPP: 57.6 SEC (ref 0–40.5)
LA PPP-IMP: ABNORMAL
SCREEN APTT: 64 SEC (ref 0–43.5)
SCREEN DRVVT: 41.8 SEC (ref 0–47)

## 2023-08-13 DIAGNOSIS — E28.2 PCOS (POLYCYSTIC OVARIAN SYNDROME): ICD-10-CM

## 2023-08-14 DIAGNOSIS — E03.8 SUBCLINICAL HYPOTHYROIDISM: ICD-10-CM

## 2023-08-14 RX ORDER — LEVOTHYROXINE SODIUM 0.05 MG/1
TABLET ORAL
Qty: 30 TABLET | Refills: 5 | Status: SHIPPED | OUTPATIENT
Start: 2023-08-14

## 2023-08-16 DIAGNOSIS — E28.2 PCOS (POLYCYSTIC OVARIAN SYNDROME): Primary | ICD-10-CM

## 2023-09-07 ENCOUNTER — APPOINTMENT (OUTPATIENT)
Dept: LAB | Facility: HOSPITAL | Age: 33
End: 2023-09-07
Payer: COMMERCIAL

## 2023-09-07 ENCOUNTER — CONSULT (OUTPATIENT)
Dept: HEMATOLOGY ONCOLOGY | Facility: CLINIC | Age: 33
End: 2023-09-07
Payer: COMMERCIAL

## 2023-09-07 VITALS
HEIGHT: 66 IN | WEIGHT: 247 LBS | OXYGEN SATURATION: 100 % | RESPIRATION RATE: 16 BRPM | BODY MASS INDEX: 39.7 KG/M2 | TEMPERATURE: 98.2 F | SYSTOLIC BLOOD PRESSURE: 126 MMHG | DIASTOLIC BLOOD PRESSURE: 86 MMHG | HEART RATE: 80 BPM

## 2023-09-07 DIAGNOSIS — E28.2 PCOS (POLYCYSTIC OVARIAN SYNDROME): ICD-10-CM

## 2023-09-07 DIAGNOSIS — R76.0 LUPUS ANTICOAGULANT POSITIVE: Primary | ICD-10-CM

## 2023-09-07 DIAGNOSIS — R79.89 ABNORMAL THYROID BLOOD TEST: ICD-10-CM

## 2023-09-07 LAB — TSH SERPL DL<=0.05 MIU/L-ACNC: 1.48 UIU/ML (ref 0.45–4.5)

## 2023-09-07 PROCEDURE — 84443 ASSAY THYROID STIM HORMONE: CPT

## 2023-09-07 PROCEDURE — 36415 COLL VENOUS BLD VENIPUNCTURE: CPT

## 2023-09-07 PROCEDURE — 99204 OFFICE O/P NEW MOD 45 MIN: CPT | Performed by: INTERNAL MEDICINE

## 2023-09-07 NOTE — PROGRESS NOTES
[address] [facility]  [facility] [address]   1990      PRIMARY HEMATOLOGIC/ONCOLOGIC DIAGNOSIS:  1. Abnormal APLS testing    HISTORY OF PRESENT ILLNESS:  Mike Zhang is a 31yo female who presents for evaluation of abnormal APLS testing. Labs as follows:    4/22/23:  Anticardiolipin IgG 2.8  Anticardiolipin IgA 2.3  Anticardiolipin IgM 4.3  Beta2 GP 1 IGG 0.8  Beta2 GP 1 IGA 1.8  Beta2 GP 1 IGM <2.4  PTT-LA Mix 44.1 (H)  Hex Phosph Neut Test 13 (H)  Results are consistent with the presence of a lupus anticoagulant. 8/5/23  No lupus anticoagulant was detected. Results suggest the presence of an   inhibitor. Anticardiolipin IgG 3.3  Anticardiolipin IgA 2.9  Anticardiolipin IgM 5.4  Beta2 GP 1 IGG 0.9  Beta2 GP 1 IGA 1.8  Beta2 GP 1 IGM <2.4  PTT-LA Mix 57.6 (H)  Hex Phosph Neut Test 10    The patient has a history of two miscarriages at 5 wks and 9wks of gestation. Denies any personal hx of VTE. She is not on blood thinners. PAST MEDICAL,PAST SURGICAL, FAMILY AND SOCIAL HISTORY:    Patient Active Problem List   Diagnosis   • PCOS (polycystic ovarian syndrome)   • Subclinical hypothyroidism     Past Medical History:   Diagnosis Date   • Gestational hypertension    • Hypothyroidism     dx 1/2021   • PCOS (polycystic ovarian syndrome)      No past surgical history on file.   Family History   Problem Relation Age of Onset   • Hypothyroidism Mother    • Hashimoto's thyroiditis Father    • No Known Problems Brother    • No Known Problems Brother    • No Known Problems Brother    • No Known Problems Daughter    • Aortic aneurysm Maternal Grandmother    • Colon cancer Maternal Grandfather    • Immunodeficiency Paternal Grandmother         wegeners dx(rare) autoimmune   • Heart disease Paternal Grandfather    • Ovarian cancer Neg Hx    • Breast cancer Neg Hx    • Cervical cancer Neg Hx    • Uterine cancer Neg Hx Social History     Socioeconomic History   • Marital status: /Civil Union     Spouse name: Not on file   • Number of children: Not on file   • Years of education: Not on file   • Highest education level: Not on file   Occupational History   • Not on file   Tobacco Use   • Smoking status: Former     Packs/day: 0.50     Years: 7.00     Total pack years: 3.50     Types: Cigarettes     Quit date: 2019     Years since quittin.6   • Smokeless tobacco: Never   Vaping Use   • Vaping Use: Never used   Substance and Sexual Activity   • Alcohol use: Not Currently   • Drug use: Never   • Sexual activity: Yes     Partners: Male     Birth control/protection: None   Other Topics Concern   • Not on file   Social History Narrative   • Not on file     Social Determinants of Health     Financial Resource Strain: Not on file   Food Insecurity: Not on file   Transportation Needs: Not on file   Physical Activity: Not on file   Stress: Not on file   Social Connections: Not on file   Intimate Partner Violence: Not on file   Housing Stability: Not on file       Current Outpatient Medications:   •  Cholecalciferol (Vitamin D3) 50 MCG (2000 UT) TABS, Take 2,000 Units by mouth daily  , Disp: , Rfl:   •  INOSITOL-D CHIRO-INOSITOL PO, , Disp: , Rfl:   •  levothyroxine 50 mcg tablet, TAKE 1 TABLET BY MOUTH EVERY DAY, Disp: 30 tablet, Rfl: 5  •  Prenatal Vit-Fe Fumarate-FA (PRENATAL VITAMIN AND MINERAL PO), , Disp: , Rfl:   •  metFORMIN (GLUCOPHAGE) 500 mg tablet, TAKE 1 TABLET BY MOUTH TWICE A DAY WITH FOOD (Patient not taking: Reported on 2023), Disp: 180 tablet, Rfl: 1  Allergies   Allergen Reactions   • Co Q10 [Ubidecarenone] Hives     Vitals:    23 1513   BP: 126/86   Pulse: 80   Resp: 16   Temp: 98.2 °F (36.8 °C)   SpO2: 100%       ROS:  CONSTITUTIONAL:  No fever. No chills. No dizziness. No weakness. EYES:  No pain, erythema, or discharge. No blurring of vision. ENT:  No sore throat, URI symptoms. No epistaxis.  No tinnitus. CARDIOVASCULAR:  No chest pain. No palpitations. No lower extremity edema. RESPIRATORY:  No shortness of breath, cough, pain with respiration, pleuritic chest pain. No hemoptysis. No dyspnea. No paroxysmal nocturnal dyspnea. GASTROINTESTINAL:  Normal appetite. No nausea, vomiting, diarrhea. No pain. No bloating. No melena. GENITOURINARY:  No frequency, urgency, nocturia. No hematuria or dysuria. MUSCULOSKELETAL:  No arthralgias or myalgias. INTEGUMENTARY:  No swelling. No bruising. No contusions. No abrasions. No lymphangitis. NEUROLOGIC:  No headache. No neck pain. No numbness or tingling of the extremities. No weakness. PSYCHIATRIC:  No confusion. ENDOCRINE:  No fatigue. No weakness. No history of thyroid, diabetes or adrenal problems. HEMATOLOGICAL:  No bleeding. No petechiae. No bruising.     PHYSICAL EXAM:    GENERAL: AAO x 3  HEENT: AT,NC  CVS: S1S2 RRR  LUNGS: CTA b/l  ABD: NT,ND, +BS  EXTR: no edema  NEURO: CN II-XII grossly intact    LABS:  I have reviewed pertinent labs:  CBC:   Lab Results   Component Value Date    WBC 5.25 04/22/2023    RBC 4.65 04/22/2023    HGB 14.1 04/22/2023    HCT 42.0 04/22/2023    MCV 90 04/22/2023     04/22/2023    MCH 30.3 04/22/2023    MCHC 33.6 04/22/2023    RDW 13.0 04/22/2023    MPV 9.7 04/22/2023    NEUTROABS 7.91 (H) 10/29/2021     CMP:   Lab Results   Component Value Date    SODIUM 137 06/28/2023    K 4.0 06/28/2023     06/28/2023    CO2 24 06/28/2023    AGAP 7 06/28/2023    BUN 11 06/28/2023    CREATININE 0.73 06/28/2023    GLUC 91 01/04/2022    GLUF 89 06/28/2023    CALCIUM 9.7 06/28/2023    AST 16 06/28/2023    ALT 20 06/28/2023    ALKPHOS 78 06/28/2023    TP 8.0 06/28/2023    ALB 4.5 06/28/2023    TBILI 0.67 06/28/2023    EGFR 107 06/28/2023     Liver Enzymes:   Lab Results   Component Value Date    AST 16 06/28/2023    ALT 20 06/28/2023    ALKPHOS 78 06/28/2023    TP 8.0 06/28/2023    ALB 4.5 06/28/2023    TBILI 0.67 06/28/2023 Vitamin B12 No results found for: "SGPHWKFG40"  Iron Study No results found for: "RETIC", "RETICCTPCT", "FERRITIN", "CONCFE", "TIBC", "PRIMIDONE", "FOLATEHEMO", "IRON"  Folate No results found for: "FOLATE"  Magnesium No results found for: "MG"  Phosphorus No results found for: "PHOS"  Coagulation Panel No results found for: "DDIMER", "PROTIME", "INR", "PTT"      IMAGING:  No results found. I reviewed the above laboratory and imaging data. ASSESSMENT/PLAN:  1. Abnormal APLS testing. Will obtain repeat testing from a different lab and mixing studies. F/u in 1-2m for further recommendations.

## 2023-09-25 ENCOUNTER — APPOINTMENT (OUTPATIENT)
Dept: LAB | Facility: HOSPITAL | Age: 33
End: 2023-09-25
Attending: INTERNAL MEDICINE
Payer: COMMERCIAL

## 2023-09-25 DIAGNOSIS — R76.0 LUPUS ANTICOAGULANT POSITIVE: Primary | ICD-10-CM

## 2023-09-25 LAB
APTT PPP: 37 SECONDS (ref 23–37)
CRP SERPL QL: 2.1 MG/L
INR PPP: 0.93 (ref 0.84–1.19)
PROTHROMBIN TIME: 13 SECONDS (ref 11.6–14.5)

## 2023-09-25 PROCEDURE — 85732 THROMBOPLASTIN TIME PARTIAL: CPT

## 2023-09-25 PROCEDURE — 85705 THROMBOPLASTIN INHIBITION: CPT

## 2023-09-25 PROCEDURE — 85610 PROTHROMBIN TIME: CPT

## 2023-09-25 PROCEDURE — 85613 RUSSELL VIPER VENOM DILUTED: CPT

## 2023-09-25 PROCEDURE — 85611 PROTHROMBIN TEST: CPT

## 2023-09-25 PROCEDURE — 86147 CARDIOLIPIN ANTIBODY EA IG: CPT

## 2023-09-25 PROCEDURE — 85730 THROMBOPLASTIN TIME PARTIAL: CPT

## 2023-09-25 PROCEDURE — 85598 HEXAGNAL PHOSPH PLTLT NEUTRL: CPT

## 2023-09-25 PROCEDURE — 86140 C-REACTIVE PROTEIN: CPT

## 2023-09-25 PROCEDURE — 86146 BETA-2 GLYCOPROTEIN ANTIBODY: CPT

## 2023-09-25 PROCEDURE — 36415 COLL VENOUS BLD VENIPUNCTURE: CPT

## 2023-09-25 PROCEDURE — 85670 THROMBIN TIME PLASMA: CPT

## 2023-09-26 LAB
B2 GLYCOPROT1 IGA SERPL IA-ACNC: 1.5
B2 GLYCOPROT1 IGG SERPL IA-ACNC: 1
B2 GLYCOPROT1 IGM SERPL IA-ACNC: <2.4
CARDIOLIPIN IGA SER IA-ACNC: 3.2
CARDIOLIPIN IGG SER IA-ACNC: 3.3
CARDIOLIPIN IGM SER IA-ACNC: 3.7
PROTHROMBIN TIME: 12.1 SECONDS (ref 11.6–14.5)

## 2023-09-29 LAB
APTT HEX PL PPP: 11 SEC (ref 0–11)
APTT SCREEN TO CONFIRM RATIO: 1.07 RATIO (ref 0–1.34)
APTT-LA IMM 4:1 NP PPP: 43.7 SEC (ref 0–40.5)
CONFIRM APTT/NORMAL: 36.7 SEC (ref 0–47.6)
LA PPP-IMP: ABNORMAL
SCREEN APTT: 46 SEC (ref 0–43.5)
SCREEN DRVVT: 41.5 SEC (ref 0–47)
THROMBIN TIME: 18.1 SEC (ref 0–23)

## 2023-10-19 ENCOUNTER — TELEPHONE (OUTPATIENT)
Dept: HEMATOLOGY ONCOLOGY | Facility: CLINIC | Age: 33
End: 2023-10-19

## 2023-10-19 DIAGNOSIS — D69.9 ANTICOAGULANT DISORDER (HCC): Primary | ICD-10-CM

## 2023-10-20 ENCOUNTER — APPOINTMENT (OUTPATIENT)
Dept: LAB | Age: 33
End: 2023-10-20
Payer: COMMERCIAL

## 2023-10-20 DIAGNOSIS — D69.9 ANTICOAGULANT DISORDER (HCC): ICD-10-CM

## 2023-10-20 LAB
APTT PPP: 35 SECONDS (ref 23–37)
BASOPHILS # BLD AUTO: 0.04 THOUSANDS/ÂΜL (ref 0–0.1)
BASOPHILS NFR BLD AUTO: 1 % (ref 0–1)
EOSINOPHIL # BLD AUTO: 0.19 THOUSAND/ÂΜL (ref 0–0.61)
EOSINOPHIL NFR BLD AUTO: 3 % (ref 0–6)
ERYTHROCYTE [DISTWIDTH] IN BLOOD BY AUTOMATED COUNT: 12.9 % (ref 11.6–15.1)
HCT VFR BLD AUTO: 41 % (ref 34.8–46.1)
HGB BLD-MCNC: 13.2 G/DL (ref 11.5–15.4)
IMM GRANULOCYTES # BLD AUTO: 0.02 THOUSAND/UL (ref 0–0.2)
IMM GRANULOCYTES NFR BLD AUTO: 0 % (ref 0–2)
LYMPHOCYTES # BLD AUTO: 1.75 THOUSANDS/ÂΜL (ref 0.6–4.47)
LYMPHOCYTES NFR BLD AUTO: 30 % (ref 14–44)
MCH RBC QN AUTO: 30.3 PG (ref 26.8–34.3)
MCHC RBC AUTO-ENTMCNC: 32.2 G/DL (ref 31.4–37.4)
MCV RBC AUTO: 94 FL (ref 82–98)
MONOCYTES # BLD AUTO: 0.45 THOUSAND/ÂΜL (ref 0.17–1.22)
MONOCYTES NFR BLD AUTO: 8 % (ref 4–12)
NEUTROPHILS # BLD AUTO: 3.34 THOUSANDS/ÂΜL (ref 1.85–7.62)
NEUTS SEG NFR BLD AUTO: 58 % (ref 43–75)
NRBC BLD AUTO-RTO: 0 /100 WBCS
PLATELET # BLD AUTO: 318 THOUSANDS/UL (ref 149–390)
PMV BLD AUTO: 10.1 FL (ref 8.9–12.7)
RBC # BLD AUTO: 4.36 MILLION/UL (ref 3.81–5.12)
WBC # BLD AUTO: 5.79 THOUSAND/UL (ref 4.31–10.16)

## 2023-10-20 PROCEDURE — 85280 CLOT FACTOR XII HAGEMAN: CPT

## 2023-10-20 PROCEDURE — 85240 CLOT FACTOR VIII AHG 1 STAGE: CPT

## 2023-10-20 PROCEDURE — 85732 THROMBOPLASTIN TIME PARTIAL: CPT

## 2023-10-20 PROCEDURE — 85270 CLOT FACTOR XI PTA: CPT

## 2023-10-20 PROCEDURE — 85246 CLOT FACTOR VIII VW ANTIGEN: CPT

## 2023-10-20 PROCEDURE — 85025 COMPLETE CBC W/AUTO DIFF WBC: CPT

## 2023-10-20 PROCEDURE — 36415 COLL VENOUS BLD VENIPUNCTURE: CPT

## 2023-10-20 PROCEDURE — 85250 CLOT FACTOR IX PTC/CHRSTMAS: CPT

## 2023-10-23 LAB
FACT XIIIA PPP-ACNC: 85 % (ref 56–140)
VWF AG ACT/NOR PPP IA: 111 % (ref 50–200)

## 2023-10-24 LAB
FACT IX ACT/NOR PPP: 127 % (ref 60–177)
FACT XI ACT/NOR PPP: 102 % (ref 60–150)
FACT XII ACT/NOR PPP: 112 % (ref 50–150)

## 2023-11-15 ENCOUNTER — TELEPHONE (OUTPATIENT)
Dept: HEMATOLOGY ONCOLOGY | Facility: CLINIC | Age: 33
End: 2023-11-15

## 2023-11-15 NOTE — TELEPHONE ENCOUNTER
Called patient to reschedule appt .  Patient stated that she will call at a later time to reschedule if necessary

## 2023-12-16 ENCOUNTER — APPOINTMENT (OUTPATIENT)
Age: 33
End: 2023-12-16
Payer: COMMERCIAL

## 2023-12-16 DIAGNOSIS — Z13.29 SCREENING FOR THYROID DISORDER: ICD-10-CM

## 2023-12-16 LAB — TSH SERPL DL<=0.05 MIU/L-ACNC: 2.29 UIU/ML (ref 0.45–4.5)

## 2023-12-16 PROCEDURE — 84443 ASSAY THYROID STIM HORMONE: CPT

## 2023-12-16 PROCEDURE — 36415 COLL VENOUS BLD VENIPUNCTURE: CPT

## 2024-01-12 NOTE — PATIENT INSTRUCTIONS
Congratulations!! Please review our Pregnancy Essential Guide and [facility] L&D Virtual tour from our networks website.     [facility] Pregnancy Essentials Guide  [facility] Women's Health ([email or web address])     [facility] - Virtual Tour ([email or web address])     [facility] ([email or web address]) (Lab Locations)

## 2024-01-15 ENCOUNTER — INITIAL PRENATAL (OUTPATIENT)
Dept: OBGYN CLINIC | Facility: CLINIC | Age: 34
End: 2024-01-15

## 2024-01-15 VITALS — BODY MASS INDEX: 39.7 KG/M2 | WEIGHT: 247 LBS | HEIGHT: 66 IN

## 2024-01-15 DIAGNOSIS — Z34.81 PRENATAL CARE, SUBSEQUENT PREGNANCY, FIRST TRIMESTER: Primary | ICD-10-CM

## 2024-01-15 PROCEDURE — OBC

## 2024-01-15 RX ORDER — PROGESTERONE 200 MG/1
CAPSULE ORAL
COMMUNITY
Start: 2023-11-13 | End: 2024-01-20

## 2024-01-15 NOTE — PROGRESS NOTES
OB INTAKE INTERVIEW  Patient is 34 y.o.y.o. who presents for OB intake at 13 wks  She is accompanied by herself during this encounter  The father of her baby (Akil) is involved in the pregnancy and is 36 years old.      Last Menstrual Period: unknown  Ultrasound: Measured 9 weeks 2 days on 2023  Estimated Date of Delivery: 24 confirmed by 9 week US    Signs/Symptoms of Pregnancy  Current pregnancy symptoms: nausea in 1st trimester no vomiting has since resolved  Constipation YES, on and off   Headaches no  Cramping/spotting YES, intermittent cramping on and off   PICA cravings no    Diabetes-  Body mass index is 39.87 kg/m².  If patient has 1 or more, please order early 1 hour GTT  History of GDM no  BMI >35 YES  History of PCOS or current metformin use YES  History of LGA/macrosomic infant (4000g/9lbs) no    If patient has 2 or more, please order early 1 hour GTT  BMI>30 YES  AMA YES  First degree relative with type 2 diabetes no  History of chronic HTN, hyperlipidemia, elevated A1C no  High risk race (, , ,  or ) no    Hypertension- if you answer yes, please order preeclampsia labs (cbc, comprehensive metabolic panel, urine protein creatinine ratio, uric acid)  History of of chronic HTN no  History of gestational HTN YES  History of preeclampsia, eclampsia, or HELLP syndrome no  History of diabetes no  History of lupus, autoimmune disease, kidney disease no    Thyroid- if yes order TSH with reflex T4  History of thyroid disease YES    Bleeding Disorder or Hx of DVT-patient or first degree relative with history of. Order the following if not done previously.   (Factor V, antithrombin III, prothrombin gene mutation, protein C and S Ag, lupus anticoagulant, anticardiolipin, beta-2 glycoprotein)   no    OB/GYN-  History of abnormal pap smear no       Date of last pap smear 21  History of HPV no  History of Herpes/HSV no  History of other  STI (gonorrhea, chlamydia, trich) no  History of prior  YES  History of prior  no  History of  delivery prior to 36 weeks 6 days no  History of blood transfusion no  Ok for blood transfusion yes    Substance screening- if yes outside of tobacco for her or anyone in her home-order urine drug screen  History of tobacco use no  Currently using tobacco no  Currently using alcohol no  Presently using drugs no  Past drug use  no  IV drug use- no  Partner drug use no  Parent/Family drug use no    MRSA Screening-   Does the pt have a hx of MRSA? no  If yes- please follow MRSA protocol and obtain a nasal swab for MRSA culture    Immunizations:  Influenza vaccine given this season yes  Discussed Tdap vaccine yes  Discussed COVID Vaccine yes    Genetic/MFM-  Do you or your partner have a history of any of the following in yourselves or first degree relatives?  Cystic fibrosis no  Spinal muscular atrophy no  Hemoglobinopathy/Sickle Cell/Thalassemia no  Fragile X Intellectual Disability no    If yes, discuss Carrier Screening and recommend consultation with MFM/genetic counseling and place specific [facility] Referral for.  If no, discuss Carrier Screening being completed once in a lifetime as a standard of care lab test along with Nuchal Ultrasound. Place orders for [id number] and [id number] along with MFM Referral.  Patient interested no, all testing done with patricia lindo    Appointment at [facility] made no, patient referral placed and number given to patient to call for appointment.     Interview education  [facility] Pregnancy Essentials Book reviewed, discussed and attached to their AVS yes    Nurse/Family Partnership- patient may qualify no; referral placed no    Prenatal lab work scripts yes  Extra labs ordered:  1 hr gtt   CMP  Protein/Creatine ratio  Uric Acid  TSH/ Free T4    Aspirin/Preeclampsia Screen    Risk Level Risk Factor Recommendation   LOW Prior Uncomplicated full-term delivery YES No Aspirin recommendation         MODERATE Nulliparity no Recommend low-dose aspirin if     BMI>30 YES 2 or more moderate risk factors    Family History Preeclampsia (mother/sister) no     35yr old or greater YES      or Low Socioeconomic no     IVF Pregnancy  no     Personal History Risks (low birth weight, prior adverse preg outcome, >10yr preg interval) YES, hx of gHTN         HIGH History of Preeclampsia no Recommend low-dose aspirin if     Multifetal gestation no 1 or more high risk factors    Chronic HTN no     Type 1 or 2 Diabetes no     Renal Disease no     Autoimmune Disease  no      Contraindications to ASA therapy:  NSAID/ ASA allergy: no  Nasal polyps: no  Asthma with history of ASA induced bronchospasm: no  Relative contraindications:  History of GI bleed: no  Active peptic ulcer disease: no  Severe hepatic dysfunction: no    Patient should be recommended to take ASA 162mg during this pregnancy from 12-36wks to lower her risk of preeclampsia: yes, patient meet criteria for ASA therapy aware to start right away as she is 13 weeks.       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 hypothyroidism      Details that I feel the provider should be aware of: This was an planned pregnancy for Renee. She has a history of PCOS and she had a LMP back in July so lmp for this pregnancy is unknown. She followed with Jovany lindo when she spontaneously conceived and then transferred care to [facility]. We delivered their daughter Judy Muñoz) in 2022 vis spontaneous vaginal delivery, she was born at 37 w due to gHTN. She had genetic testing with Jovany lindo, no additional testing ordered. She has pn1 labs, MFM referral, preE labs, TSH, and 1 hr gtt ordered. She is aware of needed ASA therapy, she will start that right away. She is overall feeling well, she had some nausea which has started to resolve. Some  on and off again constipation and some RLP with walking around work.       PN1 visit scheduled. The patient was oriented to our practice, reviewed delivering physicians and [facility] for Delivery. All questions were answered.    Interviewed by: Margaret Salinas RN

## 2024-01-17 ENCOUNTER — TELEPHONE (OUTPATIENT)
Facility: HOSPITAL | Age: 34
End: 2024-01-17

## 2024-01-17 NOTE — TELEPHONE ENCOUNTER
LVM w/office number regarding PT's 1/25 appt w/GC. Unsure if PT wanted individual consult or if she was having appt due to missing NT. If missing NT, PT should be moved to 2nd trimester class w/Ange @ 3:30 pm, or can stay @ 5:30 if individual consult. Waiting for PT to call back for more info to ensure she is scheduled properly.

## 2024-01-19 ENCOUNTER — TELEPHONE (OUTPATIENT)
Dept: HEMATOLOGY ONCOLOGY | Facility: CLINIC | Age: 34
End: 2024-01-19

## 2024-01-19 ENCOUNTER — ROUTINE PRENATAL (OUTPATIENT)
Dept: PERINATAL CARE | Facility: OTHER | Age: 34
End: 2024-01-19
Payer: COMMERCIAL

## 2024-01-19 VITALS
BODY MASS INDEX: 39.73 KG/M2 | HEIGHT: 66 IN | WEIGHT: 247.2 LBS | HEART RATE: 95 BPM | DIASTOLIC BLOOD PRESSURE: 70 MMHG | SYSTOLIC BLOOD PRESSURE: 140 MMHG

## 2024-01-19 DIAGNOSIS — Z87.59 HISTORY OF GESTATIONAL HYPERTENSION: ICD-10-CM

## 2024-01-19 DIAGNOSIS — R03.0 ELEVATED BLOOD PRESSURE READING IN OFFICE WITHOUT DIAGNOSIS OF HYPERTENSION: ICD-10-CM

## 2024-01-19 DIAGNOSIS — Z34.81 PRENATAL CARE, SUBSEQUENT PREGNANCY, FIRST TRIMESTER: ICD-10-CM

## 2024-01-19 DIAGNOSIS — E03.9 HYPOTHYROID IN PREGNANCY, ANTEPARTUM, FIRST TRIMESTER: ICD-10-CM

## 2024-01-19 DIAGNOSIS — O99.211 OBESITY DURING PREGNANCY IN FIRST TRIMESTER: ICD-10-CM

## 2024-01-19 DIAGNOSIS — O09.521 SUPERVISION OF ELDERLY MULTIGRAVIDA, FIRST TRIMESTER: Primary | ICD-10-CM

## 2024-01-19 DIAGNOSIS — Z36.82 NUCHAL TRANSLUCENCY OF FETUS ON PRENATAL ULTRASOUND: ICD-10-CM

## 2024-01-19 DIAGNOSIS — Z3A.13 13 WEEKS GESTATION OF PREGNANCY: ICD-10-CM

## 2024-01-19 DIAGNOSIS — O99.281 HYPOTHYROID IN PREGNANCY, ANTEPARTUM, FIRST TRIMESTER: ICD-10-CM

## 2024-01-19 PROCEDURE — 76801 OB US < 14 WKS SINGLE FETUS: CPT | Performed by: OBSTETRICS & GYNECOLOGY

## 2024-01-19 PROCEDURE — 76813 OB US NUCHAL MEAS 1 GEST: CPT | Performed by: OBSTETRICS & GYNECOLOGY

## 2024-01-19 PROCEDURE — 99244 OFF/OP CNSLTJ NEW/EST MOD 40: CPT | Performed by: OBSTETRICS & GYNECOLOGY

## 2024-01-19 NOTE — TELEPHONE ENCOUNTER
Appointment Schedule   Who are you speaking with? Patient   If it is not the patient, are they listed on an active communication consent form? Yes   Which provider is the appointment scheduled with? Dr. Bennett   At which location is the appointment scheduled for? [facility]   When is the appointment scheduled?  Please list date and time 2/29 4pm   What is the reason for this appointment? Pregnant, antiphospholipid syndrome, MAIA Apostolova   Did patient voice understanding of the details of this appointment? Yes   Was the no show policy reviewed with patient? Yes

## 2024-01-19 NOTE — PROGRESS NOTES
"Patient chose to have [facility] Non-invasive Prenatal Screen with fetal sex.   Patient choose billed through insurance.   Patient assistance program (PAP) application provided to patient no.  PAP sent with specimen No.     Patient given brochure and is aware [employer] will contact patients insurance and coordinate coverage.  Patient made aware she will receive a text message and e-mail from [employer].   Patient informed text message will come from area code  \"415\".   Provided [employer] Client Services # [phone] and web site at [email or web address].    Blood collection tubes labeled with patient identifiers (name, date of birth).     Printed [facility] lab order and test kit given with FED EX packaging (Tracking # [id number]). Patient instructed to take to a [facility] outpatient lab for blood collection.  Requested patient notify MFM (via phone call or [facility] message) when blood collected so office can follow up on results.     Copy of lab order scanned to Epic media.    Maternal Fetal Medicine will have results in approximately 7-10 business days and will call patient or notify via [employer].  Patient aware viewing lab result online will reveal fetal sex If ordered.    Patient verbalized understanding of all instructions and no questions at this time.          "

## 2024-01-19 NOTE — LETTER
2024     ERIC Simms  [address] [address]    Patient: Rachel Doe   YOB: 1990   Date of Visit: 2024       Dear Dr. Keys:    Thank you for referring Rachel Doe to me for evaluation. Below are my notes for this consultation.    If you have questions, please do not hesitate to call me. I look forward to following your patient along with you.         Sincerely,        Yasmine Verma MD        CC: No Recipients    Yasmine Verma MD  2024 11:29 AM  Sign when Signing Visit  OFFICE CONSULT  Referring physician:   Eric Simms  [address] [place] [address]      Dear Kanika Keys      Thank you for requesting a  consultation on your patient Ms. Rachel Doe for the following indications:  Genetic screening    History  Medications: Aspirin 162 mg daily, levothyroxine 50 mcg daily, prenatal vitamins daily  Allergies to medications: CoQ 10 causes hives  Past medical history: Obesity(pregravid BMI of 39), PCOS, advanced maternal age, history of gestational hypertension, subclinical hypothyroidism  Past surgical history: None  Past obstetrical history:  3/2021 at 5 weeks she had a miscarriage  2022 at 37 weeks and 1 day she had a 6 pound 7 ounce baby girl vaginally.  Her pregnancy was complicated by gestational hypertension that required induction.   2022 at 10 weeks she had a miscarriage  Social history: Past history of smoking and quit in 2019.  Denies current substance use  First generation family history: Thyroid disease in both parents    Tsh 2.295 on 23    Ultrasound findings: The ultrasound shows a fetus concordant with dates. The nasal bone was not seen due to fetal position and the nuchal translucency appears normal. No malformations are seen on today's early ultrasound.     The patient was informed of the findings and counseled  about the limitations of the exam in detecting all forms of fetal congenital abnormalities.    She does not report any vaginal bleeding or uterine cramping or contractions.      Specific counseling was provided on the following problems:  We discussed the options for genetic screening which include invasive testing on the fetal placenta or on fetal skin cells within the amniotic fluid and compared this to noninvasive testing which includes cell free DNA screening.  We reviewed the risks, the benefits and the limitations of each.  In the end patient chose to complete the cell free DNA screen was given the kit to take to the lab to be drawn with other lab work.  In the first trimester, the normal range for TSH level is 0.1 to 2.5 mIU/L; this level increases to 0.2 to 3.0 mIU/L in the second trimester and 0.3 to 3.0 mIU/L in the third trimester.  Her TSH levels should be rechecked with each trimester and then her meds titrated to keep her TSH in the normal range for pregnancy. Concurrent use of iron or calcium and her thyroid meds may result in decreased effectiveness of her meds.   Pregnant women with a BMI>30 ideally should aim for maximum weight gain of 11-20 pounds ideally via healthy diet and regular exercise. Maternal BMI above 30 in pregnancy confers increased risks of preeclampsia, GDM, cardiac dysfunction, sleep apnea,  delivery, and VTE, and fetal risks include miscarriage, fetal anomalies (along with reduced detection), and stillbirth, macrosomia and impaired growth. Growth assessment is advised in the third trimester. For women with prepregnancy BMI greater than 35, we recommend weekly antepartum fetal surveillance beginning at 37 weeks to continue until delivery.  She has an order slip for an early diabetes screen.   Baby aspirin 162 milligrams orally taken till 36w0d has been associated with a lower risk of developing preeclampsia in pregnancy.   BP today was elevated. She reports no history of  hypertension outside of pregnancy. She has a cuff at home and reported to nursing that her cuff read a normal value < 140/90 this am.  Baseline preeclamptic labs have been ordered to be completed through her ob office. Patients with an elevated bp prior to 20 weeks of pregnancy may be developing signs of chronic hypertension. The goal blood pressure for pregnancy is <140/90. Medical management for any further consistently elevated bps is recommended. Antepartum fetal surveillance for chronic hypertension would only be indicated if medical management of hypertension is necessary.  Delivery timing is based on BP control and use of medications and should be considered in accordance with ACOG recommendations.    Future tests recommended:  The results of her NIPT will return in 7-10 days.  Screening for spina bifida with an MSAFP screen is a future test that can be prescribed through her OB office.  This blood work should be drawn preferably at 16 to 18 weeks so that the results return prior to her next scan.  The test though can be run until 21 weeks and 6 days if needed.    Future ultrasounds ordered today:   Fetal Level II ultrasound imaging is recommended at 19-20 weeks' gestation.    Pre visit time reviewing her records   5 minutes  Face to face time 12 minutes  Post visit time on documentation of note, updating her problem list, adding orders and prescriptions 25 minutes.  Procedures that were completed today were charged separately.   The level of decision making was low complexity.    Yasmine Verma MD

## 2024-01-19 NOTE — PROGRESS NOTES
OFFICE CONSULT  Referring physician:   Floridalma Simms  [address] [place] [address]      Dear Kanika Keys      Thank you for requesting a  consultation on your patient Ms. Rachel Doe for the following indications:  Genetic screening    History  Medications: Aspirin 162 mg daily, levothyroxine 50 mcg daily, prenatal vitamins daily  Allergies to medications: CoQ 10 causes hives  Past medical history: Obesity(pregravid BMI of 39), PCOS, advanced maternal age, history of gestational hypertension, subclinical hypothyroidism  Past surgical history: None  Past obstetrical history:  3/2021 at 5 weeks she had a miscarriage  2022 at 37 weeks and 1 day she had a 6 pound 7 ounce baby girl vaginally.  Her pregnancy was complicated by gestational hypertension that required induction.   2022 at 10 weeks she had a miscarriage  Social history: Past history of smoking and quit in 2019.  Denies current substance use  First generation family history: Thyroid disease in both parents    Tsh 2.295 on 23    Ultrasound findings: The ultrasound shows a fetus concordant with dates. The nasal bone was not seen due to fetal position and the nuchal translucency appears normal. No malformations are seen on today's early ultrasound.     The patient was informed of the findings and counseled about the limitations of the exam in detecting all forms of fetal congenital abnormalities.    She does not report any vaginal bleeding or uterine cramping or contractions.      Specific counseling was provided on the following problems:  We discussed the options for genetic screening which include invasive testing on the fetal placenta or on fetal skin cells within the amniotic fluid and compared this to noninvasive testing which includes cell free DNA screening.  We reviewed the risks, the benefits and the limitations of each.  In the end patient chose to complete the cell free  DNA screen was given the kit to take to the lab to be drawn with other lab work.  In the first trimester, the normal range for TSH level is 0.1 to 2.5 mIU/L; this level increases to 0.2 to 3.0 mIU/L in the second trimester and 0.3 to 3.0 mIU/L in the third trimester.  Her TSH levels should be rechecked with each trimester and then her meds titrated to keep her TSH in the normal range for pregnancy. Concurrent use of iron or calcium and her thyroid meds may result in decreased effectiveness of her meds.   Pregnant women with a BMI>30 ideally should aim for maximum weight gain of 11-20 pounds ideally via healthy diet and regular exercise. Maternal BMI above 30 in pregnancy confers increased risks of preeclampsia, GDM, cardiac dysfunction, sleep apnea,  delivery, and VTE, and fetal risks include miscarriage, fetal anomalies (along with reduced detection), and stillbirth, macrosomia and impaired growth. Growth assessment is advised in the third trimester. For women with prepregnancy BMI greater than 35, we recommend weekly antepartum fetal surveillance beginning at 37 weeks to continue until delivery.  She has an order slip for an early diabetes screen.   Baby aspirin 162 milligrams orally taken till 36w0d has been associated with a lower risk of developing preeclampsia in pregnancy.   BP today was elevated. She reports no history of hypertension outside of pregnancy. She has a cuff at home and reported to nursing that her cuff read a normal value < 140/90 this am.  Baseline preeclamptic labs have been ordered to be completed through her ob office. Patients with an elevated bp prior to 20 weeks of pregnancy may be developing signs of chronic hypertension. The goal blood pressure for pregnancy is <140/90. Medical management for any further consistently elevated bps is recommended. Antepartum fetal surveillance for chronic hypertension would only be indicated if medical management of hypertension is necessary.   Delivery timing is based on BP control and use of medications and should be considered in accordance with ACOG recommendations.    Future tests recommended:  The results of her NIPT will return in 7-10 days.  Screening for spina bifida with an MSAFP screen is a future test that can be prescribed through her OB office.  This blood work should be drawn preferably at 16 to 18 weeks so that the results return prior to her next scan.  The test though can be run until 21 weeks and 6 days if needed.    Future ultrasounds ordered today:   Fetal Level II ultrasound imaging is recommended at 19-20 weeks' gestation.    Pre visit time reviewing her records   5 minutes  Face to face time 12 minutes  Post visit time on documentation of note, updating her problem list, adding orders and prescriptions 25 minutes.  Procedures that were completed today were charged separately.   The level of decision making was low complexity.    Yasmine Verma MD

## 2024-01-20 ENCOUNTER — APPOINTMENT (OUTPATIENT)
Dept: LAB | Facility: HOSPITAL | Age: 34
End: 2024-01-20
Payer: COMMERCIAL

## 2024-01-20 DIAGNOSIS — O09.521 MATERNAL AGE 35+, MULTIGRAVIDA, FIRST TRIMESTER: ICD-10-CM

## 2024-01-20 DIAGNOSIS — Z34.81 PRENATAL CARE, SUBSEQUENT PREGNANCY, FIRST TRIMESTER: ICD-10-CM

## 2024-01-20 PROBLEM — O99.211 OBESITY DURING PREGNANCY IN FIRST TRIMESTER: Status: ACTIVE | Noted: 2024-01-20

## 2024-01-20 PROBLEM — E03.9 HYPOTHYROID IN PREGNANCY, ANTEPARTUM, FIRST TRIMESTER: Status: ACTIVE | Noted: 2024-01-20

## 2024-01-20 PROBLEM — R03.0 ELEVATED BLOOD PRESSURE READING IN OFFICE WITHOUT DIAGNOSIS OF HYPERTENSION: Status: ACTIVE | Noted: 2024-01-20

## 2024-01-20 PROBLEM — Z87.59 HISTORY OF GESTATIONAL HYPERTENSION: Status: ACTIVE | Noted: 2024-01-20

## 2024-01-20 PROBLEM — O99.281 HYPOTHYROID IN PREGNANCY, ANTEPARTUM, FIRST TRIMESTER: Status: ACTIVE | Noted: 2024-01-20

## 2024-01-20 LAB
ABO GROUP BLD: NORMAL
ALBUMIN SERPL BCP-MCNC: 3.9 G/DL (ref 3.5–5)
ALP SERPL-CCNC: 67 U/L (ref 34–104)
ALT SERPL W P-5'-P-CCNC: 14 U/L (ref 7–52)
ANION GAP SERPL CALCULATED.3IONS-SCNC: 8 MMOL/L
AST SERPL W P-5'-P-CCNC: 13 U/L (ref 13–39)
BACTERIA UR QL AUTO: ABNORMAL /HPF
BASOPHILS # BLD AUTO: 0.04 THOUSANDS/ÂΜL (ref 0–0.1)
BASOPHILS NFR BLD AUTO: 1 % (ref 0–1)
BILIRUB SERPL-MCNC: 0.45 MG/DL (ref 0.2–1)
BILIRUB UR QL STRIP: NEGATIVE
BLD GP AB SCN SERPL QL: NEGATIVE
BUN SERPL-MCNC: 8 MG/DL (ref 5–25)
CALCIUM SERPL-MCNC: 9.1 MG/DL (ref 8.4–10.2)
CHLORIDE SERPL-SCNC: 107 MMOL/L (ref 96–108)
CLARITY UR: ABNORMAL
CO2 SERPL-SCNC: 20 MMOL/L (ref 21–32)
COLOR UR: COLORLESS
CREAT SERPL-MCNC: 0.45 MG/DL (ref 0.6–1.3)
CREAT UR-MCNC: 29.5 MG/DL
EOSINOPHIL # BLD AUTO: 0.21 THOUSAND/ÂΜL (ref 0–0.61)
EOSINOPHIL NFR BLD AUTO: 3 % (ref 0–6)
ERYTHROCYTE [DISTWIDTH] IN BLOOD BY AUTOMATED COUNT: 13.1 % (ref 11.6–15.1)
GFR SERPL CREATININE-BSD FRML MDRD: 130 ML/MIN/1.73SQ M
GLUCOSE 1H P 50 G GLC PO SERPL-MCNC: 106 MG/DL (ref 40–134)
GLUCOSE P FAST SERPL-MCNC: 86 MG/DL (ref 65–99)
GLUCOSE UR STRIP-MCNC: NEGATIVE MG/DL
HBV SURFACE AG SER QL: NORMAL
HCT VFR BLD AUTO: 37.8 % (ref 34.8–46.1)
HCV AB SER QL: NORMAL
HGB BLD-MCNC: 12.7 G/DL (ref 11.5–15.4)
HGB UR QL STRIP.AUTO: NEGATIVE
HIV 1+2 AB+HIV1 P24 AG SERPL QL IA: NORMAL
HIV 2 AB SERPL QL IA: NORMAL
HIV1 AB SERPL QL IA: NORMAL
HIV1 P24 AG SERPL QL IA: NORMAL
IMM GRANULOCYTES # BLD AUTO: 0.05 THOUSAND/UL (ref 0–0.2)
IMM GRANULOCYTES NFR BLD AUTO: 1 % (ref 0–2)
KETONES UR STRIP-MCNC: NEGATIVE MG/DL
LEUKOCYTE ESTERASE UR QL STRIP: NEGATIVE
LYMPHOCYTES # BLD AUTO: 1.3 THOUSANDS/ÂΜL (ref 0.6–4.47)
LYMPHOCYTES NFR BLD AUTO: 15 % (ref 14–44)
MCH RBC QN AUTO: 29.5 PG (ref 26.8–34.3)
MCHC RBC AUTO-ENTMCNC: 33.6 G/DL (ref 31.4–37.4)
MCV RBC AUTO: 88 FL (ref 82–98)
MONOCYTES # BLD AUTO: 0.5 THOUSAND/ÂΜL (ref 0.17–1.22)
MONOCYTES NFR BLD AUTO: 6 % (ref 4–12)
NEUTROPHILS # BLD AUTO: 6.39 THOUSANDS/ÂΜL (ref 1.85–7.62)
NEUTS SEG NFR BLD AUTO: 74 % (ref 43–75)
NITRITE UR QL STRIP: NEGATIVE
NON-SQ EPI CELLS URNS QL MICRO: ABNORMAL /HPF
NRBC BLD AUTO-RTO: 0 /100 WBCS
PH UR STRIP.AUTO: 6.5 [PH]
PLATELET # BLD AUTO: 256 THOUSANDS/UL (ref 149–390)
PMV BLD AUTO: 9.7 FL (ref 8.9–12.7)
POTASSIUM SERPL-SCNC: 3.6 MMOL/L (ref 3.5–5.3)
PROT SERPL-MCNC: 6.9 G/DL (ref 6.4–8.4)
PROT UR STRIP-MCNC: NEGATIVE MG/DL
PROT UR-MCNC: <4 MG/DL
RBC # BLD AUTO: 4.31 MILLION/UL (ref 3.81–5.12)
RBC #/AREA URNS AUTO: ABNORMAL /HPF
RH BLD: POSITIVE
RUBV IGG SERPL IA-ACNC: 143.5 IU/ML
SODIUM SERPL-SCNC: 135 MMOL/L (ref 135–147)
SP GR UR STRIP.AUTO: 1 (ref 1–1.03)
SPECIMEN EXPIRATION DATE: NORMAL
TREPONEMA PALLIDUM IGG+IGM AB [PRESENCE] IN SERUM OR PLASMA BY IMMUNOASSAY: NORMAL
TSH SERPL DL<=0.05 MIU/L-ACNC: 1.46 UIU/ML (ref 0.45–4.5)
URATE SERPL-MCNC: 3.8 MG/DL (ref 2–7.5)
UROBILINOGEN UR STRIP-ACNC: <2 MG/DL
WBC # BLD AUTO: 8.49 THOUSAND/UL (ref 4.31–10.16)
WBC #/AREA URNS AUTO: ABNORMAL /HPF

## 2024-01-20 PROCEDURE — 82950 GLUCOSE TEST: CPT

## 2024-01-20 PROCEDURE — 86850 RBC ANTIBODY SCREEN: CPT

## 2024-01-20 PROCEDURE — 82570 ASSAY OF URINE CREATININE: CPT

## 2024-01-20 PROCEDURE — 86900 BLOOD TYPING SEROLOGIC ABO: CPT

## 2024-01-20 PROCEDURE — 86762 RUBELLA ANTIBODY: CPT

## 2024-01-20 PROCEDURE — 86901 BLOOD TYPING SEROLOGIC RH(D): CPT

## 2024-01-20 PROCEDURE — 84156 ASSAY OF PROTEIN URINE: CPT

## 2024-01-20 PROCEDURE — 86780 TREPONEMA PALLIDUM: CPT

## 2024-01-20 PROCEDURE — 87389 HIV-1 AG W/HIV-1&-2 AB AG IA: CPT

## 2024-01-20 PROCEDURE — 84443 ASSAY THYROID STIM HORMONE: CPT

## 2024-01-20 PROCEDURE — 36415 COLL VENOUS BLD VENIPUNCTURE: CPT

## 2024-01-20 PROCEDURE — 85025 COMPLETE CBC W/AUTO DIFF WBC: CPT

## 2024-01-20 PROCEDURE — 80053 COMPREHEN METABOLIC PANEL: CPT

## 2024-01-20 PROCEDURE — 84550 ASSAY OF BLOOD/URIC ACID: CPT

## 2024-01-20 PROCEDURE — 87086 URINE CULTURE/COLONY COUNT: CPT

## 2024-01-20 PROCEDURE — 86803 HEPATITIS C AB TEST: CPT

## 2024-01-20 PROCEDURE — 81001 URINALYSIS AUTO W/SCOPE: CPT

## 2024-01-20 PROCEDURE — 87340 HEPATITIS B SURFACE AG IA: CPT

## 2024-01-22 ENCOUNTER — TELEPHONE (OUTPATIENT)
Facility: HOSPITAL | Age: 34
End: 2024-01-22

## 2024-01-22 ENCOUNTER — INITIAL PRENATAL (OUTPATIENT)
Age: 34
End: 2024-01-22
Payer: COMMERCIAL

## 2024-01-22 VITALS
BODY MASS INDEX: 40.15 KG/M2 | HEIGHT: 66 IN | WEIGHT: 249.8 LBS | SYSTOLIC BLOOD PRESSURE: 110 MMHG | DIASTOLIC BLOOD PRESSURE: 70 MMHG | HEART RATE: 104 BPM

## 2024-01-22 DIAGNOSIS — O09.521 MULTIGRAVIDA OF ADVANCED MATERNAL AGE IN FIRST TRIMESTER: ICD-10-CM

## 2024-01-22 DIAGNOSIS — E03.9 HYPOTHYROID IN PREGNANCY, ANTEPARTUM, FIRST TRIMESTER: ICD-10-CM

## 2024-01-22 DIAGNOSIS — Z3A.14 14 WEEKS GESTATION OF PREGNANCY: Primary | ICD-10-CM

## 2024-01-22 DIAGNOSIS — O99.211 OBESITY DURING PREGNANCY IN FIRST TRIMESTER: ICD-10-CM

## 2024-01-22 DIAGNOSIS — Z33.1 PREGNANT STATE, INCIDENTAL: ICD-10-CM

## 2024-01-22 DIAGNOSIS — O99.281 HYPOTHYROID IN PREGNANCY, ANTEPARTUM, FIRST TRIMESTER: ICD-10-CM

## 2024-01-22 DIAGNOSIS — Z87.59 HISTORY OF GESTATIONAL HYPERTENSION: ICD-10-CM

## 2024-01-22 DIAGNOSIS — Z11.3 SCREENING FOR STD (SEXUALLY TRANSMITTED DISEASE): ICD-10-CM

## 2024-01-22 DIAGNOSIS — Z36.9 ANTENATAL SCREENING ENCOUNTER: ICD-10-CM

## 2024-01-22 LAB
BACTERIA UR CULT: NORMAL
SL AMB  POCT GLUCOSE, UA: NORMAL
SL AMB POCT URINE PROTEIN: NORMAL

## 2024-01-22 PROCEDURE — 87591 N.GONORRHOEAE DNA AMP PROB: CPT

## 2024-01-22 PROCEDURE — PNV

## 2024-01-22 PROCEDURE — 81002 URINALYSIS NONAUTO W/O SCOPE: CPT

## 2024-01-22 PROCEDURE — 87491 CHLMYD TRACH DNA AMP PROBE: CPT

## 2024-01-22 NOTE — TELEPHONE ENCOUNTER
Called PT to verify whether or not she wanted to schedule GC to discuss her carrier results and partner testing. PT stated her partner did have carrier screening, and she was not interested in scheduling GC regarding hers or her partners results. Routing to GC so they are aware.

## 2024-01-22 NOTE — PROGRESS NOTES
Prenatal Visit 14w2d    Patient denies any lof, vb or ctx.   Patient urine is neg/neg  Last pap 21 neg/neg  Patient has no concerns today.

## 2024-01-22 NOTE — ASSESSMENT & PLAN NOTE
"PN1.  Rachel is a 34 y.o. year old  at 14w2d who presents for initial prenatal visit. Unplanned pregnancy but very welcomed pregnancy. Patient was establishing with Jovany Calderón for another round of IUI when they conceived spontaneously. Her LMP was in July. H/o PCOS and stopped her metformin recently.   Feeling well. Her daughter is 2 years old. Previously delivered with SLOGA. She was induced at 37 weeks for gHTN. We discussed LDA for preE prevention. Patient is currently seeing hematology and will inquire with them about starting the aspirin.    LMP: No LMP recorded (lmp unknown). Patient is pregnant.    Gestational age 14w2d based on LMP No, consistent with early US Yes    4  Para 1021           Previous C Section: No    PMHx significant for PCOS, obesity, gHTN, hypothyroidism, and AMA.   Her obstetrical, medical, surgical and family history was reviewed.  Prepregnancy BMI: 39.89.     Vitals:    24 1319   BP: 110/70   BP Location: Left arm   Patient Position: Sitting   Pulse: 104   Weight: 113 kg (249 lb 12.8 oz)   Height: 5' 6\" (1.676 m)     Physical Exam   Constitutional: Alert. Normal appearance. No acute distress.   HEENT: Atraumatic, Normocephalic, Conjunctivae clear  Heart: pulses regular  Lungs: Effort normal.  Abdomen: soft, nontender, gravid uterus  Musculoskeletal: Normal ROM  Skin: warm and dry.   Psychological: Normal mood, thought content, behavior & judgement     She has nausea w/o vomiting  She has had no vaginal bleeding  Patients has no complaints  Denies pelvic pain or cramping.     NT scan on . She is planning to complete genetic screening.    Allergies: Co q10 [ubidecarenone]  Medication use :   Current Outpatient Medications   Medication Sig Dispense Refill    levothyroxine 50 mcg tablet TAKE 1 TABLET BY MOUTH EVERY DAY 30 tablet 5    Prenatal Vit-Fe Fumarate-FA (PRENATAL VITAMIN AND MINERAL PO)       aspirin (ECOTRIN LOW STRENGTH) 81 mg EC tablet Take 162 mg by mouth " daily       No current facility-administered medications for this visit.     She is a non-smoker    Prenatal Labs   A positive  Antibody negative  CBC wnl  Hep B nonreactive  HIV nonreactive  RPR nonreactive  Rubella Immune  GCCT collected today   Pap 07/26/2021. Neg/neg A Pap smear was not obtained,    Risk factors for this pregnancy include: prior AdelaTDARSHAN GUERRERO    Patient Education: Patient was counseled regarding diet, exercise, weight gain, foods to avoid, vaccines in pregnancy, aneuploidy screening, travel precautions to include seat belt use and VTE risk reduction.  She has been provided our pregnancy packet which includes pregnancy warning signs,how and when to contact providers, visit intervals, Maternal fetal medicine information, medication recommendations that are safe during pregnancy, dietary suggestions, activity recommendations, breastfeeding information as well as websites for additional information, and delivery location.

## 2024-01-22 NOTE — PROGRESS NOTES
"Problem   14 Weeks Gestation of Pregnancy    Blood Type: A positive. Antibody negative  Pap 2021. Neg/neg   GC/CT -  collected at pn1  PN1 Labs- wnl    28 Week Labs-  COVID vaccine- x 2   Flu vaccine - completed  Blue folder- reviewed     Genetic screening- NIPT pending  AFP- ordered  Level 1- 24  Level 2-     Yellow folder-  TDAP -   Delivery consent-  Breast pump -   Pediatrician -    Perineal massage -  GBS swab -   IOL -     Hypothyroid in Pregnancy, Antepartum, First Trimester     14 weeks gestation of pregnancy  PN1Daniel Ramos is a 34 y.o. year old  at 14w2d who presents for initial prenatal visit. Unplanned pregnancy but very welcomed pregnancy. Patient was establishing with Jovany Calderón for another round of IUI when they conceived spontaneously. Her LMP was in July. H/o PCOS and stopped her metformin recently.   Feeling well. Her daughter is 2 years old. Previously delivered with SLOGA. She was induced at 37 weeks for gHTN. We discussed LDA for preE prevention. Patient is currently seeing hematology and will inquire with them about starting the aspirin.    LMP: No LMP recorded (lmp unknown). Patient is pregnant.    Gestational age 14w2d based on LMP No, consistent with early US Yes    4  Para 1021           Previous C Section: No    PMHx significant for PCOS, obesity, gHTN, hypothyroidism, and AMA.   Her obstetrical, medical, surgical and family history was reviewed.  Prepregnancy BMI: 39.89.     Vitals:    24 1319   BP: 110/70   BP Location: Left arm   Patient Position: Sitting   Pulse: 104   Weight: 113 kg (249 lb 12.8 oz)   Height: 5' 6\" (1.676 m)     Physical Exam   Constitutional: Alert. Normal appearance. No acute distress.   HEENT: Atraumatic, Normocephalic, Conjunctivae clear  Heart: pulses regular  Lungs: Effort normal.  Abdomen: soft, nontender, gravid uterus  Musculoskeletal: Normal ROM  Skin: warm and dry.   Psychological: Normal mood, thought content, behavior & " judgement     She has nausea w/o vomiting  She has had no vaginal bleeding  Patients has no complaints  Denies pelvic pain or cramping.     NT scan on 1/19. She is planning to complete genetic screening.    Allergies: Co q10 [ubidecarenone]  Medication use :   Current Outpatient Medications   Medication Sig Dispense Refill    levothyroxine 50 mcg tablet TAKE 1 TABLET BY MOUTH EVERY DAY 30 tablet 5    Prenatal Vit-Fe Fumarate-FA (PRENATAL VITAMIN AND MINERAL PO)       aspirin (ECOTRIN LOW STRENGTH) 81 mg EC tablet Take 162 mg by mouth daily       No current facility-administered medications for this visit.     She is a non-smoker    Prenatal Labs   A positive  Antibody negative  CBC wnl  Hep B nonreactive  HIV nonreactive  RPR nonreactive  Rubella Immune  GCCT collected today   Pap 07/26/2021. Neg/neg A Pap smear was not obtained,    Risk factors for this pregnancy include: prior AdelaTNDARSHAN    Patient Education: Patient was counseled regarding diet, exercise, weight gain, foods to avoid, vaccines in pregnancy, aneuploidy screening, travel precautions to include seat belt use and VTE risk reduction.  She has been provided our pregnancy packet which includes pregnancy warning signs,how and when to contact providers, visit intervals, Maternal fetal medicine information, medication recommendations that are safe during pregnancy, dietary suggestions, activity recommendations, breastfeeding information as well as websites for additional information, and delivery location.    Hypothyroid in pregnancy, antepartum, first trimester  1/20/24 TSH 1.459, taking levothyroxine 50 mcg

## 2024-01-22 NOTE — PATIENT INSTRUCTIONS
Pregnancy at 15 to 18 Weeks   AMBULATORY CARE:   What changes are happening to your body:  Now that you are in your second trimester, you have more energy. You may also feel hungrier than usual. You may start to experience other symptoms, such as heartburn or dizziness. You may be gaining about ½ to 1 pound a week, and your pregnancy is beginning to show. You may need to start wearing maternity clothes.  Seek care immediately if:   You have pain or cramping in your abdomen or low back.    You have heavy vaginal bleeding or clotting.    You pass material that looks like tissue or large clots. Collect the material and bring it with you.    Call your doctor or obstetrician if:   You cannot keep food or drinks down, and you are losing weight.    You have light bleeding.    You have chills or a fever.    You have vaginal itching, burning, or pain.    You have yellow, green, white, or foul-smelling vaginal discharge.    You have pain or burning when you urinate, less urine than usual, or pink or bloody urine.    You have questions or concerns about your condition or care.    How to care for yourself at this stage of your pregnancy:       Manage heartburn  by eating 4 or 5 small meals each day instead of large meals. Avoid spicy foods. Avoid eating right before bedtime.         Manage nausea and vomiting.  Avoid fatty and spicy foods. Eat small meals throughout the day instead of large meals. Vicky may help to decrease nausea. Ask your healthcare provider about other ways of decreasing nausea and vomiting.    Eat a variety of healthy foods.  Healthy foods include fruits, vegetables, whole-grain breads, low-fat dairy foods, beans, lean meats, and fish. Drink liquids as directed. Ask how much liquid to drink each day and which liquids are best for you. Limit caffeine to less than 200 milligrams each day. Limit your intake of fish to 2 servings each week. Choose fish low in mercury such as canned light tuna, shrimp, salmon,  cod, or tilapia. Do not  eat fish high in mercury such as swordfish, tilefish, roland mackerel, and shark.         Take prenatal vitamins as directed.  Your need for certain vitamins and minerals, such as folic acid, increases during pregnancy. Prenatal vitamins provide some of the extra vitamins and minerals you need. Prenatal vitamins may also help to decrease the risk of certain birth defects.         Do not smoke.  Smoking increases your risk of a miscarriage and other health problems during your pregnancy. Smoking can cause your baby to be born too early or weigh less at birth. Ask your healthcare provider for information if you need help quitting.    Do not drink alcohol.  Alcohol passes from your body to your baby through the placenta. It can affect your baby's brain development and cause fetal alcohol syndrome (FAS). FAS is a group of conditions that causes mental, behavior, and growth problems.    Talk to your healthcare provider before you take any medicines.  Many medicines may harm your baby if you take them when you are pregnant. Do not take any medicines, vitamins, herbs, or supplements without first talking to your healthcare provider. Never use illegal or street drugs (such as marijuana or cocaine) while you are pregnant.  Safety tips during pregnancy:   Avoid hot tubs and saunas.  Do not use a hot tub or sauna while you are pregnant, especially during your first trimester. Hot tubs and saunas may raise your baby's temperature and increase the risk of birth defects.    Avoid toxoplasmosis.  This is an infection caused by eating raw meat or being around infected cat feces. It can cause birth defects, miscarriages, and other problems. Wash your hands after you touch raw meat. Make sure any meat is well-cooked before you eat it. Avoid raw eggs and unpasteurized milk. Use gloves or ask someone else to clean your cat's litter box while you are pregnant.    Changes that are happening with your baby:  By 18  weeks, your baby may be about 6 inches long from the top of the head to the rump (baby's bottom). Your baby may weigh about 11 ounces. You may be able to feel your baby's movement at about 18 weeks or later. The first movements may not be that noticeable. They may feel like a fluttering sensation. Your baby also makes sucking movements and can hear certain sounds.  What you need to know about prenatal care:  During the first 28 weeks of your pregnancy, you will see your healthcare provider once a month. Your healthcare provider will check your blood pressure and weight. You may also need any of the following:  A urine test  may also be done to check for sugar and protein. These can be signs of gestational diabetes or infection.    A blood test  may be done to check for anemia (low iron level).    Fundal height check  is a measurement of your uterus to check your baby's growth. This number is usually the same as the number of weeks that you have been pregnant.    An ultrasound  may be done to check your baby's development. Your healthcare provider may be able to tell you what your baby's gender is during the ultrasound.         Your baby's heart rate  will be checked.    © Copyright [employer] 2023 Information is for End User's use only and may not be sold, redistributed or otherwise used for commercial purposes.  The above information is an  only. It is not intended as medical advice for individual conditions or treatments. Talk to your doctor, nurse or pharmacist before following any medical regimen to see if it is safe and effective for you.  Round Ligament Pain   WHAT YOU NEED TO KNOW:   What is round ligament pain?  Round ligament pain is caused when ligaments are stretched as your uterus (womb) gets bigger during pregnancy. Round ligaments are found on each side of your uterus. They are bands of tissue that hold the uterus in place. Round ligament pain happens most often during the second  trimester. It is a normal part of pregnancy and should stop by the third trimester. The pain is not serious and will not hurt your baby.       What are the signs and symptoms of round ligament pain?   Pain on one or both sides of your lower abdomen or groin that may move up to your hip    Spasms in the muscles in your abdomen    Pain that lasts a few seconds    Pain that happens when you exercise, sneeze, change positions, or stand quickly    How is round ligament pain diagnosed?  Your healthcare provider will examine you and ask about your pain. Tell the provider when the pain started, and if you feel it on one or both sides. Your provider may ask if anything helps the pain or makes it worse.   What can I do to manage my pain?  Round ligament pain does not need to be treated. The following may help make you more comfortable:  Rest as often as you can.  Rest can help relieve round ligament pain. You might want to lie on the side that has pain. Place a pillow under your abdomen. Keep another pillow between your knees.    Move slowly.  Sudden movement can stretch the ligaments and cause pain. Stand, sit, and change positions slowly. Try to tighten the muscles in your hips before you sneeze or laugh. You can also sit down and bring your knees up toward your abdomen. This can help relieve tension on the ligaments.    Exercise as directed.  Gentle exercise can keep the ligaments loose and strengthen core (abdominal) muscles. An example is swimming, or a yoga program designed for pregnancy. Ask your healthcare provider which exercises are safe for you and how often to exercise. For most healthy women, a good goal is to try to get at least 30 minutes of exercise every day. If activity causes pain, try not to walk too long or too far at one time. Break your exercise up into short amounts.    Apply a warm compress to the area.  Warmth can relieve pain and muscle spasms. Ask your healthcare provider if you can take a warm  bath or use a heating pad. Keep all heat settings low. High heat can be dangerous for your baby. Do not sit in a hot tub or use hot water in your bath. You may also be able to massage the area gently while you are applying heat. Massage can help relieve pain.    Ask about pain medicines.  Ask your healthcare provider before you take any medicine during pregnancy, including over-the-counter pain medicines. Your healthcare provider may recommend acetaminophen to relieve the pain. Ask how much to take and how often to take it. Follow directions. Acetaminophen can cause liver damage. Too much medicine can be harmful to your baby.    When should I contact my healthcare provider?   You have pain that is spreading to other parts of your body.    You have new or worsening pain.    You have pain that lasts longer than a few minutes at a time.    You have questions or concerns about your condition or care.    CARE AGREEMENT:   You have the right to help plan your care. Learn about your health condition and how it may be treated. Discuss treatment options with your healthcare providers to decide what care you want to receive. You always have the right to refuse treatment. The above information is an  only. It is not intended as medical advice for individual conditions or treatments. Talk to your doctor, nurse or pharmacist before following any medical regimen to see if it is safe and effective for you.  © Copyright [employer] 2023 Information is for End User's use only and may not be sold, redistributed or otherwise used for commercial purposes.  Valuable Online Resource:    [facility] pregnancy essential guide    https://www.[facility].org/womens/obstetrics/pregnancy-essentials-guide      On the right side of the screen is a 50 page guide providing valuable information about your entire pregnancy.    On the left hand side of the site you will see several other links to great information and resources that [facility]  "offers     If you click on the tab that says \"Pregnancy and Birth Packet\" this opens another  guide to labor and delivery information as well as breast feeding information,  care, pediatricians, car seat safety and much more     The [facility] tab has a virtual tour of the new L&D unit, as well as valuable information about classes that are offered, breast feeding support, support groups and much more.     I highly recommend the virtual Breast Feeding class, very informational even if you have breast fed in the past. Check for available dates !    Click around and enjoy all we have to offer!    Please note that all information in regards to locations and visiting hours have not been updated due to COVID    Your delivery location is [facility] @ [address] [facility] [place] [address]   "

## 2024-01-23 LAB
C TRACH DNA SPEC QL NAA+PROBE: NEGATIVE
N GONORRHOEA DNA SPEC QL NAA+PROBE: NEGATIVE

## 2024-01-24 LAB — MISCELLANEOUS LAB TEST RESULT: NORMAL

## 2024-02-07 ENCOUNTER — APPOINTMENT (OUTPATIENT)
Dept: LAB | Facility: HOSPITAL | Age: 34
End: 2024-02-07
Payer: COMMERCIAL

## 2024-02-07 DIAGNOSIS — Z33.1 PREGNANT STATE, INCIDENTAL: ICD-10-CM

## 2024-02-07 DIAGNOSIS — Z36.9 ANTENATAL SCREENING ENCOUNTER: ICD-10-CM

## 2024-02-07 PROCEDURE — 36415 COLL VENOUS BLD VENIPUNCTURE: CPT

## 2024-02-07 PROCEDURE — 82105 ALPHA-FETOPROTEIN SERUM: CPT

## 2024-02-09 LAB
2ND TRIMESTER 4 SCREEN SERPL-IMP: NORMAL
AFP ADJ MOM SERPL: 0.85
AFP INTERP AMN-IMP: NORMAL
AFP INTERP SERPL-IMP: NORMAL
AFP INTERP SERPL-IMP: NORMAL
AFP SERPL-MCNC: 23.4 NG/ML
AGE AT DELIVERY: 35.5 YR
GA METHOD: NORMAL
GA: 16.7 WEEKS
IDDM PATIENT QL: NO
MULTIPLE PREGNANCY: NO
NEURAL TUBE DEFECT RISK FETUS: NORMAL %

## 2024-02-19 PROBLEM — O09.522 MULTIGRAVIDA OF ADVANCED MATERNAL AGE IN SECOND TRIMESTER: Status: ACTIVE | Noted: 2024-01-20

## 2024-02-19 PROBLEM — Z3A.18 18 WEEKS GESTATION OF PREGNANCY: Status: ACTIVE | Noted: 2024-01-22

## 2024-02-19 PROBLEM — O99.282 HYPOTHYROIDISM DURING PREGNANCY IN SECOND TRIMESTER: Status: ACTIVE | Noted: 2024-01-20

## 2024-02-19 PROBLEM — O99.212 OBESITY AFFECTING PREGNANCY IN SECOND TRIMESTER: Status: ACTIVE | Noted: 2024-01-20

## 2024-02-19 NOTE — PATIENT INSTRUCTIONS
Pregnancy at 15 to 18 Weeks   AMBULATORY CARE:   What changes are happening to your body:  Now that you are in your second trimester, you have more energy. You may also feel hungrier than usual. You may start to experience other symptoms, such as heartburn or dizziness. You may be gaining about ½ to 1 pound a week, and your pregnancy is beginning to show. You may need to start wearing maternity clothes.  Seek care immediately if:   You have pain or cramping in your abdomen or low back.    You have heavy vaginal bleeding or clotting.    You pass material that looks like tissue or large clots. Collect the material and bring it with you.    Call your doctor or obstetrician if:   You cannot keep food or drinks down, and you are losing weight.    You have light bleeding.    You have chills or a fever.    You have vaginal itching, burning, or pain.    You have yellow, green, white, or foul-smelling vaginal discharge.    You have pain or burning when you urinate, less urine than usual, or pink or bloody urine.    You have questions or concerns about your condition or care.    How to care for yourself at this stage of your pregnancy:       Manage heartburn  by eating 4 or 5 small meals each day instead of large meals. Avoid spicy foods. Avoid eating right before bedtime.         Manage nausea and vomiting.  Avoid fatty and spicy foods. Eat small meals throughout the day instead of large meals. Vicky may help to decrease nausea. Ask your healthcare provider about other ways of decreasing nausea and vomiting.    Eat a variety of healthy foods.  Healthy foods include fruits, vegetables, whole-grain breads, low-fat dairy foods, beans, lean meats, and fish. Drink liquids as directed. Ask how much liquid to drink each day and which liquids are best for you. Limit caffeine to less than 200 milligrams each day. Limit your intake of fish to 2 servings each week. Choose fish low in mercury such as canned light tuna, shrimp, salmon,  cod, or tilapia. Do not  eat fish high in mercury such as swordfish, tilefish, roland mackerel, and shark.         Take prenatal vitamins as directed.  Your need for certain vitamins and minerals, such as folic acid, increases during pregnancy. Prenatal vitamins provide some of the extra vitamins and minerals you need. Prenatal vitamins may also help to decrease the risk of certain birth defects.         Do not smoke.  Smoking increases your risk of a miscarriage and other health problems during your pregnancy. Smoking can cause your baby to be born too early or weigh less at birth. Ask your healthcare provider for information if you need help quitting.    Do not drink alcohol.  Alcohol passes from your body to your baby through the placenta. It can affect your baby's brain development and cause fetal alcohol syndrome (FAS). FAS is a group of conditions that causes mental, behavior, and growth problems.    Talk to your healthcare provider before you take any medicines.  Many medicines may harm your baby if you take them when you are pregnant. Do not take any medicines, vitamins, herbs, or supplements without first talking to your healthcare provider. Never use illegal or street drugs (such as marijuana or cocaine) while you are pregnant.  Safety tips during pregnancy:   Avoid hot tubs and saunas.  Do not use a hot tub or sauna while you are pregnant, especially during your first trimester. Hot tubs and saunas may raise your baby's temperature and increase the risk of birth defects.    Avoid toxoplasmosis.  This is an infection caused by eating raw meat or being around infected cat feces. It can cause birth defects, miscarriages, and other problems. Wash your hands after you touch raw meat. Make sure any meat is well-cooked before you eat it. Avoid raw eggs and unpasteurized milk. Use gloves or ask someone else to clean your cat's litter box while you are pregnant.    Changes that are happening with your baby:  By 18  weeks, your baby may be about 6 inches long from the top of the head to the rump (baby's bottom). Your baby may weigh about 11 ounces. You may be able to feel your baby's movement at about 18 weeks or later. The first movements may not be that noticeable. They may feel like a fluttering sensation. Your baby also makes sucking movements and can hear certain sounds.  What you need to know about prenatal care:  During the first 28 weeks of your pregnancy, you will see your healthcare provider once a month. Your healthcare provider will check your blood pressure and weight. You may also need any of the following:  A urine test  may also be done to check for sugar and protein. These can be signs of gestational diabetes or infection.    A blood test  may be done to check for anemia (low iron level).    Fundal height check  is a measurement of your uterus to check your baby's growth. This number is usually the same as the number of weeks that you have been pregnant.    An ultrasound  may be done to check your baby's development. Your healthcare provider may be able to tell you what your baby's gender is during the ultrasound.         Your baby's heart rate  will be checked.    © Copyright [employer] 2023 Information is for End User's use only and may not be sold, redistributed or otherwise used for commercial purposes.  The above information is an  only. It is not intended as medical advice for individual conditions or treatments. Talk to your doctor, nurse or pharmacist before following any medical regimen to see if it is safe and effective for you.

## 2024-02-20 ENCOUNTER — ROUTINE PRENATAL (OUTPATIENT)
Age: 34
End: 2024-02-20
Payer: COMMERCIAL

## 2024-02-20 VITALS
WEIGHT: 239 LBS | BODY MASS INDEX: 38.58 KG/M2 | SYSTOLIC BLOOD PRESSURE: 114 MMHG | HEART RATE: 99 BPM | DIASTOLIC BLOOD PRESSURE: 84 MMHG

## 2024-02-20 DIAGNOSIS — O99.212 OBESITY AFFECTING PREGNANCY IN SECOND TRIMESTER, UNSPECIFIED OBESITY TYPE: ICD-10-CM

## 2024-02-20 DIAGNOSIS — Z3A.18 18 WEEKS GESTATION OF PREGNANCY: ICD-10-CM

## 2024-02-20 DIAGNOSIS — D68.61 APL (ANTIPHOSPHOLIPID SYNDROME) (HCC): ICD-10-CM

## 2024-02-20 DIAGNOSIS — Z34.82 ENCOUNTER FOR SUPERVISION OF OTHER NORMAL PREGNANCY, SECOND TRIMESTER: Primary | ICD-10-CM

## 2024-02-20 DIAGNOSIS — O09.522 MULTIGRAVIDA OF ADVANCED MATERNAL AGE IN SECOND TRIMESTER: ICD-10-CM

## 2024-02-20 DIAGNOSIS — E03.9 HYPOTHYROIDISM DURING PREGNANCY IN SECOND TRIMESTER: ICD-10-CM

## 2024-02-20 DIAGNOSIS — Z87.59 HISTORY OF GESTATIONAL HYPERTENSION: ICD-10-CM

## 2024-02-20 DIAGNOSIS — O99.282 HYPOTHYROIDISM DURING PREGNANCY IN SECOND TRIMESTER: ICD-10-CM

## 2024-02-20 LAB
SL AMB POCT COLOR,UA: NORMAL
SL AMB POCT URINE PROTEIN: NORMAL

## 2024-02-20 PROCEDURE — 81002 URINALYSIS NONAUTO W/O SCOPE: CPT | Performed by: NURSE PRACTITIONER

## 2024-02-20 PROCEDURE — PNV: Performed by: NURSE PRACTITIONER

## 2024-02-20 NOTE — PROGRESS NOTES
Problem   Apl (Antiphospholipid Syndrome) (Hcc)   18 Weeks Gestation of Pregnancy    Blood Type: A positive. Antibody negative  Pap 07/26/2021. Neg/neg   GC/CT -  neg  PN1 Labs- wnl    28 Week Labs-  COVID vaccine- x 2   Flu vaccine - completed  Blue folder- reviewed     Genetic screening- NIPT low  AFP- neg  Level 1- 1/19/24  Level 2- 3/12    Yellow folder-  TDAP -   Delivery consent-  Breast pump -   Pediatrician -    Perineal massage -  GBS swab -   IOL -       18 weeks gestation of pregnancy  Feels well.  She is concerned about some extra discharge which she has noticed for the past month.  She denies CTX/VB. Ph 4, normal physiologic appearance of her discharge today. Negative fluid noted on Vasalva. Discussed fetal awareness.  She will be seeing hematology on March 12 for antiphospholipid syndrome. She voiced no other concerns.

## 2024-02-20 NOTE — PROGRESS NOTES
Feels some movement not sure if she has LOF or just water discharge. Says if its just water when she looks at her undies. No  vaginal bleeding. Received her Flu vaccine in November with DESHAWN hernández.

## 2024-02-20 NOTE — ASSESSMENT & PLAN NOTE
Feels well.  She is concerned about some extra discharge which she has noticed for the past month.  She denies CTX/VB. Ph 4, normal physiologic appearance of her discharge today. Negative fluid noted on Vasalva. Discussed fetal awareness.  She will be seeing hematology on March 12 for antiphospholipid syndrome. She voiced no other concerns.

## 2024-02-27 ENCOUNTER — TELEPHONE (OUTPATIENT)
Dept: HEMATOLOGY ONCOLOGY | Facility: CLINIC | Age: 34
End: 2024-02-27

## 2024-02-27 DIAGNOSIS — R76.0 LUPUS ANTICOAGULANT POSITIVE: Primary | ICD-10-CM

## 2024-02-27 NOTE — TELEPHONE ENCOUNTER
Left message for patient to go for an updated cbc if possible before appointment with Dr. Bennett. Order placed.

## 2024-02-28 ENCOUNTER — APPOINTMENT (OUTPATIENT)
Dept: LAB | Facility: HOSPITAL | Age: 34
End: 2024-02-28
Attending: INTERNAL MEDICINE
Payer: COMMERCIAL

## 2024-02-28 DIAGNOSIS — R76.0 LUPUS ANTICOAGULANT POSITIVE: ICD-10-CM

## 2024-02-28 LAB
BASOPHILS # BLD AUTO: 0.03 THOUSANDS/ÂΜL (ref 0–0.1)
BASOPHILS NFR BLD AUTO: 0 % (ref 0–1)
EOSINOPHIL # BLD AUTO: 0.3 THOUSAND/ÂΜL (ref 0–0.61)
EOSINOPHIL NFR BLD AUTO: 3 % (ref 0–6)
ERYTHROCYTE [DISTWIDTH] IN BLOOD BY AUTOMATED COUNT: 13.6 % (ref 11.6–15.1)
HCT VFR BLD AUTO: 36 % (ref 34.8–46.1)
HGB BLD-MCNC: 12.1 G/DL (ref 11.5–15.4)
IMM GRANULOCYTES # BLD AUTO: 0.09 THOUSAND/UL (ref 0–0.2)
IMM GRANULOCYTES NFR BLD AUTO: 1 % (ref 0–2)
LYMPHOCYTES # BLD AUTO: 1.45 THOUSANDS/ÂΜL (ref 0.6–4.47)
LYMPHOCYTES NFR BLD AUTO: 15 % (ref 14–44)
MCH RBC QN AUTO: 30.2 PG (ref 26.8–34.3)
MCHC RBC AUTO-ENTMCNC: 33.6 G/DL (ref 31.4–37.4)
MCV RBC AUTO: 90 FL (ref 82–98)
MONOCYTES # BLD AUTO: 0.64 THOUSAND/ÂΜL (ref 0.17–1.22)
MONOCYTES NFR BLD AUTO: 7 % (ref 4–12)
NEUTROPHILS # BLD AUTO: 7.08 THOUSANDS/ÂΜL (ref 1.85–7.62)
NEUTS SEG NFR BLD AUTO: 74 % (ref 43–75)
NRBC BLD AUTO-RTO: 0 /100 WBCS
PLATELET # BLD AUTO: 255 THOUSANDS/UL (ref 149–390)
PMV BLD AUTO: 9.5 FL (ref 8.9–12.7)
RBC # BLD AUTO: 4.01 MILLION/UL (ref 3.81–5.12)
WBC # BLD AUTO: 9.59 THOUSAND/UL (ref 4.31–10.16)

## 2024-02-28 PROCEDURE — 85025 COMPLETE CBC W/AUTO DIFF WBC: CPT

## 2024-02-28 PROCEDURE — 36415 COLL VENOUS BLD VENIPUNCTURE: CPT

## 2024-02-29 ENCOUNTER — OFFICE VISIT (OUTPATIENT)
Dept: HEMATOLOGY ONCOLOGY | Facility: CLINIC | Age: 34
End: 2024-02-29
Payer: COMMERCIAL

## 2024-02-29 VITALS
DIASTOLIC BLOOD PRESSURE: 78 MMHG | HEIGHT: 66 IN | TEMPERATURE: 98.2 F | OXYGEN SATURATION: 99 % | SYSTOLIC BLOOD PRESSURE: 120 MMHG | RESPIRATION RATE: 17 BRPM | BODY MASS INDEX: 40.98 KG/M2 | HEART RATE: 88 BPM | WEIGHT: 255 LBS

## 2024-02-29 DIAGNOSIS — R76.0 LUPUS ANTICOAGULANT POSITIVE: Primary | ICD-10-CM

## 2024-02-29 PROCEDURE — 99215 OFFICE O/P EST HI 40 MIN: CPT | Performed by: INTERNAL MEDICINE

## 2024-02-29 NOTE — PROGRESS NOTES
[facility]ATOLOG[facility] [place]  [address] [facility] [address]  [facility] [address]  [phone]    Rachel Doe,1990, [id number]  02/29/24    Discussion:   In summary, this is a 34-year-old female with a history of miscarriage x 2.  Currently pregnant at 20 weeks.  April 2023 Lupus anticoagulant proved positive.  Repeat in September 2023 showed no lupus anticoagulant.  Inhibitor was suggested, however.  She was on no anticoagulant at that time.  Subsequent PTT mixing study was normal.  At this time I would favor repeat lupus anticoagulant.    If findings are normal, no further evaluation would be indicated, in my opinion.  If lupus anticoagulant is detected, I would favor use of Lovenox during pregnancy.  Consideration for pros and cons of anticoagulation after delivery.  If inhibitor is suggested, titers would be requested accordingly.  I asked the patient to call 2 or 3 days after she has lupus anticoagulant repeated so that we can review the results and proceed as outlined.  I discussed the above with the patient.  The patient  voiced understanding and agreement.  ______________________________________________________________________    Chief Complaint   Patient presents with    Follow-up       HPI:  Oncology History    No history exists.       Interval History: Clinically stable.  ECOG-  0 - Asymptomatic    Review of Systems   Constitutional:  Negative for appetite change, diaphoresis, fatigue and fever.   HENT:  Negative for sinus pain.    Eyes:  Negative for discharge.   Respiratory:  Negative for cough and shortness of breath.    Cardiovascular:  Negative for chest pain.   Gastrointestinal:  Negative for abdominal pain, constipation and diarrhea.   Endocrine: Negative for cold intolerance.   Genitourinary:  Negative for difficulty urinating and hematuria.   Musculoskeletal:  Negative for joint swelling.   Skin:  Negative for rash.   Allergic/Immunologic: Negative for  "environmental allergies.   Neurological:  Negative for dizziness and headaches.   Hematological:  Negative for adenopathy.   Psychiatric/Behavioral:  Negative for agitation.        Past Medical History:   Diagnosis Date    Female infertility 01/2020    Gestational hypertension     Hypothyroidism     dx 1/2021    PCOS (polycystic ovarian syndrome)      Patient Active Problem List   Diagnosis    PCOS (polycystic ovarian syndrome)    Subclinical hypothyroidism    Elevated blood pressure reading in office without diagnosis of hypertension    History of gestational hypertension    Hypothyroidism during pregnancy in second trimester    Multigravida of advanced maternal age in second trimester    Obesity affecting pregnancy in second trimester    18 weeks gestation of pregnancy    APL (antiphospholipid syndrome) ([facility])       Current Outpatient Medications:     aspirin (ECOTRIN LOW STRENGTH) 81 mg EC tablet, Take 162 mg by mouth daily, Disp: , Rfl:     levothyroxine 50 mcg tablet, TAKE 1 TABLET BY MOUTH EVERY DAY, Disp: 30 tablet, Rfl: 5    Prenatal Vit-Fe Fumarate-FA (PRENATAL VITAMIN AND MINERAL PO), , Disp: , Rfl:   Allergies   Allergen Reactions    Co Q10 [Ubidecarenone] Hives     No past surgical history on file.  Social History     Objective:  Vitals:    02/29/24 1631   BP: 120/78   BP Location: Left arm   Patient Position: Sitting   Cuff Size: Adult   Pulse: 88   Resp: 17   Temp: 98.2 °F (36.8 °C)   SpO2: 99%   Weight: 116 kg (255 lb)   Height: 5' 6\" (1.676 m)     Physical Exam  Constitutional:       Appearance: She is well-developed.   HENT:      Head: Normocephalic and atraumatic.   Eyes:      Pupils: Pupils are equal, round, and reactive to light.   Cardiovascular:      Rate and Rhythm: Normal rate.      Heart sounds: No murmur heard.  Pulmonary:      Effort: No respiratory distress.      Breath sounds: No wheezing or rales.   Abdominal:      General: There is no distension.      Palpations: Abdomen is soft.      " Tenderness: There is no abdominal tenderness. There is no rebound.   Musculoskeletal:         General: No tenderness.      Cervical back: Neck supple.   Lymphadenopathy:      Cervical: No cervical adenopathy.   Skin:     General: Skin is warm.      Findings: No rash.   Neurological:      Mental Status: She is alert and oriented to person, place, and time.      Deep Tendon Reflexes: Reflexes normal.   Psychiatric:         Thought Content: Thought content normal.           Labs:  I personally reviewed the labs and imaging pertinent to this patient care.

## 2024-03-11 ENCOUNTER — APPOINTMENT (OUTPATIENT)
Dept: LAB | Facility: HOSPITAL | Age: 34
End: 2024-03-11
Attending: INTERNAL MEDICINE
Payer: COMMERCIAL

## 2024-03-11 DIAGNOSIS — R76.0 LUPUS ANTICOAGULANT POSITIVE: ICD-10-CM

## 2024-03-11 PROCEDURE — 85732 THROMBOPLASTIN TIME PARTIAL: CPT

## 2024-03-11 PROCEDURE — 85705 THROMBOPLASTIN INHIBITION: CPT

## 2024-03-11 PROCEDURE — 36415 COLL VENOUS BLD VENIPUNCTURE: CPT

## 2024-03-11 PROCEDURE — 85670 THROMBIN TIME PLASMA: CPT

## 2024-03-11 PROCEDURE — 85613 RUSSELL VIPER VENOM DILUTED: CPT

## 2024-03-12 ENCOUNTER — ROUTINE PRENATAL (OUTPATIENT)
Dept: PERINATAL CARE | Facility: OTHER | Age: 34
End: 2024-03-12
Payer: COMMERCIAL

## 2024-03-12 VITALS
HEIGHT: 66 IN | WEIGHT: 257 LBS | BODY MASS INDEX: 41.3 KG/M2 | HEART RATE: 99 BPM | DIASTOLIC BLOOD PRESSURE: 60 MMHG | SYSTOLIC BLOOD PRESSURE: 96 MMHG

## 2024-03-12 DIAGNOSIS — Z36.86 ENCOUNTER FOR ANTENATAL SCREENING FOR CERVICAL LENGTH: ICD-10-CM

## 2024-03-12 DIAGNOSIS — O99.212 OBESITY AFFECTING PREGNANCY IN SECOND TRIMESTER, UNSPECIFIED OBESITY TYPE: ICD-10-CM

## 2024-03-12 DIAGNOSIS — Z87.59 HISTORY OF GESTATIONAL HYPERTENSION: ICD-10-CM

## 2024-03-12 DIAGNOSIS — Z36.3 ENCOUNTER FOR ANTENATAL SCREENING FOR MALFORMATION: Primary | ICD-10-CM

## 2024-03-12 DIAGNOSIS — O09.522 MULTIGRAVIDA OF ADVANCED MATERNAL AGE IN SECOND TRIMESTER: ICD-10-CM

## 2024-03-12 DIAGNOSIS — E03.9 HYPOTHYROIDISM DURING PREGNANCY IN SECOND TRIMESTER: ICD-10-CM

## 2024-03-12 DIAGNOSIS — O99.282 HYPOTHYROIDISM DURING PREGNANCY IN SECOND TRIMESTER: ICD-10-CM

## 2024-03-12 DIAGNOSIS — O35.EXX0 FETAL RENAL ANOMALY, SINGLE GESTATION: ICD-10-CM

## 2024-03-12 DIAGNOSIS — Z3A.21 21 WEEKS GESTATION OF PREGNANCY: ICD-10-CM

## 2024-03-12 PROCEDURE — 76811 OB US DETAILED SNGL FETUS: CPT | Performed by: STUDENT IN AN ORGANIZED HEALTH CARE EDUCATION/TRAINING PROGRAM

## 2024-03-12 PROCEDURE — 99213 OFFICE O/P EST LOW 20 MIN: CPT | Performed by: STUDENT IN AN ORGANIZED HEALTH CARE EDUCATION/TRAINING PROGRAM

## 2024-03-12 PROCEDURE — 76817 TRANSVAGINAL US OBSTETRIC: CPT | Performed by: STUDENT IN AN ORGANIZED HEALTH CARE EDUCATION/TRAINING PROGRAM

## 2024-03-12 NOTE — PROGRESS NOTES
"[facility]: Ms. Doe was seen today for anatomic survey and cervical length screening ultrasound.  See ultrasound report under \"OB Procedures\" tab.      MDM:   I. Diagnoses/Problems addressed:  Fetal UTDA1  II.  Data: I reviewed 4 lab tests ordered by another provider.  III.  Risk of morbidity: Low    Please don't hesitate to contact our office with any concerns or questions.  -Annelise Head MD      "

## 2024-03-12 NOTE — PROGRESS NOTES
Ultrasound Probe Disinfection    A transvaginal ultrasound was performed.   Prior to use, disinfection was performed with High Level Disinfection Process ([employer]).  Probe serial number [id number]: [id number]   was used.      Kristie Holly  03/12/24  8:10 AM

## 2024-03-14 LAB
APTT SCREEN TO CONFIRM RATIO: 1.09 RATIO (ref 0–1.34)
CONFIRM APTT/NORMAL: 33.6 SEC (ref 0–47.6)
LA PPP-IMP: NORMAL
SCREEN APTT: 35.9 SEC (ref 0–43.5)
SCREEN DRVVT: 35 SEC (ref 0–47)
THROMBIN TIME: 15.2 SEC (ref 0–23)

## 2024-03-15 ENCOUNTER — TELEPHONE (OUTPATIENT)
Dept: HEMATOLOGY ONCOLOGY | Facility: CLINIC | Age: 34
End: 2024-03-15

## 2024-03-15 NOTE — TELEPHONE ENCOUNTER
Dr. Bennett reviewed Lupus anticoagulant study.  (Lupus Anticoagulant 35.9).    Since the results were normal, Dr. Bennett does not need to order additional testing.    Attempted to call pt to let her know without success.  Left a VM.

## 2024-03-18 NOTE — PROGRESS NOTES
Pt is here for routine ob visit   No concerns at tis time  Urine neg/neg  No LOF,VB,Contractions  +FM   UTD flu vaccine   AFP completed

## 2024-03-19 ENCOUNTER — ROUTINE PRENATAL (OUTPATIENT)
Age: 34
End: 2024-03-19
Payer: COMMERCIAL

## 2024-03-19 VITALS — DIASTOLIC BLOOD PRESSURE: 86 MMHG | BODY MASS INDEX: 42.06 KG/M2 | WEIGHT: 260.6 LBS | SYSTOLIC BLOOD PRESSURE: 120 MMHG

## 2024-03-19 DIAGNOSIS — D68.61 APL (ANTIPHOSPHOLIPID SYNDROME) (HCC): ICD-10-CM

## 2024-03-19 DIAGNOSIS — E03.9 HYPOTHYROIDISM DURING PREGNANCY IN SECOND TRIMESTER: ICD-10-CM

## 2024-03-19 DIAGNOSIS — Z3A.22 22 WEEKS GESTATION OF PREGNANCY: ICD-10-CM

## 2024-03-19 DIAGNOSIS — O99.282 HYPOTHYROIDISM DURING PREGNANCY IN SECOND TRIMESTER: ICD-10-CM

## 2024-03-19 DIAGNOSIS — Z34.82 ENCOUNTER FOR SUPERVISION OF OTHER NORMAL PREGNANCY, SECOND TRIMESTER: Primary | ICD-10-CM

## 2024-03-19 LAB
SL AMB  POCT GLUCOSE, UA: NEGATIVE
SL AMB POCT URINE PROTEIN: NEGATIVE

## 2024-03-19 PROCEDURE — 81002 URINALYSIS NONAUTO W/O SCOPE: CPT | Performed by: STUDENT IN AN ORGANIZED HEALTH CARE EDUCATION/TRAINING PROGRAM

## 2024-03-19 PROCEDURE — PNV: Performed by: STUDENT IN AN ORGANIZED HEALTH CARE EDUCATION/TRAINING PROGRAM

## 2024-03-19 NOTE — PROGRESS NOTES
34 y.o.  at 22w3d, here for routine OB visit. Feeling well overall and without concerns. Good FM. Denies LOF, VB, contractions. No questions/concerns.     Problem List Items Addressed This Visit          Endocrine    Hypothyroidism during pregnancy in second trimester       Hematopoietic and Hemostatic    APL (antiphospholipid syndrome) (HCC)     Lupus anticoagulant negative. Unlikely APL            Obstetrics/Gynecology    22 weeks gestation of pregnancy     -precautions reviewed  -prepregnancy BMI 27 with goal weight gain 15-25#: TWG = 13#          Other Visit Diagnoses       Encounter for supervision of other normal pregnancy, second trimester    -  Primary

## 2024-03-29 DIAGNOSIS — E03.8 SUBCLINICAL HYPOTHYROIDISM: ICD-10-CM

## 2024-03-29 RX ORDER — LEVOTHYROXINE SODIUM 0.05 MG/1
TABLET ORAL
Qty: 30 TABLET | Refills: 5 | Status: SHIPPED | OUTPATIENT
Start: 2024-03-29

## 2024-04-17 ENCOUNTER — ROUTINE PRENATAL (OUTPATIENT)
Age: 34
End: 2024-04-17
Payer: COMMERCIAL

## 2024-04-17 VITALS
BODY MASS INDEX: 42.46 KG/M2 | HEIGHT: 66 IN | WEIGHT: 264.2 LBS | DIASTOLIC BLOOD PRESSURE: 72 MMHG | SYSTOLIC BLOOD PRESSURE: 112 MMHG

## 2024-04-17 DIAGNOSIS — Z3A.26 26 WEEKS GESTATION OF PREGNANCY: ICD-10-CM

## 2024-04-17 DIAGNOSIS — Z34.93 PRENATAL CARE IN THIRD TRIMESTER: Primary | ICD-10-CM

## 2024-04-17 LAB
SL AMB  POCT GLUCOSE, UA: NORMAL
SL AMB POCT URINE PROTEIN: NORMAL

## 2024-04-17 PROCEDURE — PNV

## 2024-04-17 PROCEDURE — 81002 URINALYSIS NONAUTO W/O SCOPE: CPT

## 2024-04-17 NOTE — PROGRESS NOTES
Problem   26 Weeks Gestation of Pregnancy    Blood Type: A positive. Antibody negative  Pap 2021. Neg/neg   GC/CT -  neg  PN1 Labs- wnl    28 Week Labs-  COVID vaccine- x 2   Flu vaccine - completed  Blue folder- reviewed     Genetic screening- NIPT low  AFP- neg  Level 1- 24  Level 2- 3/12    Yellow folder-  TDAP -   Delivery consent-  Breast pump -   Pediatrician - has    Perineal massage -  GBS swab -   IOL -       26 weeks gestation of pregnancy  Rachel Doe is a 34 y.o.  26w4d here for routine prenatal care.   Prepregnancy BMI 39.89 with a goal weight gain 5 kg (11 lb)-9 kg (19 lb). TWG 7.802 kg (17 lb 3.2 oz)  Feels well. Denies LOF/CTX/VB. +FM. No concerns.     28 week labs ordered.   C/o LE swelling L>R. No pain or redness. Worse at the end of the night after she's been on her feet all day. Precautions advised. No HA, vision changes, or abdominal pain.    labor precautions reviewed.   Encouraged adequate hydration and nutrition  Pregnancy Essential guide and Baby and Me center web site recommended.

## 2024-04-17 NOTE — ASSESSMENT & PLAN NOTE
Rachel Doe is a 34 y.o.  26w4d here for routine prenatal care.   Prepregnancy BMI 39.89 with a goal weight gain 5 kg (11 lb)-9 kg (19 lb). TWG 7.802 kg (17 lb 3.2 oz)  Feels well. Denies LOF/CTX/VB. +FM. No concerns.     28 week labs ordered.   C/o LE swelling L>R. No pain or redness. Worse at the end of the night after she's been on her feet all day. Precautions advised. No HA, vision changes, or abdominal pain.    labor precautions reviewed.   Encouraged adequate hydration and nutrition  Pregnancy Essential guide and Baby and Me center web site recommended.

## 2024-04-17 NOTE — PATIENT INSTRUCTIONS
Pregnancy at 27 to 30 Weeks   AMBULATORY CARE:   What changes are happening to your body:  You may notice new symptoms such as shortness of breath, heartburn, or swelling of your ankles and feet. You may also have trouble sleeping or contractions.  Seek care immediately if:   You develop a severe headache that does not go away.    You have new or increased vision changes, such as blurred or spotted vision.    You have new or increased swelling in your face or hands.    You have vaginal spotting or bleeding.    Your water broke or you feel warm water gushing or trickling from your vagina.    Call your doctor or obstetrician if:   You have more than 5 contractions in 1 hour.    You notice any changes in your baby's movements.    You have abdominal cramps, pressure, or tightening.    You have a change in vaginal discharge.    You have chills or a fever.    You have vaginal itching, burning, or pain.    You have yellow, green, white, or foul-smelling vaginal discharge.    You have pain or burning when you urinate, less urine than usual, or pink or bloody urine.    You have questions or concerns about your condition or care.    How to care for yourself at this stage of your pregnancy:       Eat a variety of healthy foods.  Healthy foods include fruits, vegetables, whole-grain breads, low-fat dairy foods, beans, lean meats, and fish. Drink liquids as directed. Ask how much liquid to drink each day and which liquids are best for you. Limit caffeine to less than 200 milligrams each day. Limit your intake of fish to 2 servings each week. Choose fish low in mercury such as canned light tuna, shrimp, salmon, cod, or tilapia. Do not  eat fish high in mercury such as swordfish, tilefish, roland mackerel, and shark.         Manage heartburn  by eating 4 or 5 small meals each day instead of large meals. Avoid spicy food.         Manage swelling  by lying down and putting your feet up.         Take prenatal vitamins as directed.   Your need for certain vitamins and minerals, such as folic acid, increases during pregnancy. Prenatal vitamins provide some of the extra vitamins and minerals you need. Prenatal vitamins may also help to decrease the risk of certain birth defects.         Talk to your healthcare provider about exercise.  Moderate exercise can help you stay fit. Your healthcare provider will help you plan an exercise program that is safe for you during pregnancy.         Do not smoke.  Smoking increases your risk of a miscarriage and other health problems during your pregnancy. Smoking can cause your baby to be born too early or weigh less at birth. Ask your healthcare provider for information if you need help quitting.    Do not drink alcohol.  Alcohol passes from your body to your baby through the placenta. It can affect your baby's brain development and cause fetal alcohol syndrome (FAS). FAS is a group of conditions that causes mental, behavior, and growth problems.    Talk to your healthcare provider before you take any medicines.  Many medicines may harm your baby if you take them when you are pregnant. Do not take any medicines, vitamins, herbs, or supplements without first talking to your healthcare provider. Never use illegal or street drugs (such as marijuana or cocaine) while you are pregnant.  Safety tips during pregnancy:   Avoid hot tubs and saunas.  Do not use a hot tub or sauna while you are pregnant, especially during your first trimester. Hot tubs and saunas may raise your baby's temperature and increase the risk of birth defects.    Avoid toxoplasmosis.  This is an infection caused by eating raw meat or being around infected cat feces. It can cause birth defects, miscarriages, and other problems. Wash your hands after you touch raw meat. Make sure any meat is well-cooked before you eat it. Avoid raw eggs and unpasteurized milk. Use gloves or ask someone else to clean your cat's litter box while you are pregnant.        Changes that are happening with your baby:  By 30 weeks, your baby may weigh more than 3 pounds. Your baby may be about 11 inches long from the top of the head to the rump (baby's bottom). Your baby's eyes open and close now. Your baby's kicks and movements are more forceful at this time.  What you need to know about prenatal care:  Your healthcare provider will check your blood pressure and weight. You may also need the following:  Blood tests  may be done to check for anemia or blood type.    A urine test  may also be done to check for sugar and protein. These can be signs of gestational diabetes or infection. Protein in your urine may also be a sign of preeclampsia. Preeclampsia is a condition that can develop during week 20 or later of your pregnancy. It causes high blood pressure, and it can cause problems with your kidneys and other organs.    A Tdap vaccine and flu vaccine  may be recommended by your healthcare provider.    A gestational diabetes screen  may be done. Your healthcare provider may order either a 1-step or 2-step oral glucose tolerance test (OGTT).     1-step OGTT:  Your blood sugar level will be tested after you have not eaten for 8 hours (fasting). You will then be given a glucose drink. Your level will be tested again 1 hour and 2 hours after you finish the drink.    2-step OGTT:  You do not have to fast for the first part of the test. You will have the glucose drink at any time of day. Your blood sugar level will be checked 1 hour later. If your blood sugar is higher than a certain level, another test will be ordered. You will fast and your blood sugar level will be tested. You will have the glucose drink. Your blood will be tested again 1 hour, 2 hours, and 3 hours after you finish the glucose drink.    Fundal height  is a measurement of your uterus to check your baby's growth. This number is usually the same as the number of weeks that you have been pregnant. Your healthcare provider  may also check your baby's position.    Your baby's heart rate  will be checked.    Follow up with your doctor or obstetrician as directed:  Write down your questions so you remember to ask them during your visits.  © 2023 Information is for End User's use only and may not be sold, redistributed or otherwise used for commercial purposes.  The above information is an  only. It is not intended as medical advice for individual conditions or treatments. Talk to your doctor, nurse or pharmacist before following any medical regimen to see if it is safe and effective for you.   Labor   AMBULATORY CARE:    (premature) labor  occurs when the uterus contracts and your cervix opens earlier than normal. The cervix is the opening of your uterus.  labor happens after the 20th week of pregnancy but before the 37th week. You may have premature rupture of membranes (PROM). PROM means your water broke before labor began. An early labor could cause you to have your baby before he or she is ready to be born.       Common signs and symptoms include the following:  You may not know that you are having  labor. It is common to have  contractions (tightening and relaxing of the uterus) and not notice them. The following are signs and symptoms that suggest a  labor:  Contractions that get stronger and closer together    Changes in vaginal discharge, such as more discharge or discharge that is watery or bloody     Low back pain     Pressure in the lower abdomen     Vaginal spotting or bleeding    Call your local emergency number (911 in the [place]) if:   You see or feel like there is something in your vagina.      Call your doctor if:   You have bright red, painless vaginal bleeding.    Your symptoms do not get better or they get worse.    Your water broke or you feel warm water gushing or trickling from your vagina.    You have contractions that get stronger and closer  together for more than 1 hour.     You notice a decrease in your baby's movement.    You have abdominal cramps, pressure, or tightening.    You have a change in vaginal discharge.    You have a fever.     You have burning when you urinate or you are urinating less than is normal for you.    You have questions or concerns about your condition or care.    How  labor is diagnosed:  You may have one or more of the following tests to check for  labor:  A pelvic exam  is also called an internal or vaginal exam. During a pelvic exam, your healthcare provider will gently put a warmed speculum into your vagina. A speculum is a tool that opens your vagina. This lets your healthcare provider see if your cervix is opening.    A vaginal ultrasound  uses sound waves to show pictures of your cervix and your baby inside your uterus. During this test, a small tube is placed into your vagina. This test will help your healthcare provider see if your cervix is opening.    A fetal ultrasound  uses sound waves to show pictures of your baby inside your uterus. The movement, heart rate, and position of your baby can also be seen.         A fetal fibronectin test  checks for a protein called fetal fibronectin in the cervix or vagina. Normally, there is no protein in cervical and vaginal secretions until the 20th week of pregnancy up to the end of pregnancy.    Blood and urine tests  may be done to look for signs of infection.    Treatment for  labor  may delay delivery. You may need any of the following:  Bed rest  may be recommended. You may need to lie on your left side, which improves circulation to the uterus and baby. Your healthcare provider will tell you when it is okay to get out of bed.    Medicine  may be given to stop contractions if your baby is not ready to be born. You may also need certain medicines if your  labor cannot be stopped and your healthcare provider thinks you will have your baby early.  These medicines help your baby's lungs, brain, and digestive organs mature. They also help decrease your baby's risk of being born with cerebral palsy. If you have PROM, fluid from your vagina or rectum will be checked for a strep infection. You may be given antibiotics to prevent a strep infection during delivery. Antibiotics may also be used to prevent labor from starting. You may also need steroids to decrease the risk for complications due to  labor.    Self-care:   Rest  as much as possible. You may need to lie on your left side to improve circulation to the uterus and baby. You may be able to prevent  labor by resting and reducing your physical activity.    Ask your healthcare provider about activities that are safe for you to do.  Your healthcare provider or obstetrician may recommend that you avoid sexual intercourse. Ask your healthcare provider if exercise is safe.     Drink liquids as directed.  Ask how much liquid to drink each day and which liquids are best for you.     Do not smoke.  Your baby may not grow well and he or she may weigh less at birth if you smoke during pregnancy. Smoking also increases the risk that your baby will be born too early. Nicotine and other chemicals in cigarettes and cigars can cause lung damage. Ask your healthcare provider for information if you currently smoke and need help to quit. E-cigarettes or smokeless tobacco still contain nicotine. Talk to your healthcare provider before you use these products.     Do not drink alcohol.  Alcohol may harm your unborn baby and cause  labor.     Maintain a healthy weight.  A healthy weight may prevent  labor. Ask your healthcare provider how much weight you should gain during your pregnancy.    Follow up with your doctor as directed:  Write down your questions so you remember to ask them during your visits.  © Copyright Mer2023 Information is for End User's use only and may not be sold, redistributed  or otherwise used for commercial purposes.  The above information is an  only. It is not intended as medical advice for individual conditions or treatments. Talk to your doctor, nurse or pharmacist before following any medical regimen to see if it is safe and effective for you.

## 2024-04-25 PROBLEM — D68.61 APL (ANTIPHOSPHOLIPID SYNDROME) (HCC): Status: RESOLVED | Noted: 2024-02-20 | Resolved: 2024-04-25

## 2024-04-25 PROBLEM — O99.283 HYPOTHYROIDISM DURING PREGNANCY IN THIRD TRIMESTER: Status: ACTIVE | Noted: 2024-01-20

## 2024-04-25 PROBLEM — O99.213 OBESITY AFFECTING PREGNANCY IN THIRD TRIMESTER: Status: ACTIVE | Noted: 2024-01-20

## 2024-04-25 PROBLEM — Z3A.28 28 WEEKS GESTATION OF PREGNANCY: Status: ACTIVE | Noted: 2024-01-22

## 2024-04-25 PROBLEM — O09.523 MULTIGRAVIDA OF ADVANCED MATERNAL AGE IN THIRD TRIMESTER: Status: ACTIVE | Noted: 2024-01-20

## 2024-04-25 NOTE — PATIENT INSTRUCTIONS
Pregnancy at 27 to 30 Weeks   AMBULATORY CARE:   What changes are happening to your body:  You may notice new symptoms such as shortness of breath, heartburn, or swelling of your ankles and feet. You may also have trouble sleeping or contractions.  Seek care immediately if:   You develop a severe headache that does not go away.    You have new or increased vision changes, such as blurred or spotted vision.    You have new or increased swelling in your face or hands.    You have vaginal spotting or bleeding.    Your water broke or you feel warm water gushing or trickling from your vagina.    Call your doctor or obstetrician if:   You have more than 5 contractions in 1 hour.    You notice any changes in your baby's movements.    You have abdominal cramps, pressure, or tightening.    You have a change in vaginal discharge.    You have chills or a fever.    You have vaginal itching, burning, or pain.    You have yellow, green, white, or foul-smelling vaginal discharge.    You have pain or burning when you urinate, less urine than usual, or pink or bloody urine.    You have questions or concerns about your condition or care.    How to care for yourself at this stage of your pregnancy:       Eat a variety of healthy foods.  Healthy foods include fruits, vegetables, whole-grain breads, low-fat dairy foods, beans, lean meats, and fish. Drink liquids as directed. Ask how much liquid to drink each day and which liquids are best for you. Limit caffeine to less than 200 milligrams each day. Limit your intake of fish to 2 servings each week. Choose fish low in mercury such as canned light tuna, shrimp, salmon, cod, or tilapia. Do not  eat fish high in mercury such as swordfish, tilefish, roland mackerel, and shark.         Manage heartburn  by eating 4 or 5 small meals each day instead of large meals. Avoid spicy food.         Manage swelling  by lying down and putting your feet up.         Take prenatal vitamins as directed.   Your need for certain vitamins and minerals, such as folic acid, increases during pregnancy. Prenatal vitamins provide some of the extra vitamins and minerals you need. Prenatal vitamins may also help to decrease the risk of certain birth defects.         Talk to your healthcare provider about exercise.  Moderate exercise can help you stay fit. Your healthcare provider will help you plan an exercise program that is safe for you during pregnancy.         Do not smoke.  Smoking increases your risk of a miscarriage and other health problems during your pregnancy. Smoking can cause your baby to be born too early or weigh less at birth. Ask your healthcare provider for information if you need help quitting.    Do not drink alcohol.  Alcohol passes from your body to your baby through the placenta. It can affect your baby's brain development and cause fetal alcohol syndrome (FAS). FAS is a group of conditions that causes mental, behavior, and growth problems.    Talk to your healthcare provider before you take any medicines.  Many medicines may harm your baby if you take them when you are pregnant. Do not take any medicines, vitamins, herbs, or supplements without first talking to your healthcare provider. Never use illegal or street drugs (such as marijuana or cocaine) while you are pregnant.  Safety tips during pregnancy:   Avoid hot tubs and saunas.  Do not use a hot tub or sauna while you are pregnant, especially during your first trimester. Hot tubs and saunas may raise your baby's temperature and increase the risk of birth defects.    Avoid toxoplasmosis.  This is an infection caused by eating raw meat or being around infected cat feces. It can cause birth defects, miscarriages, and other problems. Wash your hands after you touch raw meat. Make sure any meat is well-cooked before you eat it. Avoid raw eggs and unpasteurized milk. Use gloves or ask someone else to clean your cat's litter box while you are pregnant.        Changes that are happening with your baby:  By 30 weeks, your baby may weigh more than 3 pounds. Your baby may be about 11 inches long from the top of the head to the rump (baby's bottom). Your baby's eyes open and close now. Your baby's kicks and movements are more forceful at this time.  What you need to know about prenatal care:  Your healthcare provider will check your blood pressure and weight. You may also need the following:  Blood tests  may be done to check for anemia or blood type.    A urine test  may also be done to check for sugar and protein. These can be signs of gestational diabetes or infection. Protein in your urine may also be a sign of preeclampsia. Preeclampsia is a condition that can develop during week 20 or later of your pregnancy. It causes high blood pressure, and it can cause problems with your kidneys and other organs.    A Tdap vaccine and flu vaccine  may be recommended by your healthcare provider.    A gestational diabetes screen  may be done. Your healthcare provider may order either a 1-step or 2-step oral glucose tolerance test (OGTT).     1-step OGTT:  Your blood sugar level will be tested after you have not eaten for 8 hours (fasting). You will then be given a glucose drink. Your level will be tested again 1 hour and 2 hours after you finish the drink.    2-step OGTT:  You do not have to fast for the first part of the test. You will have the glucose drink at any time of day. Your blood sugar level will be checked 1 hour later. If your blood sugar is higher than a certain level, another test will be ordered. You will fast and your blood sugar level will be tested. You will have the glucose drink. Your blood will be tested again 1 hour, 2 hours, and 3 hours after you finish the glucose drink.    Fundal height  is a measurement of your uterus to check your baby's growth. This number is usually the same as the number of weeks that you have been pregnant. Your healthcare provider  may also check your baby's position.    Your baby's heart rate  will be checked.    Follow up with your doctor or obstetrician as directed:  Write down your questions so you remember to ask them during your visits.  © Copyright [employer] 2023 Information is for End User's use only and may not be sold, redistributed or otherwise used for commercial purposes.  The above information is an  only. It is not intended as medical advice for individual conditions or treatments. Talk to your doctor, nurse or pharmacist before following any medical regimen to see if it is safe and effective for you.

## 2024-04-30 ENCOUNTER — ROUTINE PRENATAL (OUTPATIENT)
Age: 34
End: 2024-04-30
Payer: COMMERCIAL

## 2024-04-30 VITALS
BODY MASS INDEX: 43.09 KG/M2 | SYSTOLIC BLOOD PRESSURE: 120 MMHG | WEIGHT: 267 LBS | HEART RATE: 107 BPM | DIASTOLIC BLOOD PRESSURE: 82 MMHG

## 2024-04-30 DIAGNOSIS — E03.9 HYPOTHYROIDISM DURING PREGNANCY, THIRD TRIMESTER: ICD-10-CM

## 2024-04-30 DIAGNOSIS — Z34.93 PRENATAL CARE IN THIRD TRIMESTER: Primary | ICD-10-CM

## 2024-04-30 DIAGNOSIS — Z87.59 HISTORY OF GESTATIONAL HYPERTENSION: ICD-10-CM

## 2024-04-30 DIAGNOSIS — O09.523 MULTIGRAVIDA OF ADVANCED MATERNAL AGE IN THIRD TRIMESTER: ICD-10-CM

## 2024-04-30 DIAGNOSIS — E03.9 HYPOTHYROIDISM DURING PREGNANCY IN THIRD TRIMESTER: ICD-10-CM

## 2024-04-30 DIAGNOSIS — O99.213 OBESITY AFFECTING PREGNANCY IN THIRD TRIMESTER, UNSPECIFIED OBESITY TYPE: ICD-10-CM

## 2024-04-30 DIAGNOSIS — O99.283 HYPOTHYROIDISM DURING PREGNANCY IN THIRD TRIMESTER: ICD-10-CM

## 2024-04-30 DIAGNOSIS — Z23 NEED FOR TDAP VACCINATION: ICD-10-CM

## 2024-04-30 DIAGNOSIS — O35.EXX0 FETAL RENAL ANOMALY, SINGLE GESTATION: ICD-10-CM

## 2024-04-30 DIAGNOSIS — Z3A.28 28 WEEKS GESTATION OF PREGNANCY: ICD-10-CM

## 2024-04-30 DIAGNOSIS — O99.283 HYPOTHYROIDISM DURING PREGNANCY, THIRD TRIMESTER: ICD-10-CM

## 2024-04-30 LAB
SL AMB  POCT GLUCOSE, UA: NORMAL
SL AMB POCT URINE PROTEIN: NORMAL

## 2024-04-30 PROCEDURE — PNV: Performed by: NURSE PRACTITIONER

## 2024-04-30 PROCEDURE — 81002 URINALYSIS NONAUTO W/O SCOPE: CPT | Performed by: NURSE PRACTITIONER

## 2024-04-30 PROCEDURE — 90715 TDAP VACCINE 7 YRS/> IM: CPT | Performed by: NURSE PRACTITIONER

## 2024-04-30 PROCEDURE — 90471 IMMUNIZATION ADMIN: CPT | Performed by: NURSE PRACTITIONER

## 2024-04-30 NOTE — ASSESSMENT & PLAN NOTE
She feels well. She denies LOF/CTX/VB. She did not have any concerns today. We discussed fetal kick counting. We discussed her weight gain thus far. She will work on mindful eating, staying active and light exercise. Overview charting updated today.

## 2024-04-30 NOTE — PROGRESS NOTES
Problem   28 Weeks Gestation of Pregnancy    Blood Type: A positive. Antibody negative  Pap 07/26/2021. Neg/neg   GC/CT -  neg  PN1 Labs- wnl    28 Week Labs- ordered last visit, TSH added, encourage to do today  COVID vaccine- x 2   Flu vaccine - completed  Blue folder- reviewed   TWG 9.072 kg (20 lb)    Genetic screening- NIPT low  AFP- neg  Level 1- 1/19/24  Level 2- 3/12  FG on 5/7    Yellow folder- reviewed  TDAP - 4/30/24  Delivery consent- signed  Breast pump - ordered  Pediatrician - has    Perineal massage -  GBS swab -   IOL -       28 weeks gestation of pregnancy  She feels well. She denies LOF/CTX/VB. She did not have any concerns today. We discussed fetal kick counting. We discussed her weight gain thus far. She will work on mindful eating, staying active and light exercise. Overview charting updated today.

## 2024-04-30 NOTE — ADDENDUM NOTE

## 2024-05-01 LAB
DME PARACHUTE DELIVERY DATE REQUESTED: NORMAL
DME PARACHUTE ITEM DESCRIPTION: NORMAL
DME PARACHUTE ORDER STATUS: NORMAL
DME PARACHUTE SUPPLIER NAME: NORMAL
DME PARACHUTE SUPPLIER PHONE: NORMAL

## 2024-05-04 ENCOUNTER — APPOINTMENT (OUTPATIENT)
Dept: LAB | Facility: HOSPITAL | Age: 34
End: 2024-05-04
Payer: COMMERCIAL

## 2024-05-04 DIAGNOSIS — E03.9 HYPOTHYROIDISM DURING PREGNANCY, THIRD TRIMESTER: ICD-10-CM

## 2024-05-04 DIAGNOSIS — O99.283 HYPOTHYROIDISM DURING PREGNANCY, THIRD TRIMESTER: ICD-10-CM

## 2024-05-04 DIAGNOSIS — Z34.93 PRENATAL CARE IN THIRD TRIMESTER: ICD-10-CM

## 2024-05-04 LAB
BASOPHILS # BLD AUTO: 0.02 THOUSANDS/ÂΜL (ref 0–0.1)
BASOPHILS NFR BLD AUTO: 0 % (ref 0–1)
EOSINOPHIL # BLD AUTO: 0.22 THOUSAND/ÂΜL (ref 0–0.61)
EOSINOPHIL NFR BLD AUTO: 3 % (ref 0–6)
ERYTHROCYTE [DISTWIDTH] IN BLOOD BY AUTOMATED COUNT: 13.7 % (ref 11.6–15.1)
GLUCOSE 1H P 50 G GLC PO SERPL-MCNC: 112 MG/DL (ref 70–134)
HCT VFR BLD AUTO: 34.3 % (ref 34.8–46.1)
HGB BLD-MCNC: 11.1 G/DL (ref 11.5–15.4)
IMM GRANULOCYTES # BLD AUTO: 0.08 THOUSAND/UL (ref 0–0.2)
IMM GRANULOCYTES NFR BLD AUTO: 1 % (ref 0–2)
LYMPHOCYTES # BLD AUTO: 1.01 THOUSANDS/ÂΜL (ref 0.6–4.47)
LYMPHOCYTES NFR BLD AUTO: 13 % (ref 14–44)
MCH RBC QN AUTO: 29.5 PG (ref 26.8–34.3)
MCHC RBC AUTO-ENTMCNC: 32.4 G/DL (ref 31.4–37.4)
MCV RBC AUTO: 91 FL (ref 82–98)
MONOCYTES # BLD AUTO: 0.32 THOUSAND/ÂΜL (ref 0.17–1.22)
MONOCYTES NFR BLD AUTO: 4 % (ref 4–12)
NEUTROPHILS # BLD AUTO: 5.91 THOUSANDS/ÂΜL (ref 1.85–7.62)
NEUTS SEG NFR BLD AUTO: 79 % (ref 43–75)
NRBC BLD AUTO-RTO: 0 /100 WBCS
PLATELET # BLD AUTO: 215 THOUSANDS/UL (ref 149–390)
PMV BLD AUTO: 10.2 FL (ref 8.9–12.7)
RBC # BLD AUTO: 3.76 MILLION/UL (ref 3.81–5.12)
TSH SERPL DL<=0.05 MIU/L-ACNC: 0.96 UIU/ML (ref 0.45–4.5)
WBC # BLD AUTO: 7.56 THOUSAND/UL (ref 4.31–10.16)

## 2024-05-04 PROCEDURE — 86780 TREPONEMA PALLIDUM: CPT

## 2024-05-04 PROCEDURE — 85025 COMPLETE CBC W/AUTO DIFF WBC: CPT

## 2024-05-04 PROCEDURE — 84443 ASSAY THYROID STIM HORMONE: CPT

## 2024-05-04 PROCEDURE — 36415 COLL VENOUS BLD VENIPUNCTURE: CPT

## 2024-05-04 PROCEDURE — 82950 GLUCOSE TEST: CPT

## 2024-05-05 LAB — TREPONEMA PALLIDUM IGG+IGM AB [PRESENCE] IN SERUM OR PLASMA BY IMMUNOASSAY: NORMAL

## 2024-05-07 ENCOUNTER — ULTRASOUND (OUTPATIENT)
Dept: PERINATAL CARE | Facility: OTHER | Age: 34
End: 2024-05-07
Payer: COMMERCIAL

## 2024-05-07 VITALS
DIASTOLIC BLOOD PRESSURE: 68 MMHG | HEIGHT: 66 IN | HEART RATE: 98 BPM | BODY MASS INDEX: 42.91 KG/M2 | WEIGHT: 267 LBS | SYSTOLIC BLOOD PRESSURE: 112 MMHG

## 2024-05-07 DIAGNOSIS — E66.01 SEVERE OBESITY DUE TO EXCESS CALORIES AFFECTING PREGNANCY IN THIRD TRIMESTER (HCC): ICD-10-CM

## 2024-05-07 DIAGNOSIS — O99.213 SEVERE OBESITY DUE TO EXCESS CALORIES AFFECTING PREGNANCY IN THIRD TRIMESTER (HCC): ICD-10-CM

## 2024-05-07 DIAGNOSIS — Z3A.29 29 WEEKS GESTATION OF PREGNANCY: ICD-10-CM

## 2024-05-07 DIAGNOSIS — O09.523 MULTIGRAVIDA OF ADVANCED MATERNAL AGE IN THIRD TRIMESTER: ICD-10-CM

## 2024-05-07 DIAGNOSIS — O35.EXX0 FETAL RENAL ANOMALY, SINGLE GESTATION: Primary | ICD-10-CM

## 2024-05-07 PROCEDURE — 99213 OFFICE O/P EST LOW 20 MIN: CPT | Performed by: OBSTETRICS & GYNECOLOGY

## 2024-05-07 PROCEDURE — 76816 OB US FOLLOW-UP PER FETUS: CPT | Performed by: OBSTETRICS & GYNECOLOGY

## 2024-05-07 NOTE — PROGRESS NOTES
The patient was seen today for an ultrasound.  Please see ultrasound report (located under Ob Procedures) for additional details.   Thank you very much for allowing us to participate in the care of this very nice patient.  Should you have any questions, please do not hesitate to contact me.     Juan Carlos Martino MD FACOG  Attending Physician, Maternal-Fetal Medicine  [facility]

## 2024-05-14 ENCOUNTER — ROUTINE PRENATAL (OUTPATIENT)
Dept: OBGYN CLINIC | Facility: CLINIC | Age: 34
End: 2024-05-14
Payer: COMMERCIAL

## 2024-05-14 VITALS — BODY MASS INDEX: 43.42 KG/M2 | SYSTOLIC BLOOD PRESSURE: 112 MMHG | DIASTOLIC BLOOD PRESSURE: 76 MMHG | WEIGHT: 269 LBS

## 2024-05-14 DIAGNOSIS — O09.523 MULTIGRAVIDA OF ADVANCED MATERNAL AGE IN THIRD TRIMESTER: ICD-10-CM

## 2024-05-14 DIAGNOSIS — O99.213 SEVERE OBESITY DUE TO EXCESS CALORIES AFFECTING PREGNANCY IN THIRD TRIMESTER (HCC): ICD-10-CM

## 2024-05-14 DIAGNOSIS — E66.01 SEVERE OBESITY DUE TO EXCESS CALORIES AFFECTING PREGNANCY IN THIRD TRIMESTER (HCC): ICD-10-CM

## 2024-05-14 DIAGNOSIS — Z34.93 PRENATAL CARE IN THIRD TRIMESTER: Primary | ICD-10-CM

## 2024-05-14 DIAGNOSIS — Z3A.30 30 WEEKS GESTATION OF PREGNANCY: ICD-10-CM

## 2024-05-14 DIAGNOSIS — O35.EXX0 FETAL RENAL ANOMALY, SINGLE GESTATION: ICD-10-CM

## 2024-05-14 LAB
SL AMB  POCT GLUCOSE, UA: NORMAL
SL AMB POCT URINE PROTEIN: NORMAL

## 2024-05-14 PROCEDURE — 81002 URINALYSIS NONAUTO W/O SCOPE: CPT | Performed by: OBSTETRICS & GYNECOLOGY

## 2024-05-14 PROCEDURE — PNV: Performed by: OBSTETRICS & GYNECOLOGY

## 2024-05-14 NOTE — ASSESSMENT & PLAN NOTE
Signs of  labor and fetal kick counts discussed.   PNC follow up scheduled  28 week labs reviewed wnl  Encourage healthy diet, exercise, PNV    TWG 22lbs

## 2024-05-14 NOTE — PROGRESS NOTES
30 weeks gestation of pregnancy  Signs of  labor and fetal kick counts discussed.   PNC follow up scheduled  28 week labs reviewed wnl  Encourage healthy diet, exercise, PNV    TWG 22lbs    Problem   Fetal Renal Anomaly, Single Gestation    Left UTDA1 4.5mm  Follow up imaging scheduled            Obesity Affecting Pregnancy in Third Trimester    Serial growth  LD ASA until 36 weeks           Patient is here for prenatal ob visit today.    GA: 30w3d  MARIANN: 24    Urine: Protein neg / Glucose neg  Denies loss of fluid, vaginal bleeding or contractions.  Good fetal movement.   Patient is having a girl  Labs utd   Blue folder given  Yellow folder given   Delivery consent signed  Birth plan not yet returned  Breast pump ordered  Peds referral not needed  Tdap given  Rhogam not needed. A positive.     No question's or concerns at this time.

## 2024-05-28 PROBLEM — Z3A.32 32 WEEKS GESTATION OF PREGNANCY: Status: ACTIVE | Noted: 2024-01-22

## 2024-05-29 ENCOUNTER — ROUTINE PRENATAL (OUTPATIENT)
Dept: OBGYN CLINIC | Facility: MEDICAL CENTER | Age: 34
End: 2024-05-29
Payer: COMMERCIAL

## 2024-05-29 VITALS
HEART RATE: 110 BPM | DIASTOLIC BLOOD PRESSURE: 72 MMHG | BODY MASS INDEX: 43.71 KG/M2 | SYSTOLIC BLOOD PRESSURE: 122 MMHG | HEIGHT: 66 IN | WEIGHT: 272 LBS

## 2024-05-29 DIAGNOSIS — O99.283 HYPOTHYROIDISM DURING PREGNANCY IN THIRD TRIMESTER: ICD-10-CM

## 2024-05-29 DIAGNOSIS — O09.523 MULTIGRAVIDA OF ADVANCED MATERNAL AGE IN THIRD TRIMESTER: ICD-10-CM

## 2024-05-29 DIAGNOSIS — O35.EXX0 FETAL RENAL ANOMALY, SINGLE GESTATION: ICD-10-CM

## 2024-05-29 DIAGNOSIS — Z34.93 PRENATAL CARE IN THIRD TRIMESTER: ICD-10-CM

## 2024-05-29 DIAGNOSIS — O99.213 SEVERE OBESITY DUE TO EXCESS CALORIES AFFECTING PREGNANCY IN THIRD TRIMESTER (HCC): ICD-10-CM

## 2024-05-29 DIAGNOSIS — Z3A.32 32 WEEKS GESTATION OF PREGNANCY: ICD-10-CM

## 2024-05-29 DIAGNOSIS — Z87.59 HISTORY OF GESTATIONAL HYPERTENSION: Primary | ICD-10-CM

## 2024-05-29 DIAGNOSIS — R03.0 ELEVATED BLOOD PRESSURE READING IN OFFICE WITHOUT DIAGNOSIS OF HYPERTENSION: ICD-10-CM

## 2024-05-29 DIAGNOSIS — E66.01 SEVERE OBESITY DUE TO EXCESS CALORIES AFFECTING PREGNANCY IN THIRD TRIMESTER (HCC): ICD-10-CM

## 2024-05-29 DIAGNOSIS — R00.2 INTERMITTENT PALPITATIONS: ICD-10-CM

## 2024-05-29 DIAGNOSIS — E03.9 HYPOTHYROIDISM DURING PREGNANCY IN THIRD TRIMESTER: ICD-10-CM

## 2024-05-29 LAB
SL AMB  POCT GLUCOSE, UA: NORMAL
SL AMB POCT URINE PROTEIN: NORMAL

## 2024-05-29 PROCEDURE — 81002 URINALYSIS NONAUTO W/O SCOPE: CPT | Performed by: CLINICAL NURSE SPECIALIST

## 2024-05-29 PROCEDURE — PNV: Performed by: CLINICAL NURSE SPECIALIST

## 2024-05-29 NOTE — ASSESSMENT & PLAN NOTE
Noting palpitations/tachycardia moreso at night  Almost daily  Denies SOB  HR today 100-110.  Nml BP  Hx of hypothyroid and on replacement. Last check 5/4 and nml. Will recheck again, along with CBC, EKG, and holter monitor.  Call if HR sustained > 120 or symptoms more bothersome

## 2024-05-29 NOTE — ASSESSMENT & PLAN NOTE
Taking Low Dose Aspirin.  Exceeded rec. Wt gain. TWG 25 lbs at this point.  Encouraged healthy choices

## 2024-05-29 NOTE — PROGRESS NOTES
Rachel is a 34 y.o.  32w4d here for Routine Prenatal Visit (MARIANN /Blood type A+/Labs UTD/Del consent signed/Breast pump ordered/Peds not needed/Tdap 24/Rhogam not needed /Flu /RSV/Urine /GBS //Denies LOF, VB, or CTX./Pt has +FM/Questions or conerns- /)    Prenatal Visit  Subjective:   Denies unusual vaginal discharge, LOF, VB, or ctx. Reports Fetus is active.   Noting palpitations/tachycardia moreso at night  Almost daily  Denies SOB      Objective:  Vitals:    24 1541   BP: 122/72   Pulse: (!) 110     Pregravid Weight/BMI: 112 kg (247 lb) (BMI 39.89)  Current Weight: 123 kg (272 lb)   Total Weight Gain: 11.3 kg (25 lb)     OBGyn Exam  Physical Exam  Fetal Heart Rate: 145 ,      General: Not in acute distress and appearing well nourished and well groomed  Genitourinary:          Pelvic exam exam deferred   Cardiovascular:   Rate and Rhythm: Normal rate.   Pulmonary:    Effort: normal, not labored  Abdominal:  Abdomen is soft and gravid    Musculoskeletal: Active movement of all extremities, no gross limitations of ROM     Edema: +1  Neurological:   Mental Status: She is alert and oriented to person, place, and time.   Skin: General: Skin is warm and dry.   Psychiatric:    Mood and Affect: Mood normal.      Behavior: Behavior normal  Assessment & Plan:  1. History of gestational hypertension  2. Hypothyroidism during pregnancy in third trimester  Assessment & Plan:  TSH 5/4 nml at 0.956  No change in dosing. Rpt in 6 wks  Orders:  -     TSH, 3rd generation with Free T4 reflex; Future  3. Severe obesity due to excess calories affecting pregnancy in third trimester ([facility])  Assessment & Plan:  Taking Low Dose Aspirin.  Exceeded rec. Wt gain. TWG 25 lbs at this point.  Encouraged healthy choices   4. Multigravida of advanced maternal age in third trimester  5. 32 weeks gestation of pregnancy  Assessment & Plan:  , 32w4d  Noting palpitations- see other A&P  Reports good FM    Reminded pt to  choose a pediatrician - has- At [facility]- reviewed process during stay at [facility]    We again reviewed the S/S PTL and importance of consistent/regular FM. Reviewed process for [facility] and encouraged pt to call with any decreases in fetal movement  6. Prenatal care in third trimester  -     POCT urine dip  7. Elevated blood pressure reading in office without diagnosis of hypertension  Assessment & Plan:  BP normotensive today  NO c/o HA, vision changes or RUQ pain  Taking Low Dose Aspirin as recommended.  8. Fetal renal anomaly, single gestation  Assessment & Plan:  F/U imaging completed and is mild.  Overall growth WNL - with EFW 71%  9. Intermittent palpitations  Assessment & Plan:  Noting palpitations/tachycardia moreso at night  Almost daily  Denies SOB  HR today 100-110.  Nml BP  Hx of hypothyroid and on replacement. Last check 5/4 and nml. Will recheck again, along with CBC, EKG, and holter monitor.  Call if HR sustained > 120 or symptoms more bothersome   Orders:  -     TSH, 3rd generation with Free T4 reflex; Future  -     CBC and differential; Future  -     ECG with rhythm strip; Future  -     Holter monitor; Future; Expected date: 05/29/2024         ERIC Garcia  5/29/2024

## 2024-05-29 NOTE — ASSESSMENT & PLAN NOTE
BP normotensive today  NO c/o HA, vision changes or RUQ pain  Taking Low Dose Aspirin as recommended.

## 2024-05-29 NOTE — ASSESSMENT & PLAN NOTE
, 32w4d  Noting palpitations- see other A&P  Reports good FM    Remind[facility] pt to [facility] a [facility]- [facility] during stay at [facility]    We again reviewed the S/S PTL and importance of consistent/regular FM. Reviewed process for [facility] and encouraged pt to call with any decreases in fetal movement

## 2024-05-31 ENCOUNTER — LAB (OUTPATIENT)
Dept: LAB | Facility: HOSPITAL | Age: 34
End: 2024-05-31
Payer: COMMERCIAL

## 2024-05-31 ENCOUNTER — TELEPHONE (OUTPATIENT)
Age: 34
End: 2024-05-31

## 2024-05-31 ENCOUNTER — OFFICE VISIT (OUTPATIENT)
Dept: LAB | Facility: HOSPITAL | Age: 34
End: 2024-05-31
Payer: COMMERCIAL

## 2024-05-31 ENCOUNTER — HOSPITAL ENCOUNTER (OUTPATIENT)
Dept: NON INVASIVE DIAGNOSTICS | Facility: HOSPITAL | Age: 34
Discharge: HOME | End: 2024-05-31
Payer: COMMERCIAL

## 2024-05-31 DIAGNOSIS — R00.2 INTERMITTENT PALPITATIONS: ICD-10-CM

## 2024-05-31 DIAGNOSIS — O99.283 HYPOTHYROIDISM DURING PREGNANCY IN THIRD TRIMESTER: ICD-10-CM

## 2024-05-31 DIAGNOSIS — E03.9 HYPOTHYROIDISM DURING PREGNANCY IN THIRD TRIMESTER: ICD-10-CM

## 2024-05-31 LAB
ATRIAL RATE: 108 BPM
BASOPHILS # BLD AUTO: 0.03 THOUSANDS/ÂΜL (ref 0–0.1)
BASOPHILS NFR BLD AUTO: 0 % (ref 0–1)
EOSINOPHIL # BLD AUTO: 0.23 THOUSAND/ÂΜL (ref 0–0.61)
EOSINOPHIL NFR BLD AUTO: 2 % (ref 0–6)
ERYTHROCYTE [DISTWIDTH] IN BLOOD BY AUTOMATED COUNT: 14.3 % (ref 11.6–15.1)
HCT VFR BLD AUTO: 35.4 % (ref 34.8–46.1)
HGB BLD-MCNC: 11.7 G/DL (ref 11.5–15.4)
IMM GRANULOCYTES # BLD AUTO: 0.13 THOUSAND/UL (ref 0–0.2)
IMM GRANULOCYTES NFR BLD AUTO: 1 % (ref 0–2)
LYMPHOCYTES # BLD AUTO: 1.48 THOUSANDS/ÂΜL (ref 0.6–4.47)
LYMPHOCYTES NFR BLD AUTO: 16 % (ref 14–44)
MCH RBC QN AUTO: 29.3 PG (ref 26.8–34.3)
MCHC RBC AUTO-ENTMCNC: 33.1 G/DL (ref 31.4–37.4)
MCV RBC AUTO: 89 FL (ref 82–98)
MONOCYTES # BLD AUTO: 0.55 THOUSAND/ÂΜL (ref 0.17–1.22)
MONOCYTES NFR BLD AUTO: 6 % (ref 4–12)
NEUTROPHILS # BLD AUTO: 7.06 THOUSANDS/ÂΜL (ref 1.85–7.62)
NEUTS SEG NFR BLD AUTO: 75 % (ref 43–75)
NRBC BLD AUTO-RTO: 0 /100 WBCS
P AXIS: 25 DEGREES
PLATELET # BLD AUTO: 222 THOUSANDS/UL (ref 149–390)
PMV BLD AUTO: 10.5 FL (ref 8.9–12.7)
PR INTERVAL: 164 MS
QRS AXIS: 56 DEGREES
QRSD INTERVAL: 86 MS
QT INTERVAL: 338 MS
QTC INTERVAL: 452 MS
RBC # BLD AUTO: 3.99 MILLION/UL (ref 3.81–5.12)
T WAVE AXIS: 14 DEGREES
TSH SERPL DL<=0.05 MIU/L-ACNC: 1.14 UIU/ML (ref 0.45–4.5)
VENTRICULAR RATE: 108 BPM
WBC # BLD AUTO: 9.48 THOUSAND/UL (ref 4.31–10.16)

## 2024-05-31 PROCEDURE — 93225 XTRNL ECG REC<48 HRS REC: CPT

## 2024-05-31 PROCEDURE — 36415 COLL VENOUS BLD VENIPUNCTURE: CPT

## 2024-05-31 PROCEDURE — 84443 ASSAY THYROID STIM HORMONE: CPT

## 2024-05-31 PROCEDURE — 93010 ELECTROCARDIOGRAM REPORT: CPT | Performed by: INTERNAL MEDICINE

## 2024-05-31 PROCEDURE — 93226 XTRNL ECG REC<48 HR SCAN A/R: CPT

## 2024-05-31 PROCEDURE — 85025 COMPLETE CBC W/AUTO DIFF WBC: CPT

## 2024-05-31 PROCEDURE — 93005 ELECTROCARDIOGRAM TRACING: CPT

## 2024-06-04 PROCEDURE — 93227 XTRNL ECG REC<48 HR R&I: CPT | Performed by: INTERNAL MEDICINE

## 2024-06-05 ENCOUNTER — HOSPITAL ENCOUNTER (OUTPATIENT)
Facility: HOSPITAL | Age: 34
Discharge: HOME | End: 2024-06-05
Attending: OBSTETRICS & GYNECOLOGY | Admitting: OBSTETRICS & GYNECOLOGY
Payer: COMMERCIAL

## 2024-06-05 ENCOUNTER — NURSE TRIAGE (OUTPATIENT)
Age: 34
End: 2024-06-05

## 2024-06-05 VITALS
RESPIRATION RATE: 16 BRPM | TEMPERATURE: 98.3 F | SYSTOLIC BLOOD PRESSURE: 114 MMHG | HEART RATE: 113 BPM | DIASTOLIC BLOOD PRESSURE: 55 MMHG

## 2024-06-05 PROBLEM — O36.8190 DECREASED FETAL MOVEMENT: Status: ACTIVE | Noted: 2024-06-05

## 2024-06-05 PROBLEM — O36.8190 DECREASED FETAL MOVEMENT: Status: RESOLVED | Noted: 2024-06-05 | Resolved: 2024-06-05

## 2024-06-05 PROCEDURE — 76815 OB US LIMITED FETUS(S): CPT

## 2024-06-05 PROCEDURE — NC001 PR NO CHARGE: Performed by: OBSTETRICS & GYNECOLOGY

## 2024-06-05 PROCEDURE — 99213 OFFICE O/P EST LOW 20 MIN: CPT

## 2024-06-05 PROCEDURE — 59025 FETAL NON-STRESS TEST: CPT

## 2024-06-05 NOTE — DISCHARGE INSTR - AVS FIRST PAGE
Please call or return if you experience a gush of watery fluid, heavy vaginal bleeding, painful contractions, or baby not meeting kick counts.  Please follow-up with your routinely scheduled prenatal appointments.

## 2024-06-05 NOTE — PROCEDURES
Rachel Doe, a  at 33w4d with an MARIANN of 2024, by Ultrasound, was seen at [facility] for the following procedure(s): $Procedure Type: SONIA, NST]    Nonstress Test  Reason for NST: Decreased Fetal Movement  Variability: Moderate  Decelerations: None  Accelerations: Yes  Acoustic Stimulator: No  Baseline: 150 BPM  Uterine Irritability: No  Contractions: Not present    4 Quadrant SONIA  SONIA Q1 (cm): 3.3 cm  SONIA Q2 (cm): 1.6 cm  SONIA Q3 (cm): 2.2 cm  SONIA Q4 (cm): 4.6 cm  SONIA TOTAL (cm): 11.7 cm  LVP (cm): 4.6 cm              Interpretation  Nonstress Test Interpretation: Reactive  Overall Impression: Reassuring            Kaitlin Wilson MD  PGY-1 OB/GYN

## 2024-06-05 NOTE — TELEPHONE ENCOUNTER
"Patient  33w4d called noticing decreased FM over the past 3 days. States baby met kick counts yesterday, however attempted today and it was taking too long so stopped. Patient states there is a noticeable change in baby's movements over the past few days. Denies ctx, LOF, VB. Advised patient to proceed to L&D for evaluation.    EPIC chat sent to on call provider. DAIJA charge made aware by Dr Lee      Reason for Disposition   Pregnant 23 or more weeks and mother thinks baby is moving less today (e.g., even if kick count is normal or not performed)  (Exception: Mother was distracted by other activities.)    Answer Assessment - Initial Assessment Questions  1. FETAL MOVEMENT: \"Has the baby's movement decreased or changed significantly from normal?\" (e.g., yes, no; describe) \"When was the last time you felt the baby move?\" (e.g., minutes, hours)      Decreased over past 3 days  2. MARIANN: \"What date are you expecting to deliver?\"       24  3. PREGNANCY: \"How many weeks pregnant are you?\"       3344d  4. OTHER SYMPTOMS: \"Do you have any other symptoms?\" (e.g., abdominal pain, fever, leaking fluid from vagina, vaginal bleeding, widespread itching, etc.)      denies    Protocols used: Pregnancy - Decreased or Abnormal Fetal Movement-ADULT-[place]    "

## 2024-06-05 NOTE — PROGRESS NOTES
L&D Triage Note - OB/GYN  Rachel Doe 34 y.o. female MRN: [id number]  Unit/Bed#:  TRIAGE 2 Encounter: [id number]      ASSESSMENT:    Rachel Doe is a 34 y.o.  at 33w4d who presents for evaluation of decreased fetal movements. NST reactive, and SONIA was wnl. No concern for fetal distress,  labor or other obstetric emergencies. Patient stable for discharge with strict return precautions reviewed    PLAN:    1) Decreased fetal movements  - Discharge from OB triage with  labor precautions    - Reviewed rupture of membranes, false vs true labor, decreased fetal movement, and vaginal bleeding   - Pt to call provider with any concerns and follow up at her next scheduled prenatal appointment    - Continue routine prenatal care   - Case discussed with Dr. Knight     SUBJECTIVE:    Rachel Doe 34 y.o.  at 33w4d with an Estimated Date of Delivery: 24 presents for evaluation of decreased fetal movements. She reports intermittent decreased fetal movements since Saturday. She has had no contractions, no vaginal bleeding, leakage of fluids, nausea or vomiting. She states fetal movements improved significantly after a sugary drink on her way to the hospital.     Her current obstetrical history is significant for fetal renal anomaly, hypothyroidism,  hx of GHTN    Her past obstetrical history is significant for prior miscarriages.     Contractions: none  Leakage of fluid: none  Vaginal Bleeding: none  Fetal movement: present    OBJECTIVE:    Vitals:    24 1737   BP: 114/55   Pulse: (!) 113   Resp:    Temp:        ROS:  Constitutional: Negative for fever, chills  Respiratory: Negative for cough, SOB  Cardiovascular: Negative fr chest pain, palpitations    Gastrointestinal: Negative for nausea, vomiting     General Physical Exam:  General: in no apparent distress, well developed and well nourished, in no respiratory distress and acyanotic, and oriented times 3  Cardiovascular:  intact distals pulses   Lungs: non-labored breathing  Abdomen: abdomen is soft without significant tenderness, masses, organomegaly or guarding  Lower extremeties: nontender      Fetal monitoring:  FHT:  152 bpm/ Moderate 6 - 25 bpm / 15 x 15 accelerations present, 0 decelerations  [place]: contractions none           Abd. US   SONIA      - Q1 3.28 cm     - Q2 1.56 cm     - Q3 2.16 cm     - Q4 4.57 cm     - Total: 11.57 cm      Kayleigh Larson MD  6/5/2024 8:25 PM     Kaitlin Wilson MD  PGY-1 OB/GYN

## 2024-06-06 ENCOUNTER — TELEPHONE (OUTPATIENT)
Age: 34
End: 2024-06-06

## 2024-06-06 NOTE — TELEPHONE ENCOUNTER
Patient called back and I read off the holter montior results and gave her cardiology number is she would need it. Thank you

## 2024-06-07 PROBLEM — Z3A.34 34 WEEKS GESTATION OF PREGNANCY: Status: ACTIVE | Noted: 2024-01-22

## 2024-06-07 NOTE — ASSESSMENT & PLAN NOTE
Rachel Doe is a 34 y.o.   34w4d who presents for routine PNV.  Taking ASA, Levo, PNV daily , advised to stop ASA at 36 weeks   28 week labs reviewed: NML  TWG 12.7 kg (28 lb)   Denies OB complaints. Good fetal movement. Denies contractions, cramping, leakage of fluid or vaginal bleeding.   Tdap vaccine completed  Reviewed  labor precautions and FKCs.   Advised to start Perineal massage at 34-36 weeks   Pregnancy Essential guide and Baby and Me web site recommended

## 2024-06-07 NOTE — PATIENT INSTRUCTIONS
Pregnancy at 35 to 38 Weeks   AMBULATORY CARE:   Changes happening with your body:  You are considered full term at the beginning of 37 weeks. Your breathing may be easier if your baby has moved down into a head-down position. You may need to urinate more often because the baby may be pressing on your bladder. You may also feel more discomfort and get tired easily.   Seek care immediately if:   You develop a severe headache that does not go away.    You have new or increased vision changes, such as blurred or spotted vision.    You have new or increased swelling in your face or hands.    You have vaginal spotting or bleeding.    Your water broke or you feel warm water gushing or trickling from your vagina.    Call your obstetrician if:   You have more than 5 contractions in 1 hour.    You notice any changes in your baby's movements.    You have abdominal cramps, pressure, or tightening.    You have a change in vaginal discharge.    You have chills or a fever.    You have vaginal itching, burning, or pain.    You have yellow, green, white, or foul-smelling vaginal discharge.    You have pain or burning when you urinate, less urine than usual, or pink or bloody urine.    You have questions or concerns about your condition or care.    How to care for yourself at this stage of your pregnancy:       Eat a variety of healthy foods.  Healthy foods include fruits, vegetables, whole-grain breads, low-fat dairy foods, beans, lean meats, and fish. Drink liquids as directed. Ask how much liquid to drink each day and which liquids are best for you. Limit caffeine to less than 200 milligrams each day. Limit your intake of fish to 2 servings each week. Choose fish low in mercury such as canned light tuna, shrimp, salmon, cod, or tilapia. Do not  eat fish high in mercury such as swordfish, tilefish, roland mackerel, and shark.         Take prenatal vitamins as directed.  Your need for certain vitamins and minerals, such as folic  acid, increases during pregnancy. Prenatal vitamins provide some of the extra vitamins and minerals you need. Prenatal vitamins may also help to decrease the risk of certain birth defects.         Rest as needed.  Put your feet up if you have swelling in your ankles and feet.         Talk to your healthcare provider about exercise.  Moderate exercise can help you stay fit. Your healthcare provider will help you plan an exercise program that is safe for you during pregnancy.         Do not smoke.  Smoking increases your risk of a miscarriage and other health problems during your pregnancy. Smoking can cause your baby to be born early or weigh less at birth. Ask your healthcare provider for information if you need help quitting.    Do not drink alcohol.  Alcohol passes from your body to your baby through the placenta. It can affect your baby's brain development and cause fetal alcohol syndrome (FAS). FAS is a group of conditions that causes mental, behavior, and growth problems.    Talk to your healthcare provider before you take any medicines.  Many medicines may harm your baby if you take them when you are pregnant. Do not take any medicines, vitamins, herbs, or supplements without first talking to your healthcare provider. Never use illegal or street drugs (such as marijuana or cocaine) while you are pregnant.  Safety tips during pregnancy:   Avoid hot tubs and saunas.  Do not use a hot tub or sauna while you are pregnant, especially during your first trimester. Hot tubs and saunas may raise your baby's temperature and increase the risk of birth defects.    Avoid toxoplasmosis.  This is an infection caused by eating raw meat or being around infected cat feces. It can cause birth defects, miscarriages, and other problems. Wash your hands after you touch raw meat. Make sure any meat is well-cooked before you eat it. Avoid raw eggs and unpasteurized milk. Use gloves or ask someone else to clean your cat's litter box  while you are pregnant.         Ask your healthcare provider about travel.  The most comfortable time to travel is during the second trimester. Ask your provider if you can travel after 36 weeks. You may not be able to travel in an airplane after 36 weeks. He or she may also recommend you avoid long road trips.    Changes happening with your baby:  By 38 weeks, your baby may weigh between 6 and 9 pounds. Your baby may be about 14 inches long from the top of the head to the rump (baby's bottom). Your baby hears well enough to know your voice. As your baby gets larger, you may feel fewer kicks and more stretching and rolling. Your baby may move into a head-down position. Your baby will also rest lower in your abdomen.  What you need to know about prenatal care:  Your healthcare provider will check your blood pressure and weight. You may also need the following:  A urine test  may also be done to check for sugar and protein. These can be signs of gestational diabetes or infection. Protein in your urine may also be a sign of preeclampsia. Preeclampsia is a condition that can develop during week 20 or later of your pregnancy. It causes high blood pressure, and it can cause problems with your kidneys and other organs.    A gestational diabetes screen  may be done. Your healthcare provider may order either a 1-step or 2-step oral glucose tolerance test (OGTT).     1-step OGTT:  Your blood sugar level will be tested after you have not eaten for 8 hours (fasting). You will then be given a glucose drink. Your level will be tested again 1 hour and 2 hours after you finish the drink.    2-step OGTT:  You do not have to fast for the first part of the test. You will have the glucose drink at any time of day. Your blood sugar level will be checked 1 hour later. If your blood sugar is higher than a certain level, another test will be ordered. You will fast and your blood sugar level will be tested. You will have the glucose drink.  Your blood will be tested again 1 hour, 2 hours, and 3 hours after you finish the glucose drink.    A blood test  may be done to check for anemia (low iron level).    A Tdap vaccine  may be recommended by your healthcare provider.    A group B strep test  is a test that is done to check for group B strep infection. Group B strep is a type of bacteria that may be found in the vagina or rectum. It can be passed to your baby during delivery if you have it. Your healthcare provider will take swab your vagina or rectum and send the sample to the lab for tests.    Fundal height  is a measurement of your uterus to check your baby's growth. This number is usually the same as the number of weeks that you have been pregnant. Your healthcare provider may also check your baby's position.    Your baby's heart rate  will be checked.    Follow up with your obstetrician as directed:  Write down your questions so you remember to ask them during your visits.  © Copyright [employer] 2023 Information is for End User's use only and may not be sold, redistributed or otherwise used for commercial purposes.  The above information is an  only. It is not intended as medical advice for individual conditions or treatments. Talk to your doctor, nurse or pharmacist before following any medical regimen to see if it is safe and effective for you.  Kick Counts in Pregnancy   AMBULATORY CARE:   Kick counts  measure how much your baby is moving in your womb. A kick from your baby can be felt as a twist, turn, swish, roll, or jab. It is common to feel your baby kicking at 26 to 28 weeks of pregnancy. You may feel your baby kick as early as 20 weeks of pregnancy. You may want to start counting at 28 weeks.   Contact your doctor immediately if:   You feel a change in the number of kicks or movements of your baby.     You feel fewer than 10 kicks within 2 hours.     You have questions or concerns about your baby's movements.    Why measure  kick counts:  Your baby's movement may provide information about your baby's health. He or she may move less, or not at all, if there are problems. Your baby may move less if he or she is not getting enough oxygen or nutrition from the placenta. Do not smoke while you are pregnant. Smoking decreases the amount of oxygen that gets to your baby. Talk to your healthcare provider if you need help to quit smoking. Tell your healthcare provider as soon as you feel a change in your baby's movements.  When to measure kick counts:   Measure kick counts at the same time every day.      Measure kick counts when your baby is awake and most active. Your baby may be most active in the evening.    How to measure kick counts:  Check that your baby is awake before you measure kick counts. You can wake up your baby by lightly pushing on your belly, walking, or drinking something cold. Your healthcare provider may tell you different ways to measure kick counts. You may be told to do the following:  Use a chart or clock to keep track of the time you start and finish counting.     Sit in a chair or lie on your left side.     Place your hands on the largest part of your belly.     Count until you reach 10 kicks. Write down how much time it takes to count 10 kicks.     It may take 30 minutes to 2 hours to count 10 kicks. It should not take more than 2 hours to count 10 kicks.    Follow up with your doctor as directed:  Write down your questions so you remember to ask them during your visits.   © Copyright [employer] 2023 Information is for End User's use only and may not be sold, redistributed or otherwise used for commercial purposes.  The above information is an  only. It is not intended as medical advice for individual conditions or treatments. Talk to your doctor, nurse or pharmacist before following any medical regimen to see if it is safe and effective for you.      Perineal Massage    Perineal massage is recommended  starting after 34 weeks in order to reduce risks of perineal tearing during childbirth.  You have been provided and instructional sheet in your yellow 28 week prenatal packet.       Having Your Baby: The Labor Process   AMBULATORY CARE:   The labor process  is a series of 3 stages that your body goes through to deliver your baby. It is not known for sure what causes labor to begin. Hormones made by you and your baby and changes in your uterus may help labor to start. Labor usually starts 2 weeks before or after your due date. Most women do not have their baby exactly on their due date.   Call your obstetrician if:   You have vaginal spotting or bleeding.    Your water broke or you feel warm water gushing or trickling from your vagina.    You have more than 5 contractions in 1 hour.    You have bloody mucus or show.    You notice any changes in your baby's movements.     You have abdominal cramps, pressure, or tightening.    You have a change in vaginal discharge.    You have questions or concerns about your condition or care.    First stage of labor:  The first stage of labor includes latent (early) labor and active labor. This stage may last up to 12 hours if this is your first pregnancy. It may last up to 10 hours if you delivered a baby before. Your uterus will contract to prepare your cervix for delivery and to push your baby out of the birth canal. Your cervix will dilate (widen) and efface (soften and become thinner). Your contractions may last from 30 to 60 seconds. The contractions usually start in the back and move to the front. You may also have a pink, clear, or slightly bloody discharge called bloody show. Bloody show is caused by the movement of a mucus plug from your cervix. During pregnancy the mucus plug blocks your cervix to prevent it from opening  What to do during early labor:  Early labor may last for several hours. You will most likely be at home during early labor. Rest as much as possible  while you are at home. Have someone rub your back. It may be helpful to place ice packs on your lower back. Go for a short walk if you are able. Drink water and suck on ice chips. Ask your healthcare provider if it is okay to eat during early labor.   How to know when you are in active labor:  This stage may last up to 12 hours if this is your first pregnancy. It may last up to 10 hours if you delivered a baby before. Your contractions will get stronger, last longer, and happen more frequently. They will also become more intense and painful. Time your contractions from the beginning of one to the beginning of the next. Write this information down for 1 hour. Your healthcare provider will tell you when to go to the hospital or birthing center. This will be based on how many minutes apart your contractions are.   Second stage of labor:  The second stage is the time between full cervix dilation and the birth of your baby. Your cervix will be completely dilated to 10 centimeters and your baby will be ready to be born. The second stage usually lasts 20 minutes to 2 hours. It may last up to 3 hours if this is your first baby.  You may be given antibiotics to fight a bacterial infection you have or prevent an infection during delivery.    Healthcare providers will help you find a position for giving birth that is comfortable for you. You can lie on your back, have your feet up in stirrups, or squat.    You may feel pressure on your rectum and the urge to push. This pressure is caused by the movement of your baby's head down the birth canal. Your healthcare provider will have you push when you feel the urge. He or she will guide your baby out of the birth canal. Forceps or suction may be used to help deliver your baby. You may also need an episiotomy (incision) to make the vaginal opening larger. This will make more room for your baby. Your perineum will be protected during delivery. This may be with a warm compress or  massage of the area.    At least 1 minute after your baby is born, your healthcare provider will put clamps on the umbilical cord. The cord will then be cut. Your baby may be placed on your chest right away. He or she may also start breastfeeding.    Third stage of labor:  The placenta (afterbirth) is delivered during this stage. After you give birth, your uterus will continue to contract to help push out the placenta. These contractions will begin 5 to 30 minutes after you give birth. Your healthcare provider will tell you when to push. You may have chills or shakiness during this stage. You may be given medicine to help prevent heavy bleeding that can happen during this stage.  How to manage labor pain:  Pain can be managed naturally or with medicines. You can naturally manage pain by using relaxation methods and controlled breathing. There are different types of medicines that can be used to relieve pain while you are in labor. These medicines may be given through an IV or an epidural (thin catheter in your lower back). Talk with your healthcare about your options for pain medicines if you choose to use them. Tell your provider if you prefer not to have any pain control medicines during labor.   © Copyright [employer] 2023 Information is for End User's use only and may not be sold, redistributed or otherwise used for commercial purposes.  The above information is an  only. It is not intended as medical advice for individual conditions or treatments. Talk to your doctor, nurse or pharmacist before following any medical regimen to see if it is safe and effective for you.

## 2024-06-12 ENCOUNTER — ROUTINE PRENATAL (OUTPATIENT)
Dept: OBGYN CLINIC | Facility: CLINIC | Age: 34
End: 2024-06-12
Payer: COMMERCIAL

## 2024-06-12 VITALS
HEART RATE: 102 BPM | BODY MASS INDEX: 44.2 KG/M2 | OXYGEN SATURATION: 97 % | HEIGHT: 66 IN | DIASTOLIC BLOOD PRESSURE: 70 MMHG | WEIGHT: 275 LBS | SYSTOLIC BLOOD PRESSURE: 120 MMHG

## 2024-06-12 DIAGNOSIS — Z3A.34 34 WEEKS GESTATION OF PREGNANCY: Primary | ICD-10-CM

## 2024-06-12 DIAGNOSIS — Z87.59 HISTORY OF GESTATIONAL HYPERTENSION: ICD-10-CM

## 2024-06-12 LAB
SL AMB  POCT GLUCOSE, UA: NORMAL
SL AMB POCT URINE PROTEIN: NORMAL

## 2024-06-12 PROCEDURE — 81002 URINALYSIS NONAUTO W/O SCOPE: CPT | Performed by: OBSTETRICS & GYNECOLOGY

## 2024-06-12 PROCEDURE — PNV: Performed by: OBSTETRICS & GYNECOLOGY

## 2024-06-12 NOTE — PROGRESS NOTES
Problem   34 Weeks Gestation of Pregnancy    Blood Type: A positive. Antibody negative  Pap 2021. Neg/neg   GC/CT -  neg  PN1 Labs- wnl    28 Week Labs- ordered last visit, TSH added, encourage to do today  COVID vaccine- x 2   Flu vaccine - completed  Blue folder- reviewed   TWG 9.072 kg (20 lb)    Genetic screening- NIPT low  AFP- neg  Level 1- 24  Level 2- 3/12  FG on     Yellow folder- reviewed  TDAP - 24  Delivery consent- signed  Breast pump - ordered  Pediatrician - has    Perineal massage -  GBS swab -   IOL -     History of Gestational Hypertension    Nml baseline preeclamptic labs. Started on ASA       34 weeks gestation of pregnancy  Rachel Doe is a 34 y.o.   34w4d who presents for routine PNV.  Taking ASA, Levo, PNV daily , advised to stop ASA at 36 weeks   28 week labs reviewed: NML  TWG 12.7 kg (28 lb)   Denies OB complaints. Good fetal movement. Denies contractions, cramping, leakage of fluid or vaginal bleeding.   Tdap vaccine completed  Reviewed  labor precautions and FKCs.   Advised to start Perineal massage at 34-36 weeks   Pregnancy Essential guide and Baby and Me web site recommended       History of gestational hypertension  Normotensive today   No signs of Pre E

## 2024-06-12 NOTE — PROGRESS NOTES
34w4d  Pap 2021. Neg/Neg  GC/CT:2024 Neg/Neg   PN1 Labs: 2024  Blood Type: A Positive :   MFM Level 1:2024  MFM Level 2:3/12/2024  AFP: 2024  28 Week Labs:2024 Normal   TDap:2024  Flu:  GBS:   Blue Folder: given   Yellow Folder: given  Ped Referral : given   Breast pump:  L&D forms: done   Delivery consent: done     Patient reports positive fetal movements with no concerns at today's visit .

## 2024-06-19 ENCOUNTER — ULTRASOUND (OUTPATIENT)
Dept: PERINATAL CARE | Facility: OTHER | Age: 34
End: 2024-06-19
Payer: COMMERCIAL

## 2024-06-19 VITALS
WEIGHT: 277.6 LBS | SYSTOLIC BLOOD PRESSURE: 110 MMHG | DIASTOLIC BLOOD PRESSURE: 70 MMHG | HEIGHT: 66 IN | HEART RATE: 120 BPM | BODY MASS INDEX: 44.61 KG/M2

## 2024-06-19 DIAGNOSIS — O99.213 SEVERE OBESITY DUE TO EXCESS CALORIES AFFECTING PREGNANCY IN THIRD TRIMESTER (HCC): Primary | ICD-10-CM

## 2024-06-19 DIAGNOSIS — O99.283 HYPOTHYROIDISM DURING PREGNANCY IN THIRD TRIMESTER: ICD-10-CM

## 2024-06-19 DIAGNOSIS — O35.EXX0 FETAL RENAL ANOMALY, SINGLE GESTATION: ICD-10-CM

## 2024-06-19 DIAGNOSIS — E66.01 SEVERE OBESITY DUE TO EXCESS CALORIES AFFECTING PREGNANCY IN THIRD TRIMESTER (HCC): Primary | ICD-10-CM

## 2024-06-19 DIAGNOSIS — E03.9 HYPOTHYROIDISM DURING PREGNANCY IN THIRD TRIMESTER: ICD-10-CM

## 2024-06-19 DIAGNOSIS — Z87.59 HISTORY OF GESTATIONAL HYPERTENSION: ICD-10-CM

## 2024-06-19 DIAGNOSIS — O09.523 MULTIGRAVIDA OF ADVANCED MATERNAL AGE IN THIRD TRIMESTER: ICD-10-CM

## 2024-06-19 DIAGNOSIS — Z3A.35 35 WEEKS GESTATION OF PREGNANCY: ICD-10-CM

## 2024-06-19 DIAGNOSIS — Z36.89 ENCOUNTER FOR ULTRASOUND TO CHECK FETAL GROWTH: ICD-10-CM

## 2024-06-19 PROCEDURE — 76816 OB US FOLLOW-UP PER FETUS: CPT | Performed by: STUDENT IN AN ORGANIZED HEALTH CARE EDUCATION/TRAINING PROGRAM

## 2024-06-19 PROCEDURE — 59025 FETAL NON-STRESS TEST: CPT | Performed by: STUDENT IN AN ORGANIZED HEALTH CARE EDUCATION/TRAINING PROGRAM

## 2024-06-19 PROCEDURE — 99213 OFFICE O/P EST LOW 20 MIN: CPT | Performed by: STUDENT IN AN ORGANIZED HEALTH CARE EDUCATION/TRAINING PROGRAM

## 2024-06-19 NOTE — PROGRESS NOTES
[facility]: Ms. Doe was seen today for NST (found under the pregnancy episode) which I reviewed the RN assessment and agree, and fetal growth ultrasound (see ultrasound report under OB procedures tab).         MDM:   I. Diagnoses/Problems addressed:  Fetal renal pelvis dilation  II.  Data:  kiarra, prenatal note   III.  Risk of morbidity: Low    Please don't hesitate to contact our office with any concerns or questions.  -Annelise Head MD

## 2024-06-19 NOTE — PROGRESS NOTES
Repeat Non-Stress Testing:    Patient verbalizes +FM. Pt denies ALL:               Leaking of fluid   Contractions   Vaginal bleeding   Decreased fetal movement    Patient is performing daily kick counts. Patient has no questions or concerns.   NST strip reviewed by Dr. Head.

## 2024-06-21 NOTE — PROGRESS NOTES
35w6d  Pap 2021. Negative HPV Negative  GC/CT:2024 Negative HPV Negative   PN1 Labs: 2024  Blood Type: A  Positive:   MFM Level 1:2024  MFM Level 2: 3/12/2024  AFP: 2024  28 Week Labs:2024 Normal   TDap:2024  Flu:  GBS:   Blue Folder: Given   Yellow Folder: Given   Ped Referral : Already has a Peds .  Breast pump: was order for patient   L&D forms: Done   Delivery consent: Done

## 2024-06-24 NOTE — PATIENT INSTRUCTIONS
Pregnancy at 35 to 38 Weeks   AMBULATORY CARE:   Changes happening with your body:  You are considered full term at the beginning of 37 weeks. Your breathing may be easier if your baby has moved down into a head-down position. You may need to urinate more often because the baby may be pressing on your bladder. You may also feel more discomfort and get tired easily.   Seek care immediately if:   You develop a severe headache that does not go away.    You have new or increased vision changes, such as blurred or spotted vision.    You have new or increased swelling in your face or hands.    You have vaginal spotting or bleeding.    Your water broke or you feel warm water gushing or trickling from your vagina.    Call your obstetrician if:   You have more than 5 contractions in 1 hour.    You notice any changes in your baby's movements.    You have abdominal cramps, pressure, or tightening.    You have a change in vaginal discharge.    You have chills or a fever.    You have vaginal itching, burning, or pain.    You have yellow, green, white, or foul-smelling vaginal discharge.    You have pain or burning when you urinate, less urine than usual, or pink or bloody urine.    You have questions or concerns about your condition or care.    How to care for yourself at this stage of your pregnancy:       Eat a variety of healthy foods.  Healthy foods include fruits, vegetables, whole-grain breads, low-fat dairy foods, beans, lean meats, and fish. Drink liquids as directed. Ask how much liquid to drink each day and which liquids are best for you. Limit caffeine to less than 200 milligrams each day. Limit your intake of fish to 2 servings each week. Choose fish low in mercury such as canned light tuna, shrimp, salmon, cod, or tilapia. Do not  eat fish high in mercury such as swordfish, tilefish, roland mackerel, and shark.         Take prenatal vitamins as directed.  Your need for certain vitamins and minerals, such as folic  acid, increases during pregnancy. Prenatal vitamins provide some of the extra vitamins and minerals you need. Prenatal vitamins may also help to decrease the risk of certain birth defects.         Rest as needed.  Put your feet up if you have swelling in your ankles and feet.         Talk to your healthcare provider about exercise.  Moderate exercise can help you stay fit. Your healthcare provider will help you plan an exercise program that is safe for you during pregnancy.         Do not smoke.  Smoking increases your risk of a miscarriage and other health problems during your pregnancy. Smoking can cause your baby to be born early or weigh less at birth. Ask your healthcare provider for information if you need help quitting.    Do not drink alcohol.  Alcohol passes from your body to your baby through the placenta. It can affect your baby's brain development and cause fetal alcohol syndrome (FAS). FAS is a group of conditions that causes mental, behavior, and growth problems.    Talk to your healthcare provider before you take any medicines.  Many medicines may harm your baby if you take them when you are pregnant. Do not take any medicines, vitamins, herbs, or supplements without first talking to your healthcare provider. Never use illegal or street drugs (such as marijuana or cocaine) while you are pregnant.  Safety tips during pregnancy:   Avoid hot tubs and saunas.  Do not use a hot tub or sauna while you are pregnant, especially during your first trimester. Hot tubs and saunas may raise your baby's temperature and increase the risk of birth defects.    Avoid toxoplasmosis.  This is an infection caused by eating raw meat or being around infected cat feces. It can cause birth defects, miscarriages, and other problems. Wash your hands after you touch raw meat. Make sure any meat is well-cooked before you eat it. Avoid raw eggs and unpasteurized milk. Use gloves or ask someone else to clean your cat's litter box  while you are pregnant.         Ask your healthcare provider about travel.  The most comfortable time to travel is during the second trimester. Ask your provider if you can travel after 36 weeks. You may not be able to travel in an airplane after 36 weeks. He or she may also recommend you avoid long road trips.    Changes happening with your baby:  By 38 weeks, your baby may weigh between 6 and 9 pounds. Your baby may be about 14 inches long from the top of the head to the rump (baby's bottom). Your baby hears well enough to know your voice. As your baby gets larger, you may feel fewer kicks and more stretching and rolling. Your baby may move into a head-down position. Your baby will also rest lower in your abdomen.  What you need to know about prenatal care:  Your healthcare provider will check your blood pressure and weight. You may also need the following:  A urine test  may also be done to check for sugar and protein. These can be signs of gestational diabetes or infection. Protein in your urine may also be a sign of preeclampsia. Preeclampsia is a condition that can develop during week 20 or later of your pregnancy. It causes high blood pressure, and it can cause problems with your kidneys and other organs.    A gestational diabetes screen  may be done. Your healthcare provider may order either a 1-step or 2-step oral glucose tolerance test (OGTT).     1-step OGTT:  Your blood sugar level will be tested after you have not eaten for 8 hours (fasting). You will then be given a glucose drink. Your level will be tested again 1 hour and 2 hours after you finish the drink.    2-step OGTT:  You do not have to fast for the first part of the test. You will have the glucose drink at any time of day. Your blood sugar level will be checked 1 hour later. If your blood sugar is higher than a certain level, another test will be ordered. You will fast and your blood sugar level will be tested. You will have the glucose drink.  Your blood will be tested again 1 hour, 2 hours, and 3 hours after you finish the glucose drink.    A blood test  may be done to check for anemia (low iron level).    A Tdap vaccine  may be recommended by your healthcare provider.    A group B strep test  is a test that is done to check for group B strep infection. Group B strep is a type of bacteria that may be found in the vagina or rectum. It can be passed to your baby during delivery if you have it. Your healthcare provider will take swab your vagina or rectum and send the sample to the lab for tests.    Fundal height  is a measurement of your uterus to check your baby's growth. This number is usually the same as the number of weeks that you have been pregnant. Your healthcare provider may also check your baby's position.    Your baby's heart rate  will be checked.    Follow up with your obstetrician as directed:  Write down your questions so you remember to ask them during your visits.  © Copyright [employer] 2023 Information is for End User's use only and may not be sold, redistributed or otherwise used for commercial purposes.  The above information is an  only. It is not intended as medical advice for individual conditions or treatments. Talk to your doctor, nurse or pharmacist before following any medical regimen to see if it is safe and effective for you.

## 2024-06-25 ENCOUNTER — ROUTINE PRENATAL (OUTPATIENT)
Age: 34
End: 2024-06-25
Payer: COMMERCIAL

## 2024-06-25 VITALS
DIASTOLIC BLOOD PRESSURE: 72 MMHG | SYSTOLIC BLOOD PRESSURE: 130 MMHG | BODY MASS INDEX: 45.32 KG/M2 | WEIGHT: 282 LBS | HEIGHT: 66 IN

## 2024-06-25 DIAGNOSIS — E03.9 HYPOTHYROIDISM DURING PREGNANCY IN THIRD TRIMESTER: ICD-10-CM

## 2024-06-25 DIAGNOSIS — O99.213 OBESITY AFFECTING PREGNANCY IN THIRD TRIMESTER, UNSPECIFIED OBESITY TYPE: ICD-10-CM

## 2024-06-25 DIAGNOSIS — O99.283 HYPOTHYROIDISM DURING PREGNANCY IN THIRD TRIMESTER: ICD-10-CM

## 2024-06-25 DIAGNOSIS — Z34.83 PRENATAL CARE, SUBSEQUENT PREGNANCY, THIRD TRIMESTER: Primary | ICD-10-CM

## 2024-06-25 DIAGNOSIS — O09.523 MULTIGRAVIDA OF ADVANCED MATERNAL AGE IN THIRD TRIMESTER: ICD-10-CM

## 2024-06-25 DIAGNOSIS — Z3A.36 36 WEEKS GESTATION OF PREGNANCY: ICD-10-CM

## 2024-06-25 LAB
SL AMB  POCT GLUCOSE, UA: NORMAL
SL AMB POCT URINE PROTEIN: NORMAL

## 2024-06-25 PROCEDURE — PNV: Performed by: NURSE PRACTITIONER

## 2024-06-25 PROCEDURE — 87150 DNA/RNA AMPLIFIED PROBE: CPT | Performed by: NURSE PRACTITIONER

## 2024-06-25 PROCEDURE — 81002 URINALYSIS NONAUTO W/O SCOPE: CPT | Performed by: NURSE PRACTITIONER

## 2024-06-25 NOTE — PROGRESS NOTES
Problem   36 Weeks Gestation of Pregnancy    Blood Type: A positive. Antibody negative  Pap 07/26/2021. Neg/neg   GC/CT -  neg  PN1 Labs- wnl    28 Week Labs- nml, TSH nml  COVID vaccine- x 2   Flu vaccine - completed  Blue folder- reviewed     TWG 15.9 kg (35 lb)    Genetic screening- NIPT low  AFP- neg  Level 1- 1/19/24  Level 2- 3/12  FG on 5/7  NSTs and SONIA twice weekly    Yellow folder- reviewed  TDAP - 4/30/24  Delivery consent- signed  Breast pump - ordered  Pediatrician - has    Perineal massage - reinforced  GBS swab - done 6/25  IOL -       36 weeks gestation of pregnancy  She feels well. She denies LOF/CTX/VB. She did not voice any concerns. Discussed fetal kick counting.  She was encouraged to continue with her perineal/vaginal massages to prevent lacerations during the labor process. Discussed mindful eating, staying active and avoiding salt intake. Advised to increase fluids on the warmer days.    Overview charting updated today.

## 2024-06-25 NOTE — ASSESSMENT & PLAN NOTE
She feels well. She denies LOF/CTX/VB. She did not voice any concerns. Discussed fetal kick counting.  She was encouraged to continue with her perineal/vaginal massages to prevent lacerations during the labor process. Discussed mindful eating, staying active and avoiding salt intake. Advised to increase fluids on the warmer days.    Overview charting updated today.

## 2024-06-26 ENCOUNTER — ULTRASOUND (OUTPATIENT)
Dept: PERINATAL CARE | Facility: OTHER | Age: 34
End: 2024-06-26
Payer: COMMERCIAL

## 2024-06-26 VITALS
HEIGHT: 66 IN | WEIGHT: 283.2 LBS | DIASTOLIC BLOOD PRESSURE: 78 MMHG | HEART RATE: 108 BPM | BODY MASS INDEX: 45.51 KG/M2 | SYSTOLIC BLOOD PRESSURE: 128 MMHG

## 2024-06-26 DIAGNOSIS — Z3A.36 36 WEEKS GESTATION OF PREGNANCY: ICD-10-CM

## 2024-06-26 DIAGNOSIS — O99.213 SEVERE OBESITY DUE TO EXCESS CALORIES AFFECTING PREGNANCY IN THIRD TRIMESTER (HCC): Primary | ICD-10-CM

## 2024-06-26 DIAGNOSIS — E66.01 SEVERE OBESITY DUE TO EXCESS CALORIES AFFECTING PREGNANCY IN THIRD TRIMESTER (HCC): Primary | ICD-10-CM

## 2024-06-26 DIAGNOSIS — Z3A.34 34 WEEKS GESTATION OF PREGNANCY: ICD-10-CM

## 2024-06-26 DIAGNOSIS — O35.EXX0 FETAL RENAL ANOMALY, SINGLE GESTATION: ICD-10-CM

## 2024-06-26 PROCEDURE — 99213 OFFICE O/P EST LOW 20 MIN: CPT | Performed by: OBSTETRICS & GYNECOLOGY

## 2024-06-26 PROCEDURE — 76815 OB US LIMITED FETUS(S): CPT | Performed by: OBSTETRICS & GYNECOLOGY

## 2024-06-26 PROCEDURE — 59025 FETAL NON-STRESS TEST: CPT | Performed by: OBSTETRICS & GYNECOLOGY

## 2024-06-26 NOTE — LETTER
"2024     Sosa Lee DO  [address] [facility] [id number]    Patient: Rachel Doe   YOB: 1990   Date of Visit: 2024       Dear Dr. Lee:    Thank you for referring Rachel Doe to me for evaluation. Below are my notes for this consultation.    If you have questions, please do not hesitate to call me. I look forward to following your patient along with you.         Sincerely,        Nichol Carmona MD        CC: No Recipients    Nichol Carmona MD  2024 10:46 AM  Sign when Signing Visit  [facility]: Rachel Doe was seen today for NST (found under the pregnancy episode) which I reviewed the RN assessment and agree, and SONIA (see ultrasound report under OB procedures tab).  See ultrasound report under \"OB Procedures\" tab.         Rachel presents today for scheduled weekly NST/SONIA in the setting of maternal obesity based on a pregravid BMI of 39.  Her pregnancy is also complicated by advanced maternal age and prior gestational hypertension.    Today her initial blood pressure was 140/70 and on repeat was 128/78    An NST is reactive in our office.  SONIA is normal on ultrasound    Evaluation and management:    Rachel denies any current headache or visual disturbances.  She has occasionally had white spots in her vision when turning her head quickly but that is not present for some time.  She regularly checks her blood pressure at home when this occurs and they have always been normal.  He denies any epigastric or right upper quadrant pain.  She does have significant lower extremity edema.    She is familiar with the signs and symptoms of preeclampsia given her prior diagnosis of gestational hypertension.  Given this was her initially elevated blood pressure of 140/70 after 20 weeks gestation and was normal on repeat I did not obtain labs today. She had 1 additional elevated 1 in the first trimester but given this was in isolation I " do not strongly suspect underlying chronic hypertension.      I advised her to start taking her blood pressure daily at home and to report to her OB if she has any that are greater than 140 systolic or greater than 90 diastolic.    The time spent on this established patient on the encounter date included 5 minutes previsit service time reviewing records and precharting, 8 minutes face-to-face service time counseling regarding results and coordinating care, and  5 minutes charting, totalling 18 minutes.  Please don't hesitate to contact our office with any concerns or questions.  -Nichol Carmona MD

## 2024-06-26 NOTE — PROGRESS NOTES
Repeat Non-Stress Testing:    Patient verbalizes +FM. Pt denies ALL:               Leaking of fluid   Contractions   Vaginal bleeding   Decreased fetal movement    Patient is performing daily kick counts. Patient has no questions or concerns.   NST strip reviewed by Dr. Carmona.     Patient has no toxic complaints only c/o feet edema.

## 2024-06-26 NOTE — PATIENT INSTRUCTIONS
Kick Counts in Pregnancy   AMBULATORY CARE:   Kick counts  measure how much your baby is moving in your womb. A kick from your baby can be felt as a twist, turn, swish, roll, or jab. It is common to feel your baby kicking at 26 to 28 weeks of pregnancy. You may feel your baby kick as early as 20 weeks of pregnancy. You may want to start counting at 28 weeks.   Contact your doctor immediately if:   You feel a change in the number of kicks or movements of your baby.      You feel fewer than 10 kicks within 2 hours.      You have questions or concerns about your baby's movements.     Why measure kick counts:  Your baby's movement may provide information about your baby's health. He or she may move less, or not at all, if there are problems. Your baby may move less if he or she is not getting enough oxygen or nutrition from the placenta. Do not smoke while you are pregnant. Smoking decreases the amount of oxygen that gets to your baby. Talk to your healthcare provider if you need help to quit smoking. Tell your healthcare provider as soon as you feel a change in your baby's movements.  When to measure kick counts:   Measure kick counts at the same time every day.       Measure kick counts when your baby is awake and most active. Your baby may be most active in the evening.     How to measure kick counts:  Check that your baby is awake before you measure kick counts. You can wake up your baby by lightly pushing on your belly, walking, or drinking something cold. Your healthcare provider may tell you different ways to measure kick counts. You may be told to do the following:  Use a chart or clock to keep track of the time you start and finish counting.      Sit in a chair or lie on your left side.      Place your hands on the largest part of your belly.      Count until you reach 10 kicks. Write down how much time it takes to count 10 kicks.      It may take 30 minutes to 2 hours to count 10 kicks. It should not take more  than 2 hours to count 10 kicks.     Follow up with your doctor as directed:  Write down your questions so you remember to ask them during your visits.   © Copyright [employer] 2023 Information is for End User's use only and may not be sold, redistributed or otherwise used for commercial purposes.  The above information is an  only. It is not intended as medical advice for individual conditions or treatments. Talk to your doctor, nurse or pharmacist before following any medical regimen to see if it is safe and effective for you.

## 2024-06-26 NOTE — PROGRESS NOTES
"[facility]: Rachel Doe was seen today for NST (found under the pregnancy episode) which I reviewed the RN assessment and agree, and SONIA (see ultrasound report under OB procedures tab).  See ultrasound report under \"OB Procedures\" tab.         Rachel presents today for scheduled weekly NST/SONIA in the setting of maternal obesity based on a pregravid BMI of 39.  Her pregnancy is also complicated by advanced maternal age and prior gestational hypertension.    Today her initial blood pressure was 140/70 and on repeat was 128/78    An NST is reactive in our office.  SONIA is normal on ultrasound    Evaluation and management:    Rachel denies any current headache or visual disturbances.  She has occasionally had white spots in her vision when turning her head quickly but that is not present for some time.  She regularly checks her blood pressure at home when this occurs and they have always been normal.  He denies any epigastric or right upper quadrant pain.  She does have significant lower extremity edema.    She is familiar with the signs and symptoms of preeclampsia given her prior diagnosis of gestational hypertension.  Given this was her initially elevated blood pressure of 140/70 after 20 weeks gestation and was normal on repeat I did not obtain labs today. She had 1 additional elevated 1 in the first trimester but given this was in isolation I do not strongly suspect underlying chronic hypertension.      I advised her to start taking her blood pressure daily at home and to report to her OB if she has any that are greater than 140 systolic or greater than 90 diastolic.    The time spent on this established patient on the encounter date included 5 minutes previsit service time reviewing records and precharting, 8 minutes face-to-face service time counseling regarding results and coordinating care, and  5 minutes charting, totalling 18 minutes.  Please don't hesitate to contact our office with " any concerns or questions.  -Nichol Carmona MD

## 2024-06-27 LAB — GP B STREP DNA SPEC QL NAA+PROBE: NEGATIVE

## 2024-06-28 ENCOUNTER — ANESTHESIA EVENT (INPATIENT)
Dept: ANESTHESIOLOGY | Facility: HOSPITAL | Age: 34
End: 2024-06-28
Payer: COMMERCIAL

## 2024-06-28 ENCOUNTER — TELEPHONE (OUTPATIENT)
Age: 34
End: 2024-06-28

## 2024-06-28 ENCOUNTER — ANESTHESIA (INPATIENT)
Dept: ANESTHESIOLOGY | Facility: HOSPITAL | Age: 34
End: 2024-06-28
Payer: COMMERCIAL

## 2024-06-28 ENCOUNTER — NURSE TRIAGE (OUTPATIENT)
Age: 34
End: 2024-06-28

## 2024-06-28 ENCOUNTER — HOSPITAL ENCOUNTER (INPATIENT)
Facility: HOSPITAL | Age: 34
LOS: 4 days | Discharge: HOME | End: 2024-07-02
Attending: STUDENT IN AN ORGANIZED HEALTH CARE EDUCATION/TRAINING PROGRAM | Admitting: STUDENT IN AN ORGANIZED HEALTH CARE EDUCATION/TRAINING PROGRAM
Payer: COMMERCIAL

## 2024-06-28 DIAGNOSIS — Z3A.36 36 WEEKS GESTATION OF PREGNANCY: Primary | ICD-10-CM

## 2024-06-28 DIAGNOSIS — O14.13 PRE-ECLAMPSIA, SEVERE, ANTEPARTUM, THIRD TRIMESTER: ICD-10-CM

## 2024-06-28 PROBLEM — Z34.90 ENCOUNTER FOR INDUCTION OF LABOR: Status: ACTIVE | Noted: 2024-06-28

## 2024-06-28 PROBLEM — R03.0 ELEVATED BLOOD PRESSURE READING IN OFFICE WITHOUT DIAGNOSIS OF HYPERTENSION: Status: RESOLVED | Noted: 2024-01-20 | Resolved: 2024-06-28

## 2024-06-28 PROBLEM — O16.9 ELEVATED BLOOD PRESSURE COMPLICATING PREGNANCY, ANTEPARTUM: Status: ACTIVE | Noted: 2024-06-28

## 2024-06-28 LAB
ABO GROUP BLD: NORMAL
ALBUMIN SERPL BCG-MCNC: 3.4 G/DL (ref 3.5–5)
ALP SERPL-CCNC: 119 U/L (ref 34–104)
ALT SERPL W P-5'-P-CCNC: 9 U/L (ref 7–52)
ANION GAP SERPL CALCULATED.3IONS-SCNC: 8 MMOL/L (ref 4–13)
AST SERPL W P-5'-P-CCNC: 39 U/L (ref 13–39)
BILIRUB SERPL-MCNC: 0.16 MG/DL (ref 0.2–1)
BLD GP AB SCN SERPL QL: NEGATIVE
BUN SERPL-MCNC: 6 MG/DL (ref 5–25)
CALCIUM ALBUM COR SERPL-MCNC: 9.6 MG/DL (ref 8.3–10.1)
CALCIUM SERPL-MCNC: 9.1 MG/DL (ref 8.4–10.2)
CHLORIDE SERPL-SCNC: 107 MMOL/L (ref 96–108)
CO2 SERPL-SCNC: 19 MMOL/L (ref 21–32)
CREAT SERPL-MCNC: 0.48 MG/DL (ref 0.6–1.3)
CREAT UR-MCNC: 26.4 MG/DL
ERYTHROCYTE [DISTWIDTH] IN BLOOD BY AUTOMATED COUNT: 14.7 % (ref 11.6–15.1)
GFR SERPL CREATININE-BSD FRML MDRD: 127 ML/MIN/1.73SQ M
GLUCOSE SERPL-MCNC: 89 MG/DL (ref 65–140)
HCT VFR BLD AUTO: 36.3 % (ref 34.8–46.1)
HGB BLD-MCNC: 11.8 G/DL (ref 11.5–15.4)
MCH RBC QN AUTO: 29.3 PG (ref 26.8–34.3)
MCHC RBC AUTO-ENTMCNC: 32.5 G/DL (ref 31.4–37.4)
MCV RBC AUTO: 90 FL (ref 82–98)
PLATELET # BLD AUTO: 209 THOUSANDS/UL (ref 149–390)
PMV BLD AUTO: 11.1 FL (ref 8.9–12.7)
POTASSIUM SERPL-SCNC: 5.1 MMOL/L (ref 3.5–5.3)
PROT SERPL-MCNC: 6.4 G/DL (ref 6.4–8.4)
PROT UR-MCNC: 7 MG/DL
PROT/CREAT UR: 0.27 MG/G{CREAT} (ref 0–0.1)
RBC # BLD AUTO: 4.03 MILLION/UL (ref 3.81–5.12)
RH BLD: POSITIVE
SODIUM SERPL-SCNC: 134 MMOL/L (ref 135–147)
SPECIMEN EXPIRATION DATE: NORMAL
TREPONEMA PALLIDUM IGG+IGM AB [PRESENCE] IN SERUM OR PLASMA BY IMMUNOASSAY: NORMAL
WBC # BLD AUTO: 8.67 THOUSAND/UL (ref 4.31–10.16)

## 2024-06-28 PROCEDURE — 80053 COMPREHEN METABOLIC PANEL: CPT

## 2024-06-28 PROCEDURE — 86850 RBC ANTIBODY SCREEN: CPT | Performed by: STUDENT IN AN ORGANIZED HEALTH CARE EDUCATION/TRAINING PROGRAM

## 2024-06-28 PROCEDURE — 86900 BLOOD TYPING SEROLOGIC ABO: CPT | Performed by: STUDENT IN AN ORGANIZED HEALTH CARE EDUCATION/TRAINING PROGRAM

## 2024-06-28 PROCEDURE — 4A1HXCZ MONITORING OF PRODUCTS OF CONCEPTION, CARDIAC RATE, EXTERNAL APPROACH: ICD-10-PCS | Performed by: STUDENT IN AN ORGANIZED HEALTH CARE EDUCATION/TRAINING PROGRAM

## 2024-06-28 PROCEDURE — 82570 ASSAY OF URINE CREATININE: CPT

## 2024-06-28 PROCEDURE — NC001 PR NO CHARGE: Performed by: STUDENT IN AN ORGANIZED HEALTH CARE EDUCATION/TRAINING PROGRAM

## 2024-06-28 PROCEDURE — 99213 OFFICE O/P EST LOW 20 MIN: CPT

## 2024-06-28 PROCEDURE — 86901 BLOOD TYPING SEROLOGIC RH(D): CPT | Performed by: STUDENT IN AN ORGANIZED HEALTH CARE EDUCATION/TRAINING PROGRAM

## 2024-06-28 PROCEDURE — 84156 ASSAY OF PROTEIN URINE: CPT

## 2024-06-28 PROCEDURE — 85027 COMPLETE CBC AUTOMATED: CPT

## 2024-06-28 PROCEDURE — 10907ZC DRAINAGE OF AMNIOTIC FLUID, THERAPEUTIC FROM PRODUCTS OF CONCEPTION, VIA NATURAL OR ARTIFICIAL OPENING: ICD-10-PCS | Performed by: STUDENT IN AN ORGANIZED HEALTH CARE EDUCATION/TRAINING PROGRAM

## 2024-06-28 PROCEDURE — 86780 TREPONEMA PALLIDUM: CPT | Performed by: STUDENT IN AN ORGANIZED HEALTH CARE EDUCATION/TRAINING PROGRAM

## 2024-06-28 RX ORDER — MAGNESIUM SULFATE HEPTAHYDRATE 40 MG/ML
2 INJECTION, SOLUTION INTRAVENOUS ONCE
Status: COMPLETED | OUTPATIENT
Start: 2024-06-28 | End: 2024-06-28

## 2024-06-28 RX ORDER — EPHEDRINE SULFATE 50 MG/ML
INJECTION INTRAVENOUS AS NEEDED
Status: DISCONTINUED | OUTPATIENT
Start: 2024-06-28 | End: 2024-06-29 | Stop reason: HOSPADM

## 2024-06-28 RX ORDER — LABETALOL HYDROCHLORIDE 5 MG/ML
20 INJECTION, SOLUTION INTRAVENOUS ONCE
Status: COMPLETED | OUTPATIENT
Start: 2024-06-28 | End: 2024-06-28

## 2024-06-28 RX ORDER — BUPIVACAINE HYDROCHLORIDE 2.5 MG/ML
30 INJECTION, SOLUTION EPIDURAL; INFILTRATION; INTRACAUDAL ONCE AS NEEDED
Status: DISCONTINUED | OUTPATIENT
Start: 2024-06-28 | End: 2024-06-29

## 2024-06-28 RX ORDER — MAGNESIUM SULFATE HEPTAHYDRATE 40 MG/ML
2 INJECTION, SOLUTION INTRAVENOUS CONTINUOUS
Status: DISCONTINUED | OUTPATIENT
Start: 2024-06-28 | End: 2024-06-29

## 2024-06-28 RX ORDER — OXYTOCIN/RINGER'S LACTATE 30/500 ML
1-30 PLASTIC BAG, INJECTION (ML) INTRAVENOUS
Status: DISCONTINUED | OUTPATIENT
Start: 2024-06-28 | End: 2024-06-29

## 2024-06-28 RX ORDER — ONDANSETRON 2 MG/ML
4 INJECTION INTRAMUSCULAR; INTRAVENOUS EVERY 4 HOURS PRN
Status: DISCONTINUED | OUTPATIENT
Start: 2024-06-28 | End: 2024-06-29

## 2024-06-28 RX ORDER — ACETAMINOPHEN 325 MG/1
975 TABLET ORAL EVERY 8 HOURS PRN
Status: DISCONTINUED | OUTPATIENT
Start: 2024-06-28 | End: 2024-06-29

## 2024-06-28 RX ORDER — CALCIUM GLUCONATE 94 MG/ML
1 INJECTION, SOLUTION INTRAVENOUS ONCE AS NEEDED
Status: DISCONTINUED | OUTPATIENT
Start: 2024-06-28 | End: 2024-06-29

## 2024-06-28 RX ORDER — LEVOTHYROXINE SODIUM 0.05 MG/1
50 TABLET ORAL
Status: DISCONTINUED | OUTPATIENT
Start: 2024-06-28 | End: 2024-06-29

## 2024-06-28 RX ORDER — LIDOCAINE HYDROCHLORIDE AND EPINEPHRINE 15; 5 MG/ML; UG/ML
INJECTION, SOLUTION EPIDURAL AS NEEDED
Status: DISCONTINUED | OUTPATIENT
Start: 2024-06-28 | End: 2024-06-29 | Stop reason: HOSPADM

## 2024-06-28 RX ORDER — ROPIVACAINE HYDROCHLORIDE 2 MG/ML
INJECTION, SOLUTION EPIDURAL; INFILTRATION; PERINEURAL CONTINUOUS PRN
Status: DISCONTINUED | OUTPATIENT
Start: 2024-06-28 | End: 2024-06-29 | Stop reason: HOSPADM

## 2024-06-28 RX ORDER — MAGNESIUM SULFATE HEPTAHYDRATE 40 MG/ML
4 INJECTION, SOLUTION INTRAVENOUS ONCE
Status: COMPLETED | OUTPATIENT
Start: 2024-06-28 | End: 2024-06-28

## 2024-06-28 RX ORDER — SODIUM CHLORIDE, SODIUM LACTATE, POTASSIUM CHLORIDE, CALCIUM CHLORIDE 600; 310; 30; 20 MG/100ML; MG/100ML; MG/100ML; MG/100ML
125 INJECTION, SOLUTION INTRAVENOUS CONTINUOUS
Status: DISCONTINUED | OUTPATIENT
Start: 2024-06-28 | End: 2024-06-29

## 2024-06-28 RX ORDER — LIDOCAINE HYDROCHLORIDE AND EPINEPHRINE 15; 5 MG/ML; UG/ML
INJECTION, SOLUTION EPIDURAL AS NEEDED
Status: DISCONTINUED | OUTPATIENT
Start: 2024-06-28 | End: 2024-06-28

## 2024-06-28 RX ADMIN — LIDOCAINE HYDROCHLORIDE AND EPINEPHRINE 5 ML: 15; 5 INJECTION, SOLUTION EPIDURAL at 21:32

## 2024-06-28 RX ADMIN — SODIUM CHLORIDE, SODIUM LACTATE, POTASSIUM CHLORIDE, AND CALCIUM CHLORIDE 999 ML/HR: .6; .31; .03; .02 INJECTION, SOLUTION INTRAVENOUS at 21:52

## 2024-06-28 RX ADMIN — MAGNESIUM SULFATE HEPTAHYDRATE 2 G/HR: 40 INJECTION, SOLUTION INTRAVENOUS at 21:21

## 2024-06-28 RX ADMIN — ACETAMINOPHEN 975 MG: 325 TABLET, FILM COATED ORAL at 22:00

## 2024-06-28 RX ADMIN — SODIUM CHLORIDE, SODIUM LACTATE, POTASSIUM CHLORIDE, AND CALCIUM CHLORIDE 50 ML/HR: .6; .31; .03; .02 INJECTION, SOLUTION INTRAVENOUS at 10:25

## 2024-06-28 RX ADMIN — EPHEDRINE SULFATE 10 MG: 50 INJECTION INTRAVENOUS at 21:54

## 2024-06-28 RX ADMIN — ROPIVACAINE HYDROCHLORIDE: 2 INJECTION, SOLUTION EPIDURAL; INFILTRATION at 21:38

## 2024-06-28 RX ADMIN — MAGNESIUM SULFATE HEPTAHYDRATE 2 G/HR: 40 INJECTION, SOLUTION INTRAVENOUS at 11:15

## 2024-06-28 RX ADMIN — Medication 2 MILLI-UNITS/MIN: at 15:10

## 2024-06-28 RX ADMIN — LABETALOL HYDROCHLORIDE 20 MG: 5 INJECTION, SOLUTION INTRAVENOUS at 10:03

## 2024-06-28 RX ADMIN — ROPIVACAINE HYDROCHLORIDE 10 ML/HR: 2 INJECTION EPIDURAL; INFILTRATION; PERINEURAL at 21:34

## 2024-06-28 RX ADMIN — MAGNESIUM SULFATE HEPTAHYDRATE 2 G: 2 INJECTION, SOLUTION INTRAVENOUS at 10:59

## 2024-06-28 RX ADMIN — MAGNESIUM SULFATE HEPTAHYDRATE 4 G: 40 INJECTION, SOLUTION INTRAVENOUS at 10:38

## 2024-06-28 RX ADMIN — EPHEDRINE SULFATE 50 MG: 50 INJECTION INTRAVENOUS at 21:54

## 2024-06-28 NOTE — PLAN OF CARE
Problem: PAIN - ADULT  Goal: Verbalizes/displays adequate comfort level or baseline comfort level  Description: Interventions:  - Encourage patient to monitor pain and request assistance  - Assess pain using appropriate pain scale  - Administer analgesics based on type and severity of pain and evaluate response  - Implement non-pharmacological measures as appropriate and evaluate response  - Consider cultural and social influences on pain and pain management  - Notify physician/advanced practitioner if interventions unsuccessful or patient reports new pain  Outcome: Progressing     Problem: INFECTION - ADULT  Goal: Absence or prevention of progression during hospitalization  Description: INTERVENTIONS:  - Assess and monitor for signs and symptoms of infection  - Monitor lab/diagnostic results  - Monitor all insertion sites, i.e. indwelling lines, tubes, and drains  - Monitor endotracheal if appropriate and nasal secretions for changes in amount and color  - [place] appropriate cooling/warming therapies per order  - Administer medications as ordered  - Instruct and encourage patient and family to use good hand hygiene technique  - Identify and instruct in appropriate isolation precautions for identified infection/condition  Outcome: Progressing  Goal: Absence of fever/infection during neutropenic period  Description: INTERVENTIONS:  - Monitor WBC    Outcome: Progressing     Problem: SAFETY ADULT  Goal: Patient will remain free of falls  Description: INTERVENTIONS:  - Educate patient/family on patient safety including physical limitations  - Instruct patient to call for assistance with activity   - Consult OT/PT to assist with strengthening/mobility   - Keep Call bell within reach  - Keep bed low and locked with side rails adjusted as appropriate  - Keep care items and personal belongings within reach  - Initiate and maintain comfort rounds  - Make Fall Risk Sign visible to staff  - Offer Toileting every 2-3  Hours, in advance of need  - Initiate/Maintain alarm  - Obtain necessary fall risk management equipment:   - Apply yellow socks and bracelet for high fall risk patients  - Consider moving patient to room near nurses station  Outcome: Progressing  Goal: Maintain or return to baseline ADL function  Description: INTERVENTIONS:  -  Assess patient's ability to carry out ADLs; assess patient's baseline for ADL function and identify physical deficits which impact ability to perform ADLs (bathing, care of mouth/teeth, toileting, grooming, dressing, etc.)  - Assess/evaluate cause of self-care deficits   - Assess range of motion  - Assess patient's mobility; develop plan if impaired  - Assess patient's need for assistive devices and provide as appropriate  - Encourage maximum independence but intervene and supervise when necessary  - Involve family in performance of ADLs  - Assess for home care needs following discharge   - Consider OT consult to assist with ADL evaluation and planning for discharge  - Provide patient education as appropriate  Outcome: Progressing  Goal: Maintains/Returns to pre admission functional level  Description: INTERVENTIONS:  - Perform AM-PAC 6 Click Basic Mobility/ Daily Activity assessment daily.  - Set and communicate daily mobility goal to care team and patient/family/caregiver.   - Collaborate with rehabilitation services on mobility goals if consulted  - Out of bed for toileting  - Record patient progress and toleration of activity level   Outcome: Progressing     Problem: Knowledge Deficit  Goal: Patient/family/caregiver demonstrates understanding of disease process, treatment plan, medications, and discharge instructions  Description: Complete learning assessment and assess knowledge base.  Interventions:  - Provide teaching at level of understanding  - Provide teaching via preferred learning methods  Outcome: Progressing  Goal: Verbalizes understanding of labor plan  Description: Assess  patient/family/caregiver's baseline knowledge level and ability to understand information.  Provide education via patient/family/caregiver's preferred learning method at appropriate level of understanding.     1. Provide teaching at level of understanding.  2. Provide teaching via preferred learning method(s).  Outcome: Progressing     Problem: DISCHARGE PLANNING  Goal: Discharge to home or other facility with appropriate resources  Description: INTERVENTIONS:  - Identify barriers to discharge w/patient and caregiver  - Arrange for needed discharge resources and transportation as appropriate  - Identify discharge learning needs (meds, wound care, etc.)  - Arrange for interpretive services to assist at discharge as needed  - Refer to Case Management Department for coordinating discharge planning if the patient needs post-hospital services based on physician/advanced practitioner order or complex needs related to functional status, cognitive ability, or social support system  Outcome: Progressing     Problem: Labor & Delivery  Goal: Manages discomfort  Description: Assess and monitor for signs and symptoms of discomfort.  Assess patient's pain level regularly and per hospital policy.  Administer medications as ordered. Support use of nonpharmacological methods to help control pain such as distraction, imagery, relaxation, and application of heat and cold.  Collaborate with interdisciplinary team and patient to determine appropriate pain management plan.    1. Include patient in decisions related to comfort.  2. Offer non-pharmacological pain management interventions.  3. Report ineffective pain management to physician.  Outcome: Progressing  Goal: Patient vital signs are stable  Description: 1. Assess vital signs - vaginal delivery.  Outcome: Progressing

## 2024-06-28 NOTE — TELEPHONE ENCOUNTER
"35  ,  works at surgery [employer] and coworker took her BP 10 minutes ago 155/102  Patient reports she has been monitoring BP @ home, usual readings 138/80's  Denies headache/ dizziness or visual changes.  Hands and face swelling is new for the over the last week and has notice foot swelling has increased.Denies RUQ pain. Feeling baby move.  NST and SONIA yesterday @ [facility].  Advised to go to triage for evaluation, patients states she feels well to drive and [facility] just 10 minutes away.  [facility]- Dr. Sosa Lee / AN Charge nurse      Reason for Disposition   Pregnant 20 or more weeks or postpartum with Systolic BP >= 140 OR Diastolic >= 90    Answer Assessment - Initial Assessment Questions  1. BLOOD PRESSURE: \"What is the blood pressure?\" \"Did you take at least two measurements 5 minutes apart?\"      155/102 coworker checked  2. ONSET: \"When did you take your blood pressure?\"      10 minutes ago  3. HOW: \"How did you obtain the blood pressure?\" (e.g., visiting nurse, automatic home BP monitor)      Automatic cuff  4. HISTORY: \"Do you have a history of high blood pressure?\"      Had high Bps w/ pregnancy  6. OTHER SYMPTOMS: \"Do you have any symptoms?\" (e.g., headache, chest pain, blurred vision, difficulty breathing, weakness)      Increased swelling   7. PREGNANCY: \"Is there any chance you are pregnant?\" \"When was your last menstrual period?\"      [facility] 24    Protocols used: Blood Pressure - High-ADULT-[place]    "

## 2024-06-28 NOTE — PLAN OF CARE
Problem: PAIN - ADULT  Goal: Verbalizes/displays adequate comfort level or baseline comfort level  Description: Interventions:  - Encourage patient to monitor pain and request assistance  - Assess pain using appropriate pain scale  - Administer analgesics based on type and severity of pain and evaluate response  - Implement non-pharmacological measures as appropriate and evaluate response  - Consider cultural and social influences on pain and pain management  - Notify physician/advanced practitioner if interventions unsuccessful or patient reports new pain  Outcome: Progressing     Problem: INFECTION - ADULT  Goal: Absence or prevention of progression during hospitalization  Description: INTERVENTIONS:  - Assess and monitor for signs and symptoms of infection  - Monitor lab/diagnostic results  - Monitor all insertion sites, i.e. indwelling lines, tubes, and drains  - Monitor endotracheal if appropriate and nasal secretions for changes in amount and color  - [place] appropriate cooling/warming therapies per order  - Administer medications as ordered  - Instruct and encourage patient and family to use good hand hygiene technique  - Identify and instruct in appropriate isolation precautions for identified infection/condition  Outcome: Progressing  Goal: Absence of fever/infection during neutropenic period  Description: INTERVENTIONS:  - Monitor WBC    Outcome: Progressing     Problem: SAFETY ADULT  Goal: Patient will remain free of falls  Description: INTERVENTIONS:  - Educate patient/family on patient safety including physical limitations  - Instruct patient to call for assistance with activity   - Consult OT/PT to assist with strengthening/mobility   - Keep Call bell within reach  - Keep bed low and locked with side rails adjusted as appropriate  - Keep care items and personal belongings within reach  - Initiate and maintain comfort rounds  - Make Fall Risk Sign visible to staff  - Offer Toileting every 2-3  Hours, in advance of need  - Initiate/Maintain alarm  - Obtain necessary fall risk management equipment:   - Apply yellow socks and bracelet for high fall risk patients  - Consider moving patient to room near nurses station  Outcome: Progressing  Goal: Maintain or return to baseline ADL function  Description: INTERVENTIONS:  -  Assess patient's ability to carry out ADLs; assess patient's baseline for ADL function and identify physical deficits which impact ability to perform ADLs (bathing, care of mouth/teeth, toileting, grooming, dressing, etc.)  - Assess/evaluate cause of self-care deficits   - Assess range of motion  - Assess patient's mobility; develop plan if impaired  - Assess patient's need for assistive devices and provide as appropriate  - Encourage maximum independence but intervene and supervise when necessary  - Involve family in performance of ADLs  - Assess for home care needs following discharge   - Consider OT consult to assist with ADL evaluation and planning for discharge  - Provide patient education as appropriate  Outcome: Progressing  Goal: Maintains/Returns to pre admission functional level  Description: INTERVENTIONS:  - Perform AM-PAC 6 Click Basic Mobility/ Daily Activity assessment daily.  - Set and communicate daily mobility goal to care team and patient/family/caregiver.   - Collaborate with rehabilitation services on mobility goals if consulted  - Out of bed for toileting  - Record patient progress and toleration of activity level   Outcome: Progressing     Problem: Knowledge Deficit  Goal: Patient/family/caregiver demonstrates understanding of disease process, treatment plan, medications, and discharge instructions  Description: Complete learning assessment and assess knowledge base.  Interventions:  - Provide teaching at level of understanding  - Provide teaching via preferred learning methods  Outcome: Progressing  Goal: Verbalizes understanding of labor plan  Description: Assess  patient/family/caregiver's baseline knowledge level and ability to understand information.  Provide education via patient/family/caregiver's preferred learning method at appropriate level of understanding.     1. Provide teaching at level of understanding.  2. Provide teaching via preferred learning method(s).  Outcome: Progressing     Problem: DISCHARGE PLANNING  Goal: Discharge to home or other facility with appropriate resources  Description: INTERVENTIONS:  - Identify barriers to discharge w/patient and caregiver  - Arrange for needed discharge resources and transportation as appropriate  - Identify discharge learning needs (meds, wound care, etc.)  - Arrange for interpretive services to assist at discharge as needed  - Refer to Case Management Department for coordinating discharge planning if the patient needs post-hospital services based on physician/advanced practitioner order or complex needs related to functional status, cognitive ability, or social support system  Outcome: Progressing     Problem: Labor & Delivery  Goal: Manages discomfort  Description: Assess and monitor for signs and symptoms of discomfort.  Assess patient's pain level regularly and per hospital policy.  Administer medications as ordered. Support use of nonpharmacological methods to help control pain such as distraction, imagery, relaxation, and application of heat and cold.  Collaborate with interdisciplinary team and patient to determine appropriate pain management plan.    1. Include patient in decisions related to comfort.  2. Offer non-pharmacological pain management interventions.  3. Report ineffective pain management to physician.  Outcome: Progressing  Goal: Patient vital signs are stable  Description: 1. Assess vital signs - vaginal delivery.  Outcome: Progressing

## 2024-06-28 NOTE — OB LABOR/OXYTOCIN SAFETY PROGRESS
Labor Progress Note - Rachel Doe 34 y.o. female MRN: [id number]    Unit/Bed#: LD TRIAGE 4-01 Encounter: [id number]                Cervical Dilation: 1        Cervical Effacement: 50  Fetal Station: -3     Fetal Heart Rate (FHT): 150 BPM  FHR Category: 1               Vital Signs:   Vitals:    06/28/24 1034   BP: 142/80   Pulse: 92   Resp:    Temp:        Notes/comments:   SVE as above. PROCEDURE:  MURRAY BALLOON PLACEMENT    A 24F murray with a 30cc balloon was selected, SVE was performed and cervix was located, murray was introduced over sterile gloved hands. Balloon advanced through cervix beyond the internal cervical os. A small amount amount of sterile saline solution was instilled in the balloon to confirm placement. Placement was confirmed to be beyond the internal cervical os. A total of 60cc of sterile saline solution was placed into the balloon. Pt tolerated well. Instructions left with RN to place murray to gravity with a 1L bag of IV fluid. Notify MD when murray dislodged.    Miranda Mendoza MD 6/28/2024 12:09 PM

## 2024-06-28 NOTE — OB LABOR/OXYTOCIN SAFETY PROGRESS
Labor Progress Note - Rachel Doe 34 y.o. female MRN: [id number]    Unit/Bed#: -01 Encounter: [id number]    Dose (mateusz-units/min) Oxytocin: 8 mateusz-units/min  Contraction Frequency (minutes): irregular  Contraction Intensity: Mild/Moderate  Uterine Activity Characteristics: Irregular  Cervical Dilation: 1        Cervical Effacement: 50  Fetal Station: -3  Baseline Rate (FHR): 135 bpm  Fetal Heart Rate (FHT): 150 BPM  FHR Category: cat I               Vital Signs:   Vitals:    06/28/24 1805   BP: 138/75   Pulse: 92   Resp:    Temp:    SpO2:        Notes/comments:   Patient doing well. On the birthing ball. Much more comfortable since balloon came out around 1750. Reviewed plan for continue pitocin titration, likely AROM. Patient does desire an epidural. Will plan for recheck when back to bed or around 2000 to assess if AROM feasible with subsequent check.    Willa Steven MD 6/28/2024 6:28 PM

## 2024-06-28 NOTE — ASSESSMENT & PLAN NOTE
S/p 24 hours Mag   - 6/29: Procardia XL 30 started  - 7/1: increased Procardia to XL 60 mg qD  Consider discharge pending BP control today

## 2024-06-28 NOTE — H&P
H & P- Obstetrics   Rachel Doe 34 y.o. female MRN: [id number]  Unit/Bed#: LD TRIAGE  Encounter: [id number]    Assessment: 34 y.o.  at 36w6d admitted for for induction in the setting of GHTN with sustained severe range blood pressures.    Plan:   * Pre-eclampsia, severe, antepartum, third trimester  Assessment & Plan  Admit for IOL  T&S, CBC, RPR  CLD  IV fluids  GBS prophylaxis is not needed, PCN ordered  Induction with Pitocin titration      36 weeks gestation of pregnancy  Assessment & Plan  Blood Type: A positive. Antibody negative  Pap 2021. Neg/neg   GC/CT -  neg  PN1 Labs- wnl    28 Week Labs- nml, TSH nml  COVID vaccine- x 2   Flu vaccine - completed  TDAP - 24    Delivery consent- signed  Breast pump - ordered  Pediatrician - has  GBS swab - done     IOL -  for sustained severe range pressure GHTN    Hypothyroidism during pregnancy in third trimester  Assessment & Plan  Continue home synthroid        Discussed case and plan w/ Dr. Escobar and Dr. Lee      Chief Complaint: Elevated blood pressure    HPI: Rachel Doe is a 34 y.o.  with an MARIANN of 2024, by Ultrasound at 36w6d who is being admitted for induction of labor in the setting of gestational hypertension with severe range blood pressure. She has a history of GHTN and checked her BP at work this morning and sustained pressures in the 150s. Upon presentation to triage she had sustained severe range pressures in the 170s/110s. She endorses a very mild headache today and shortness of breath the last few weeks of pregnancy that is worse when she wakes up and with activity but is not present at rest. She denies any vision changes, RUQ pain, decreased urine output, CP, or resting SOB. She endorses irregular, weak contractions, has no LOF, and reports no VB. She states she has felt good FM.    Patient Active Problem List   Diagnosis    PCOS (polycystic ovarian syndrome)    Subclinical hypothyroidism    Elevated  blood pressure reading in office without diagnosis of hypertension    History of gestational hypertension    Hypothyroidism during pregnancy in third trimester    Multigravida of advanced maternal age in third trimester    Obesity affecting pregnancy in third trimester    36 weeks gestation of pregnancy    Fetal renal anomaly, single gestation    Intermittent palpitations    Pre-eclampsia, severe, antepartum, third trimester    Encounter for induction of labor       Baby complications/comments: none    Review of Systems   Constitutional:  Negative for chills and fever.   HENT:  Negative for ear pain and sore throat.    Eyes:  Negative for pain and visual disturbance.   Respiratory:  Positive for shortness of breath (only upon waking or with activity). Negative for cough.    Cardiovascular:  Negative for chest pain and palpitations.   Gastrointestinal:  Negative for abdominal pain and vomiting.   Genitourinary:  Negative for dysuria and hematuria.   Musculoskeletal:  Negative for arthralgias and back pain.   Skin:  Negative for color change and rash.   Neurological:  Positive for headaches. Negative for seizures and syncope.       OB Hx:  OB History    Para Term  AB Living   4 1 1 0 2 1   SAB IAB Ectopic Multiple Live Births   2 0 0 0 1      # Outcome Date GA Lbr Thomas/2nd Weight Sex Type Anes PTL Lv   4 Current            3 SAB 22 10w0d          2 Term 22 37w1d / 00:40 2940 g (6 lb 7.7 oz) F Vag-Spont EPI  TISH      Complications: Gestational hypertension   1 SAB 2021 5w0d              Past Medical Hx:  Past Medical History:   Diagnosis Date    Female infertility 2020    Gestational hypertension     Hypothyroidism     dx 2021    PCOS (polycystic ovarian syndrome)        Past Surgical hx:  No past surgical history on file.    Social Hx:  Alcohol use: none  Tobacco use: none  Other substance use: none    Allergies   Allergen Reactions    Co Q10 [Ubidecarenone] Hives         Medications  Prior to Admission:     levothyroxine 50 mcg tablet    Objective:  Temp:  [98.2 °F (36.8 °C)] 98.2 °F (36.8 °C)  HR:  [92-96] 92  Resp:  [18] 18  BP: (142-175)/(80-98) 142/80  There is no height or weight on file to calculate BMI.     Physical Exam:  Physical Exam  Constitutional:       Appearance: Normal appearance.   HENT:      Head: Normocephalic and atraumatic.      Right Ear: External ear normal.      Left Ear: External ear normal.      Nose: Nose normal.      Mouth/Throat:      Mouth: Mucous membranes are moist.      Pharynx: Oropharynx is clear.   Eyes:      Conjunctiva/sclera: Conjunctivae normal.   Cardiovascular:      Rate and Rhythm: Normal rate and regular rhythm.      Pulses: Normal pulses.      Heart sounds: Normal heart sounds.   Pulmonary:      Effort: Pulmonary effort is normal.      Breath sounds: Normal breath sounds.   Abdominal:      Comments: Gravid abdomen. Minimal tenderness over firm uterus   Musculoskeletal:      Cervical back: Normal range of motion.   Neurological:      Mental Status: She is alert.   Skin:     General: Skin is warm and dry.   Vitals and nursing note reviewed. Exam conducted with a chaperone present.            FHT:       TOCO:        Lab Results   Component Value Date    WBC 8.67 06/28/2024    HGB 11.8 06/28/2024    HCT 36.3 06/28/2024     06/28/2024     Lab Results   Component Value Date    K 5.1 06/28/2024     06/28/2024    CO2 19 (L) 06/28/2024    BUN 6 06/28/2024    CREATININE 0.48 (L) 06/28/2024    AST 39 06/28/2024    ALT 9 06/28/2024       Prenatal Labs: Reviewed      Blood type: A+  Antibody: Neg  GBS: Neg  HIV: Non-reactive  Rubella: Immune  Syphilis IgM/IgG: Non-reactive  HBsAg: Non-reactive  HCAb: Non-reactive  Chlamydia: Negative  Gonorrhea: Negative  Diabetes 1 hour screen: wnl  3 hour glucose: wnl  Platelets: 209  Hgb: 11.8    <2 Midnights  OBSERVATION    Signature/Title: Bg Garcia MD  Date: 6/28/2024  Time: 10:38 AM

## 2024-06-28 NOTE — OB LABOR/OXYTOCIN SAFETY PROGRESS
Oxytocin Safety Progress Check Note - Rachel Doe 34 y.o. female MRN: [id number]    Unit/Bed#: -01 Encounter: [id number]    Dose (mateusz-units/min) Oxytocin: 10 mateusz-units/min  Contraction Frequency (minutes): 1-3  Contraction Intensity: Moderate  Uterine Activity Characteristics: Irregular  Cervical Dilation: 3-4        Cervical Effacement: 50  Fetal Station: -3  Baseline Rate (FHR): 135 bpm  Fetal Heart Rate (FHT): 130 BPM  FHR Category: 1               Vital Signs:   Vitals:    06/28/24 1805   BP: 138/75   Pulse: 92   Resp:    Temp: 97.5 °F (36.4 °C)   SpO2: 100%       FB out. SVE as above, pit @10 continue to titrate as tolerated.     Darcy Santiago MD 6/28/2024 7:45 PM

## 2024-06-28 NOTE — ASSESSMENT & PLAN NOTE
Continue routine post partum care  Pain well controlled: tylenol/motrin scheduled  Lochia within normal limits: continue to monitor   OOB: as able, encourage ambulation  Passing flatus  Voiding spontaneously  Breastfeeding  Baby in: NICU  Dispo: anticipate d/c home today pending BP

## 2024-06-28 NOTE — PROGRESS NOTES
L&D Triage Note - OB/GYN  Rachel Doe 34 y.o. female MRN: [id number]  Unit/Bed#:  TRIAGE  Encounter: [id number]      ASSESSMENT:    Rachel Doe is a 34 y.o.  at 36w6d presenting with elevated blood pressure in the setting of GHTN. In triage she had a severe rage BP***Denies Pre-E Symptoms HA, RUQ pain, dizziness, CP, SOB.    PLAN:    1) GHTN  - Pre-E labs collected CMP, CBC, P:C  - ***    2) *** weeks gestation of pregnancy  - Continue routine prenatal care  - Discharge from OB triage with pre***term*** labor precautions    - Reviewed rupture of membranes, false vs true labor, decreased fetal movement, and vaginal bleeding   - Pt to call provider with any concerns and follow up at her next scheduled prenatal appointment    - Case discussed with  ***    SUBJECTIVE:    Rachel Doe 34 y.o.  at 36w6d with an Estimated Date of Delivery: 24 who presents to triage for Elevated BP in the setting of GHTN. ***  She otherwise denies contractions***, leakage of fluid***, vaginal bleeding***, and decreased fetal movement***.    Her current obstetrical history is significant for ***    Her past obstetrical history is significant for ***    OBJECTIVE:    There were no vitals filed for this visit.    ROS:  Constitutional: Negative***  Respiratory: Negative***  Cardiovascular: Negative***    Gastrointestinal: Negative***    General Physical Exam:  General: Well appearing, no distress  Respiratory: Unlabored breathing  Cardiovascular: Regular rate.  Abdomen: Soft, gravid, nontender  Fundal Height: Appropriate for gestational age.  Extremities: Warm and well perfused.  Non tender.      Cervical Exam  Speculum: Cervical os is ***  SVE: ***    FETAL ASSESSMENT:  Fetal heart rate:    [place]:      KOH/WTMT:     Infection:   - *** clue cells    - *** hyphae   - *** trichomonads present    Membrane status   - *** ferning   - *** nitrazene   - *** pooling     Urine Dip    - ***    Imaging:       TVUS   -  Cervical length    - ***cm    - ***cm    - ***cm   - Presentation: vertex***         Abd. US   SONIA      - Q1 *** cm     - Q2 *** cm     - Q3 *** cm     - Q4 *** cm     - Total: *** cm   Placenta: ***   Presentation: ***    Bg Garcia MD  FM-B, PGY-1  06/28/24  8:54 AM

## 2024-06-28 NOTE — TELEPHONE ENCOUNTER
Pt advised by TERRELL to contact us with elevated BP reading, 155/122, feels heart racing. Transferring to triage for eval

## 2024-06-29 LAB
BASE EXCESS BLDCOA CALC-SCNC: -12 MMOL/L (ref 3–11)
BASE EXCESS BLDCOV CALC-SCNC: -9.6 MMOL/L (ref 1–9)
HCO3 BLDCOA-SCNC: 18.6 MMOL/L (ref 17.3–27.3)
HCO3 BLDCOV-SCNC: 17.6 MMOL/L (ref 12.2–28.6)
O2 CT VFR BLDCOA CALC: 6 ML/DL
OXYHGB MFR BLDCOA: 27.6 %
OXYHGB MFR BLDCOV: 42.1 %
PCO2 BLDCOA: 61.8 MM[HG] (ref 30–60)
PCO2 BLDCOV: 42.7 MM HG (ref 27–43)
PH BLDCOA: 7.1 [PH] (ref 7.23–7.43)
PH BLDCOV: 7.23 [PH] (ref 7.19–7.49)
PO2 BLDCOA: 17.8 MM HG (ref 5–25)
PO2 BLDCOV: 21.8 MM HG (ref 15–45)
SAO2 % BLDCOV: 8.8 ML/DL

## 2024-06-29 PROCEDURE — NC001 PR NO CHARGE: Performed by: OBSTETRICS & GYNECOLOGY

## 2024-06-29 PROCEDURE — 88307 TISSUE EXAM BY PATHOLOGIST: CPT | Performed by: STUDENT IN AN ORGANIZED HEALTH CARE EDUCATION/TRAINING PROGRAM

## 2024-06-29 PROCEDURE — 82805 BLOOD GASES W/O2 SATURATION: CPT | Performed by: STUDENT IN AN ORGANIZED HEALTH CARE EDUCATION/TRAINING PROGRAM

## 2024-06-29 PROCEDURE — 59400 OBSTETRICAL CARE: CPT | Performed by: STUDENT IN AN ORGANIZED HEALTH CARE EDUCATION/TRAINING PROGRAM

## 2024-06-29 PROCEDURE — 0HQ9XZZ REPAIR PERINEUM SKIN, EXTERNAL APPROACH: ICD-10-PCS | Performed by: STUDENT IN AN ORGANIZED HEALTH CARE EDUCATION/TRAINING PROGRAM

## 2024-06-29 RX ORDER — DOCUSATE SODIUM 100 MG/1
100 CAPSULE, LIQUID FILLED ORAL 2 TIMES DAILY
Status: DISCONTINUED | OUTPATIENT
Start: 2024-06-29 | End: 2024-07-02 | Stop reason: HOSPADM

## 2024-06-29 RX ORDER — ONDANSETRON 2 MG/ML
4 INJECTION INTRAMUSCULAR; INTRAVENOUS EVERY 8 HOURS PRN
Status: DISCONTINUED | OUTPATIENT
Start: 2024-06-29 | End: 2024-07-02 | Stop reason: HOSPADM

## 2024-06-29 RX ORDER — BENZOCAINE/MENTHOL 6 MG-10 MG
1 LOZENGE MUCOUS MEMBRANE DAILY PRN
Status: DISCONTINUED | OUTPATIENT
Start: 2024-06-29 | End: 2024-07-02 | Stop reason: HOSPADM

## 2024-06-29 RX ORDER — SODIUM CHLORIDE, SODIUM LACTATE, POTASSIUM CHLORIDE, CALCIUM CHLORIDE 600; 310; 30; 20 MG/100ML; MG/100ML; MG/100ML; MG/100ML
50 INJECTION, SOLUTION INTRAVENOUS CONTINUOUS
Status: DISCONTINUED | OUTPATIENT
Start: 2024-06-29 | End: 2024-07-02 | Stop reason: HOSPADM

## 2024-06-29 RX ORDER — NIFEDIPINE 30 MG/1
30 TABLET, EXTENDED RELEASE ORAL DAILY
Status: DISCONTINUED | OUTPATIENT
Start: 2024-06-29 | End: 2024-07-01

## 2024-06-29 RX ORDER — DIPHENHYDRAMINE HCL 25 MG
25 TABLET ORAL EVERY 6 HOURS PRN
Status: DISCONTINUED | OUTPATIENT
Start: 2024-06-29 | End: 2024-07-02 | Stop reason: HOSPADM

## 2024-06-29 RX ORDER — SIMETHICONE 80 MG
80 TABLET,CHEWABLE ORAL 4 TIMES DAILY PRN
Status: DISCONTINUED | OUTPATIENT
Start: 2024-06-29 | End: 2024-07-02 | Stop reason: HOSPADM

## 2024-06-29 RX ORDER — SENNOSIDES 8.6 MG
1 TABLET ORAL DAILY
Status: DISCONTINUED | OUTPATIENT
Start: 2024-06-29 | End: 2024-07-02 | Stop reason: HOSPADM

## 2024-06-29 RX ORDER — MAGNESIUM HYDROXIDE/ALUMINUM HYDROXICE/SIMETHICONE 120; 1200; 1200 MG/30ML; MG/30ML; MG/30ML
15 SUSPENSION ORAL EVERY 6 HOURS PRN
Status: DISCONTINUED | OUTPATIENT
Start: 2024-06-29 | End: 2024-07-02 | Stop reason: HOSPADM

## 2024-06-29 RX ORDER — IBUPROFEN 600 MG/1
600 TABLET ORAL EVERY 6 HOURS SCHEDULED
Status: DISCONTINUED | OUTPATIENT
Start: 2024-06-29 | End: 2024-07-02 | Stop reason: HOSPADM

## 2024-06-29 RX ORDER — MAGNESIUM SULFATE HEPTAHYDRATE 40 MG/ML
2 INJECTION, SOLUTION INTRAVENOUS CONTINUOUS
Status: DISPENSED | OUTPATIENT
Start: 2024-06-29 | End: 2024-06-30

## 2024-06-29 RX ORDER — ACETAMINOPHEN 325 MG/1
650 TABLET ORAL EVERY 6 HOURS SCHEDULED
Status: DISCONTINUED | OUTPATIENT
Start: 2024-06-29 | End: 2024-07-02 | Stop reason: HOSPADM

## 2024-06-29 RX ORDER — CALCIUM CARBONATE 500 MG/1
1000 TABLET, CHEWABLE ORAL 3 TIMES DAILY PRN
Status: DISCONTINUED | OUTPATIENT
Start: 2024-06-29 | End: 2024-07-02 | Stop reason: HOSPADM

## 2024-06-29 RX ORDER — CALCIUM GLUCONATE 94 MG/ML
1 INJECTION, SOLUTION INTRAVENOUS ONCE AS NEEDED
Status: DISCONTINUED | OUTPATIENT
Start: 2024-06-29 | End: 2024-07-01

## 2024-06-29 RX ORDER — MAGNESIUM SULFATE HEPTAHYDRATE 40 MG/ML
2 INJECTION, SOLUTION INTRAVENOUS CONTINUOUS
Status: DISCONTINUED | OUTPATIENT
Start: 2024-06-29 | End: 2024-06-29

## 2024-06-29 RX ORDER — OXYTOCIN/RINGER'S LACTATE 30/500 ML
250 PLASTIC BAG, INJECTION (ML) INTRAVENOUS CONTINUOUS
Status: ACTIVE | OUTPATIENT
Start: 2024-06-29 | End: 2024-06-29

## 2024-06-29 RX ADMIN — DOCUSATE SODIUM 100 MG: 100 CAPSULE, LIQUID FILLED ORAL at 20:15

## 2024-06-29 RX ADMIN — MAGNESIUM SULFATE HEPTAHYDRATE 2 G/HR: 40 INJECTION, SOLUTION INTRAVENOUS at 09:05

## 2024-06-29 RX ADMIN — IBUPROFEN 600 MG: 600 TABLET, FILM COATED ORAL at 09:01

## 2024-06-29 RX ADMIN — NIFEDIPINE 30 MG: 30 TABLET, EXTENDED RELEASE ORAL at 15:03

## 2024-06-29 RX ADMIN — ACETAMINOPHEN 650 MG: 325 TABLET, FILM COATED ORAL at 23:51

## 2024-06-29 RX ADMIN — LEVOTHYROXINE SODIUM 50 MCG: 50 TABLET ORAL at 05:26

## 2024-06-29 RX ADMIN — MAGNESIUM SULFATE HEPTAHYDRATE 2 G/HR: 40 INJECTION, SOLUTION INTRAVENOUS at 18:07

## 2024-06-29 RX ADMIN — SODIUM CHLORIDE, SODIUM LACTATE, POTASSIUM CHLORIDE, AND CALCIUM CHLORIDE 50 ML/HR: .6; .31; .03; .02 INJECTION, SOLUTION INTRAVENOUS at 09:05

## 2024-06-29 RX ADMIN — IBUPROFEN 600 MG: 600 TABLET, FILM COATED ORAL at 20:15

## 2024-06-29 NOTE — PLAN OF CARE
Problem: PAIN - ADULT  Goal: Verbalizes/displays adequate comfort level or baseline comfort level  Description: Interventions:  - Encourage patient to monitor pain and request assistance  - Assess pain using appropriate pain scale  - Administer analgesics based on type and severity of pain and evaluate response  - Implement non-pharmacological measures as appropriate and evaluate response  - Consider cultural and social influences on pain and pain management  - Notify physician/advanced practitioner if interventions unsuccessful or patient reports new pain  Outcome: Progressing     Problem: INFECTION - ADULT  Goal: Absence or prevention of progression during hospitalization  Description: INTERVENTIONS:  - Assess and monitor for signs and symptoms of infection  - Monitor lab/diagnostic results  - Monitor all insertion sites, i.e. indwelling lines, tubes, and drains  - Monitor endotracheal if appropriate and nasal secretions for changes in amount and color  - [place] appropriate cooling/warming therapies per order  - Administer medications as ordered  - Instruct and encourage patient and family to use good hand hygiene technique  - Identify and instruct in appropriate isolation precautions for identified infection/condition  Outcome: Progressing  Goal: Absence of fever/infection during neutropenic period  Description: INTERVENTIONS:  - Monitor WBC    Outcome: Progressing     Problem: SAFETY ADULT  Goal: Patient will remain free of falls  Description: INTERVENTIONS:  - Educate patient/family on patient safety including physical limitations  - Instruct patient to call for assistance with activity   - Consult OT/PT to assist with strengthening/mobility   - Keep Call bell within reach  - Keep bed low and locked with side rails adjusted as appropriate  - Keep care items and personal belongings within reach  - Initiate and maintain comfort rounds  - Make Fall Risk Sign visible to staff  - Offer Toileting every 2-3  Hours, in advance of need  - Initiate/Maintain alarm  - Obtain necessary fall risk management equipment:   - Apply yellow socks and bracelet for high fall risk patients  - Consider moving patient to room near nurses station  Outcome: Progressing  Goal: Maintain or return to baseline ADL function  Description: INTERVENTIONS:  -  Assess patient's ability to carry out ADLs; assess patient's baseline for ADL function and identify physical deficits which impact ability to perform ADLs (bathing, care of mouth/teeth, toileting, grooming, dressing, etc.)  - Assess/evaluate cause of self-care deficits   - Assess range of motion  - Assess patient's mobility; develop plan if impaired  - Assess patient's need for assistive devices and provide as appropriate  - Encourage maximum independence but intervene and supervise when necessary  - Involve family in performance of ADLs  - Assess for home care needs following discharge   - Consider OT consult to assist with ADL evaluation and planning for discharge  - Provide patient education as appropriate  Outcome: Progressing  Goal: Maintains/Returns to pre admission functional level  Description: INTERVENTIONS:  - Perform AM-PAC 6 Click Basic Mobility/ Daily Activity assessment daily.  - Set and communicate daily mobility goal to care team and patient/family/caregiver.   - Collaborate with rehabilitation services on mobility goals if consulted  - Out of bed for toileting  - Record patient progress and toleration of activity level   Outcome: Progressing     Problem: DISCHARGE PLANNING  Goal: Discharge to home or other facility with appropriate resources  Description: INTERVENTIONS:  - Identify barriers to discharge w/patient and caregiver  - Arrange for needed discharge resources and transportation as appropriate  - Identify discharge learning needs (meds, wound care, etc.)  - Arrange for interpretive services to assist at discharge as needed  - Refer to Case Management Department for  coordinating discharge planning if the patient needs post-hospital services based on physician/advanced practitioner order or complex needs related to functional status, cognitive ability, or social support system  Outcome: Progressing     Problem: POSTPARTUM  Goal: Experiences normal postpartum course  Description: INTERVENTIONS:  - Monitor maternal vital signs  - Assess uterine involution and lochia  Outcome: Progressing  Goal: Appropriate maternal -  bonding  Description: INTERVENTIONS:  - Identify family support  - Assess for appropriate maternal/infant bonding   -Encourage maternal/infant bonding opportunities  - Referral to  or  as needed  Outcome: Progressing  Goal: Establishment of infant feeding pattern  Description: INTERVENTIONS:  - Assess breast/bottle feeding  - Refer to lactation as needed  Outcome: Progressing

## 2024-06-29 NOTE — OB LABOR/OXYTOCIN SAFETY PROGRESS
Oxytocin Safety Progress Check Note - Rachel Doe 34 y.o. female MRN: [id number]    Unit/Bed#: -01 Encounter: [id number]    Dose (mateusz-units/min) Oxytocin: 10 mateusz-units/min  Contraction Frequency (minutes): 1.5-4.5  Contraction Intensity: Moderate/Strong  Uterine Activity Characteristics: Regular  Cervical Dilation: 4-5        Cervical Effacement: 70  Fetal Station: -2  Baseline Rate (FHR): 155 bpm  Fetal Heart Rate (FHT): 140 BPM  FHR Category: I               Vital Signs:   Vitals:    06/28/24 2201   BP:    Pulse: (!) 118   Resp:    Temp:    SpO2:        Notes/comments:   Comfortable with epidural. [place] catheter sterile insertion. AROM for clear fluid. 4-5/70/-2.    Willa Steven MD 6/28/2024 11:29 PM

## 2024-06-29 NOTE — L&D DELIVERY NOTE
Vaginal Delivery Summary - OB/GYN   Rachel Doe 34 y.o. female MRN: [id number]  Unit/Bed#: -01 Encounter: [id number]      Pre-delivery Diagnosis:   1. Pregnancy at 37+0   2. Pre-eclampsia with severe features  3. Hypothyroidism  4. Obesity Body mass index is 45.71 kg/m².  5. Left urinary tract dilation    Post-delivery Diagnosis: same, delivered    Procedure: Spontaneous Vaginal Delivery     Attending: Willa Steven MD    Assistant(s): Darcy Santiago MD    Anesthesia: Epidural    QBL: 363cc    Complications: none apparent    Specimens:   1. Arterial and venous cord gases  2. Cord blood  3. Segment of umbilical cord  4. Placenta to pathology     Findings:  1. Viable female on 2024 at 0218, with APGARS of 7 and 9 at 1 and 5 minutes respectively,  2. Spontaneous delivery of intact placenta at 0229  3. 1 degree laceration repaired with 3-0 vicryl rapid  4. Blood gases:   Arterial pH: 7.092   Arterial base excess: -12.0   Venous pH: 7.232   Venous base excess: -9.6    Disposition:  Patient tolerated the procedure well and was recovering in labor and delivery room       Brief history and labor course:  Ms. Rachel Doe is a 34 y.o.  at 37w0d. She presented to labor and delivery for elevated blood pressures and found to have pre-eclampsia with severe features. Her pregnancy was complicated by hypothyroidism in pregnancy, obesity and urinary tract dilation. On exam in triage she was noted to be 1 cm. She was admitted for induction of labor. Her induction was started with murray and pitocin. She received an epidural and AROM. She progressed to complete.    Description of procedure:  After pushing for 15 minutes, at 0218 patient delivered a viable female , wt pending, apgars of 7 (1 min) and 9 (5 min). The fetal vertex delivered spontaneously. There was no nuchal cord. The right anterior shoulder delivered atraumatically with maternal expulsive forces and the assistance of gentle  downward traction. The left posterior shoulder delivered with maternal expulsive forces and the assistance of gentle upward traction. The remainder of the fetus delivered spontaneously.     Upon delivery, the infant was placed on the mothers abdomen and the cord was clamped and cut. The infant was noted to cry spontaneously and was moving all extremities appropriately. There was no evidence for injury. Awaiting nurse resuscitators evaluated the . Arterial and venous cord blood gases and cord blood was collected for analysis. These were promptly sent to the lab. In the immediate post-partum, 30 units of IV pitocin was administered, and the uterus was noted to contract down well with massage and pitocin. The placenta delivered spontaneously at 0229 and was noted to have a eccentrically inserted 3 vessel cord.     The vagina, cervix, perineum, and rectum were inspected and there was noted to be a first degree laceration.    Laceration Repair  Patient was comfortable with epidural at that time. A first degree was identified and required repair. Laceration was repaired with 3-0 Vicryl rapid in a running locked fashion to reapproximate the laceration. Good hemostasis was confirmed at the conclusion of this procedure.    At the conclusion of the procedure, all needle, sponge, and instrument counts were noted to be correct. Patient tolerated the procedure well and was allowed to recover in labor and delivery room with family and  before being transferred to the post-partum floor. I was present and participated in all key portions of the case.      Willa Steven MD  2024  3:01 AM

## 2024-06-29 NOTE — ANESTHESIA PROCEDURE NOTES
Epidural Block    Patient location during procedure: OB/L&D  Start time: 6/28/2024 9:31 PM  Reason for block: procedure for pain  Staffing  Performed by: Bg Martin MD  Authorized by: Bg Martin MD    Preanesthetic Checklist  Completed: patient identified, IV checked, site marked, risks and benefits discussed, surgical consent, monitors and equipment checked, pre-op evaluation and timeout performed  Epidural  Patient position: sitting  Prep: ChloraPrep  Sedation Level: no sedation  Patient monitoring: frequent blood pressure checks, continuous pulse oximetry and heart rate  Approach: midline  Location: lumbar, L3-4  Injection technique: RIRI saline  Needle  Needle type: Tuohy   Needle gauge: 18 G  Needle insertion depth: 6.5 cm  Catheter type: multi-orifice  Catheter size: 20 G  Catheter at skin depth: 12 cm  Catheter securement method: stabilization device and clear occlusive dressing  Test dose: negative  Assessment  Sensory level: T10  Number of attempts: 1negative aspiration for CSF, negative aspiration for heme and no paresthesia on injection  patient tolerated the procedure well with no immediate complications

## 2024-06-29 NOTE — OB LABOR/OXYTOCIN SAFETY PROGRESS
Oxytocin Safety Progress Check Note - Rachel Doe 34 y.o. female MRN: [id number]    Unit/Bed#: -01 Encounter: [id number]    Dose (mateusz-units/min) Oxytocin: 12 mateusz-units/min  Contraction Frequency (minutes): 2-6.5  Contraction Intensity: Moderate/Strong  Uterine Activity Characteristics: Irregular  Cervical Dilation: 10  Dilation Complete Date: 06/29/24  Dilation Complete Time: 0155  Cervical Effacement: 100  Fetal Station: 0  Baseline Rate (FHR): 150 bpm  Fetal Heart Rate (FHT): 140 BPM  FHR Category: 1               Vital Signs:   Vitals:    06/29/24 0100   BP:    Pulse: (!) 120   Resp:    Temp:    SpO2: 96%       Notes/comments:   Patient now completely dilated and comfortable, FHT cat 1, will start pushing shortly      Marian Vazquez MD 6/29/2024 1:55 AM

## 2024-06-29 NOTE — DISCHARGE SUMMARY
Discharge Summary - OB/GYN   Rachel Doe 34 y.o. female MRN: [id number]  Unit/Bed#: -01 Encounter: [id number]      Admission Date: 2024     Discharge Date: 2024    Admitting Diagnosis:   Patient Active Problem List   Diagnosis    PCOS (polycystic ovarian syndrome)    Subclinical hypothyroidism    History of gestational hypertension    Hypothyroidism during pregnancy in third trimester    Multigravida of advanced maternal age in third trimester    Obesity affecting pregnancy in third trimester     (spontaneous vaginal delivery)    Fetal renal anomaly, single gestation    Intermittent palpitations    Pre-eclampsia, severe, antepartum, third trimester    Encounter for induction of labor        Discharge Diagnosis:   Same, delivered    Procedures: spontaneous vaginal delivery    Delivery Attending: Willa Mendoza Shaggy*  Discharge Attending: Aria Hernandez DO     [facility] Course:     Rachel Doe is a 34 y.o. now  at 37w0d wks who was initially admitted for IOL in setting of preeclampsia with severe features. She required acute treatment with IV labetalol 20 mg and Magnesium was started. IOL was started with FB and pitocn. Amniotomy was performed for clear fluid. She progressed to complete and began pushing.      She delivered a viable female  on 24 at 0218. Weight 6lbs 14oz via spontaneous vaginal delivery. Apgars were 7 (1 min) and 9 (5 min).  was transferred to NICU. Patient tolerated the procedure well and was transferred to recovery in stable condition.     Her postpartum course was complicated by needing procardia and a 5 day course of lasix to control her blood pressures postpartum. Her postpartum pain was well controlled with oral analgesics.    On day of discharge, She was normotensive, she was ambulating and able to reasonably perform all ADLs. She was voiding and had appropriate bowel function. Pain was well controlled. She was discharged home on  post-partum day #3 without complications. Patient was instructed to follow up with her OB as an outpatient and was given appropriate warnings to call provider if she develops signs of infection or uncontrolled pain.    Complications: none apparent    Condition at discharge: good     Discharge instructions/Information to patient and family:   - Do not place anything (no partner, tampons or douche) in your vagina for 6 weeks.  -You may walk for exercise for the first 6 weeks then gradually return to your usual activities.   -Please do not drive for 1 week if you have no stitches and for 2 weeks if you have stitches or underwent a  delivery.    -You may take baths or shower per your preference.   -Please look at your bust (breasts) in the mirror daily and call for redness or tenderness or increased warmth.   -Please call us for temperature > 100.4*F or 38* C, worsening pain or a foul discharge.      Discharge Medications:   Prenatal vitamin daily for 6 months or the duration of nursing whichever is longer.  Motrin 600 mg orally every 6 hours as needed for pain  Tylenol (over the counter) per bottle directions as needed for pain: do NOT use with percocet  Hydrocortisone cream 1% (over the counter) applied 1-2x daily to hemorrhoids as needed    Planned Readmission: No      Aria Hernandez DO      58

## 2024-06-29 NOTE — LACTATION NOTE
"Attempted to meet with mother. Family present, mother is visiting baby in the NICU.    Ready, Set, Baby/Breastfeeding Discharge booklets were left at the bedside, along with a pumping log and handout on \"Increasing the Milk Supply for Baby in the NICU\".    Double breast pump is at the bedside set up.    Contact left on board to communicate with lactation for questions/concerns.  "

## 2024-06-29 NOTE — ANESTHESIA PREPROCEDURE EVALUATION
Procedure:  LABOR ANALGESIA    Relevant Problems   ANESTHESIA (within normal limits)      CARDIO   (+) Pre-eclampsia, severe, antepartum, third trimester   (-) Angina at rest   (-) Angina of effort   (-) Chest pain   (-) GARCIAS (dyspnea on exertion)      ENDO   (+) Hypothyroidism during pregnancy in third trimester   (+) Subclinical hypothyroidism   (-) Diabetes mellitus type 1 (HCC)   (-) Diabetes mellitus, type 2 (HCC)      GI/HEPATIC   (-) Chronic liver disease      /RENAL   (+) Fetal renal anomaly, single gestation   (-) Chronic kidney disease      GYN   (+) 36 weeks gestation of pregnancy   (+) Multigravida of advanced maternal age in third trimester      NEURO/PSYCH   (-) CVA (cerebral vascular accident) (HCC)   (-) Seizures (HCC)      PULMONARY   (-) Asthma   (-) Chronic obstructive pulmonary disease (HCC)   (-) Sleep apnea        Physical Exam    Airway    Mallampati score: I  TM Distance: >3 FB  Neck ROM: full     Dental   No notable dental hx     Cardiovascular      Pulmonary      Other Findings  post-pubertal.      Anesthesia Plan  ASA Score- 2     Anesthesia Type- epidural with ASA Monitors.         Additional Monitors:     Airway Plan:            Plan Factors-Exercise tolerance (METS): >4 METS.    Chart reviewed.   Existing labs reviewed.                   Induction-     Postoperative Plan-     Perioperative Resuscitation Plan - Level 1 - Full Code.       Informed Consent- Anesthetic plan and risks discussed with patient.  I personally reviewed this patient with the CRNA. Discussed and agreed on the Anesthesia Plan with the CRNA..

## 2024-06-30 PROCEDURE — 99024 POSTOP FOLLOW-UP VISIT: CPT | Performed by: OBSTETRICS & GYNECOLOGY

## 2024-06-30 RX ADMIN — DOCUSATE SODIUM 100 MG: 100 CAPSULE, LIQUID FILLED ORAL at 09:44

## 2024-06-30 RX ADMIN — IBUPROFEN 600 MG: 600 TABLET, FILM COATED ORAL at 23:32

## 2024-06-30 RX ADMIN — BENZOCAINE AND LEVOMENTHOL 1 APPLICATION: 200; 5 SPRAY TOPICAL at 09:52

## 2024-06-30 RX ADMIN — ACETAMINOPHEN 650 MG: 325 TABLET, FILM COATED ORAL at 23:32

## 2024-06-30 RX ADMIN — ACETAMINOPHEN 650 MG: 325 TABLET, FILM COATED ORAL at 18:30

## 2024-06-30 RX ADMIN — NIFEDIPINE 30 MG: 30 TABLET, EXTENDED RELEASE ORAL at 09:44

## 2024-06-30 RX ADMIN — IBUPROFEN 600 MG: 600 TABLET, FILM COATED ORAL at 09:43

## 2024-06-30 RX ADMIN — IBUPROFEN 600 MG: 600 TABLET, FILM COATED ORAL at 16:50

## 2024-06-30 RX ADMIN — SENNOSIDES 8.6 MG: 8.6 TABLET, FILM COATED ORAL at 09:44

## 2024-06-30 RX ADMIN — HYDROCORTISONE 1 APPLICATION: 1 CREAM TOPICAL at 09:52

## 2024-06-30 RX ADMIN — IBUPROFEN 600 MG: 600 TABLET, FILM COATED ORAL at 02:20

## 2024-06-30 RX ADMIN — DOCUSATE SODIUM 100 MG: 100 CAPSULE, LIQUID FILLED ORAL at 18:30

## 2024-06-30 NOTE — PLAN OF CARE
Problem: PAIN - ADULT  Goal: Verbalizes/displays adequate comfort level or baseline comfort level  Description: Interventions:  - Encourage patient to monitor pain and request assistance  - Assess pain using appropriate pain scale  - Administer analgesics based on type and severity of pain and evaluate response  - Implement non-pharmacological measures as appropriate and evaluate response  - Consider cultural and social influences on pain and pain management  - Notify physician/advanced practitioner if interventions unsuccessful or patient reports new pain  Outcome: Progressing     Problem: INFECTION - ADULT  Goal: Absence or prevention of progression during hospitalization  Description: INTERVENTIONS:  - Assess and monitor for signs and symptoms of infection  - Monitor lab/diagnostic results  - Monitor all insertion sites, i.e. indwelling lines, tubes, and drains  - Monitor endotracheal if appropriate and nasal secretions for changes in amount and color  - [place] appropriate cooling/warming therapies per order  - Administer medications as ordered  - Instruct and encourage patient and family to use good hand hygiene technique  - Identify and instruct in appropriate isolation precautions for identified infection/condition  Outcome: Progressing  Goal: Absence of fever/infection during neutropenic period  Description: INTERVENTIONS:  - Monitor WBC    Outcome: Progressing     Problem: SAFETY ADULT  Goal: Patient will remain free of falls  Description: INTERVENTIONS:  - Educate patient/family on patient safety including physical limitations  - Instruct patient to call for assistance with activity   - Consult OT/PT to assist with strengthening/mobility   - Keep Call bell within reach  - Keep bed low and locked with side rails adjusted as appropriate  - Keep care items and personal belongings within reach  - Initiate and maintain comfort rounds  - Make Fall Risk Sign visible to staff  - Offer Toileting every 2-3  Hours, in advance of need  - Initiate/Maintain alarm  - Obtain necessary fall risk management equipment:   - Apply yellow socks and bracelet for high fall risk patients  - Consider moving patient to room near nurses station  Outcome: Progressing  Goal: Maintain or return to baseline ADL function  Description: INTERVENTIONS:  -  Assess patient's ability to carry out ADLs; assess patient's baseline for ADL function and identify physical deficits which impact ability to perform ADLs (bathing, care of mouth/teeth, toileting, grooming, dressing, etc.)  - Assess/evaluate cause of self-care deficits   - Assess range of motion  - Assess patient's mobility; develop plan if impaired  - Assess patient's need for assistive devices and provide as appropriate  - Encourage maximum independence but intervene and supervise when necessary  - Involve family in performance of ADLs  - Assess for home care needs following discharge   - Consider OT consult to assist with ADL evaluation and planning for discharge  - Provide patient education as appropriate  Outcome: Progressing  Goal: Maintains/Returns to pre admission functional level  Description: INTERVENTIONS:  - Perform AM-PAC 6 Click Basic Mobility/ Daily Activity assessment daily.  - Set and communicate daily mobility goal to care team and patient/family/caregiver.   - Collaborate with rehabilitation services on mobility goals if consulted  - Out of bed for toileting  - Record patient progress and toleration of activity level   Outcome: Progressing     Problem: DISCHARGE PLANNING  Goal: Discharge to home or other facility with appropriate resources  Description: INTERVENTIONS:  - Identify barriers to discharge w/patient and caregiver  - Arrange for needed discharge resources and transportation as appropriate  - Identify discharge learning needs (meds, wound care, etc.)  - Arrange for interpretive services to assist at discharge as needed  - Refer to Case Management Department for  coordinating discharge planning if the patient needs post-hospital services based on physician/advanced practitioner order or complex needs related to functional status, cognitive ability, or social support system  Outcome: Progressing     Problem: POSTPARTUM  Goal: Experiences normal postpartum course  Description: INTERVENTIONS:  - Monitor maternal vital signs  - Assess uterine involution and lochia  Outcome: Progressing  Goal: Appropriate maternal -  bonding  Description: INTERVENTIONS:  - Identify family support  - Assess for appropriate maternal/infant bonding   -Encourage maternal/infant bonding opportunities  - Referral to  or  as needed  Outcome: Progressing  Goal: Establishment of infant feeding pattern  Description: INTERVENTIONS:  - Assess breast/bottle feeding  - Refer to lactation as needed  Outcome: Progressing

## 2024-06-30 NOTE — PROGRESS NOTES
Obstetrics Progress Note  Rachel Doe 34 y.o. female MRN: [id number]  Unit/Bed#: -01 Encounter: [id number]    Assessment/Plan:    Postpartum Day #1 s/p , currently stable. Baby in room. By issue:  Pre-eclampsia, severe, antepartum, third trimester  Assessment & Plan  S/p 24 hours Mag   - : Procardia XL 30 started  - : normotensive so far, as below:  Systolic (12hrs), Av , Min:119 , Max:137   Diastolic (12hrs), Av, Min:55, Max:72          Obesity affecting pregnancy in third trimester  Assessment & Plan  Pre-gravid BMI 39  Current BMI 45    Hypothyroidism during pregnancy in third trimester  Assessment & Plan  Continue home synthroid    *  (spontaneous vaginal delivery)  Assessment & Plan       Continue routine post partum care  Pain well controlled: tylenol/motrin scheduled  Lochia within normal limits: continue to monitor   OOB: as able, encourage ambulation  Passing flatus  Voiding spontaneously  Breastfeeding  Baby in: NICU  Dispo: anticipate d/c home PPD1-2 pending BP management            Subjective/Objective     Subjective:   No acute events overnight. Pain: controlled. Tolerating PO: yes. Voiding: yes. Flatus: passing. Ambulating: yes. Chest pain: no. Shortness of breath: no. Leg pain: no. Lochia: within normal limits. Baby in NICU.    Objective:   Vitals:   Temp:  [97.7 °F (36.5 °C)-98.8 °F (37.1 °C)] 98.6 °F (37 °C)  HR:  [] 91  Resp:  [16-18] 18  BP: (119-150)/(55-86) 121/55       Intake/Output Summary (Last 24 hours) at 2024 0940  Last data filed at 2024 0000  Gross per 24 hour   Intake 1000 ml   Output 1550 ml   Net -550 ml       Lab Results   Component Value Date    WBC 8.67 2024    HGB 11.8 2024    HCT 36.3 2024    MCV 90 2024     2024       Physical Exam:   General: alert and oriented x3, in no apparent distress  Cardiovascular: regular rate  Pulmonary: normal effort  Abdomen: Soft, non-tender, non-distended, no  rebound or guarding. Uterine fundus firm and non-tender, at the umbilicus  Extremities: Non tender with moderate edema      Darcy Santiago MD  PGY-I, OBGYN  6/30/2024, 9:40 AM

## 2024-06-30 NOTE — LACTATION NOTE
This note was copied from a baby's chart.     06/30/24 1518   Lactation Consultation   Reason for Consult 20 min;20 m;10 minute   Risk Factors NICU infant   Maternal Information   Has mother  before? Yes   How long did the the mother previously breastfeed? 2 months   Previous Maternal Breastfeeding Challenges Low milk supply   LATCH Documentation   Latch 2   Audible Swallowing 1   Type of Nipple 2   Comfort (Breast/Nipple) 2   Hold (Positioning) 1   LATCH Score 8   Having latch problems? No   Position(s) Used Cross Cradle   Breasts/Nipples   Left Breast Soft   Right Breast Soft   Left Nipple Everted   Right Nipple Everted   Intervention Breast pump;Hand expression   Breastfeeding Status Yes   Breastfeeding Progress Not yet established   Other OB Lactation Tools   Feeding Devices Bottle;Pump   Breast Pump   Pump 3  (synergy gold)   Patient Follow-Up   Lactation Consult Status 2   Follow-Up Type Inpatient;Call as needed   Other OB Lactation Documentation    Additional Problem Noted Assisted mom in latching baby to breast. Demonstration with teach back of cross cradle on left breast. Baby latched deeply. Transitioned to right breast in football hold. Demonstration with teach back for deep latch. baby actively sucking with swallows. Mom paced bottle fed 5 mLs of DBM.     Feeding Plan:   1. Meet early feeding cues   2. Bring baby to breast skin to skin   3. Extend chin, anchoring it onto the breast to assist with deeper latch   4. Align nipple to nose, not sliding it to the lower lip, but instead, pivoting the compressed areola over the lower lip if using parent led latching so as to have the nipple come to rest in the arch of the baby's mouth   5. May use breast compressions to stimulate suck and provide more breast milk for enticement.   6. Introduce breast first for feeding, unless baby is not latching. May use hand expression to entice baby to wake   7. Feed infant expressed breast milk after breast feeding    8. Then, feed baby formula/donor breast milk per your preference   9.  Pump after as many feedings at the breast as reasonable   10. Follow up with outpatient lactation as soon as possible     Provided demonstration, education and support of deep latch to breast by placing the nipple to the nose, dragging down to chin to achieve a wide latch. Bring baby to the breast, not breast to baby. Move your shoulders down and away from your ears. Look for ear, shoulder, hip alignment. Baby's upper and lower lip should be flanged on the breast.    Encouraged parents to call for assistance, questions, and concerns about breastfeeding.  Extension provided.

## 2024-07-01 PROCEDURE — 99024 POSTOP FOLLOW-UP VISIT: CPT | Performed by: STUDENT IN AN ORGANIZED HEALTH CARE EDUCATION/TRAINING PROGRAM

## 2024-07-01 RX ORDER — NIFEDIPINE 30 MG/1
30 TABLET, EXTENDED RELEASE ORAL DAILY
Qty: 30 TABLET | Refills: 0 | Status: CANCELLED | OUTPATIENT
Start: 2024-07-01

## 2024-07-01 RX ORDER — NIFEDIPINE 30 MG/1
30 TABLET, EXTENDED RELEASE ORAL ONCE
Status: COMPLETED | OUTPATIENT
Start: 2024-07-01 | End: 2024-07-01

## 2024-07-01 RX ORDER — FUROSEMIDE 20 MG/1
20 TABLET ORAL ONCE
Status: COMPLETED | OUTPATIENT
Start: 2024-07-01 | End: 2024-07-01

## 2024-07-01 RX ORDER — NIFEDIPINE 30 MG/1
60 TABLET, EXTENDED RELEASE ORAL DAILY
Status: DISCONTINUED | OUTPATIENT
Start: 2024-07-02 | End: 2024-07-02 | Stop reason: HOSPADM

## 2024-07-01 RX ADMIN — DOCUSATE SODIUM 100 MG: 100 CAPSULE, LIQUID FILLED ORAL at 10:31

## 2024-07-01 RX ADMIN — ACETAMINOPHEN 650 MG: 325 TABLET, FILM COATED ORAL at 14:55

## 2024-07-01 RX ADMIN — IBUPROFEN 600 MG: 600 TABLET, FILM COATED ORAL at 10:31

## 2024-07-01 RX ADMIN — FUROSEMIDE 20 MG: 20 TABLET ORAL at 19:37

## 2024-07-01 RX ADMIN — SENNOSIDES 8.6 MG: 8.6 TABLET, FILM COATED ORAL at 10:31

## 2024-07-01 RX ADMIN — NIFEDIPINE 30 MG: 30 TABLET, EXTENDED RELEASE ORAL at 14:54

## 2024-07-01 RX ADMIN — DOCUSATE SODIUM 100 MG: 100 CAPSULE, LIQUID FILLED ORAL at 19:36

## 2024-07-01 RX ADMIN — NIFEDIPINE 30 MG: 30 TABLET, EXTENDED RELEASE ORAL at 10:31

## 2024-07-01 RX ADMIN — IBUPROFEN 600 MG: 600 TABLET, FILM COATED ORAL at 19:37

## 2024-07-01 NOTE — LACTATION NOTE
Discharge lactation note:  Rachel reports she has been pumping every 2.5 hours yielding drops. Rachel reports she has been hand expressing and noticing more milk. Edc. On pumping for 15 minutes and then following up with HE for 1-3 minutes on each breast.     Rachel reports she latched Melvina twice yesterday but she feels Melvina tires easily.     Reviewed paced bottle feeding with infant.     Rachel has a Spectra S2 breast pump for home use.     Message sent to baby and me for follow up appointment.     Encouraged to call out with any questions or latch support.

## 2024-07-01 NOTE — PROGRESS NOTES
Progress Note - OB/GYN   Rachel Doe 34 y.o. female MRN: [id number]  Unit/Bed#: -01 Encounter: [id number]      Assessment/Plan    Rachel Doe is a 34 y.o.  who is PPD 2 s/p  at 37w0d     Pre-eclampsia, severe, antepartum, third trimester  Assessment & Plan  S/p 24 hours Mag   - : Procardia XL 30 started  - : normotensive so far, as below:  Systolic (12hrs), Av , Min:125 , Max:134   Diastolic (12hrs), Av, Min:63, Max:77          Obesity affecting pregnancy in third trimester  Assessment & Plan  Pre-gravid BMI 39  Current BMI 45    Hypothyroidism during pregnancy in third trimester  Assessment & Plan  Continue home synthroid    *  (spontaneous vaginal delivery)  Assessment & Plan       Continue routine post partum care  Pain well controlled: tylenol/motrin scheduled  Lochia within normal limits: continue to monitor   OOB: as able, encourage ambulation  Passing flatus  Voiding spontaneously  Breastfeeding  Baby in: NICU  Dispo: anticipate d/c home PPD2-3 pending BP management               Disposition: Anticipate discharge home postpartum Day #2-3  Barriers to discharge: blood pressures      Subjective/Objective     Subjective: Postpartum state    Pain: no  Tolerating PO: yes  Voiding: yes  Flatus: yes  BM: no  Ambulating: yes  Breastfeeding: Breastfeeding and Bottle feeding  Chest pain: no  Shortness of breath: no  Leg pain: no  Lochia: wnl    Objective:     Vitals:  Vitals:    24 1724 24 1946 24 2357 24 0424   BP: 136/68 134/63 125/70 133/77   BP Location: Right arm Right arm Right arm Right arm   Pulse: 94 104 93 91   Resp: 18 18 17 17   Temp: 98 °F (36.7 °C) 98.2 °F (36.8 °C) 98.4 °F (36.9 °C) 98.4 °F (36.9 °C)   TempSrc: Oral Oral Oral Oral   SpO2: 99% 99% 98% 98%   Weight:       Height:           Physical Exam:   GEN: appears well, alert and oriented x 3, pleasant and cooperative   CV: Regular rate  RESP: non labored breathing  ABDOMEN: soft, no  tenderness, no distention, Uterine fundus firm and non-tender, at the umbilicus  EXTREMITIES: non-tender  NEURO Alert and oriented to person, place, and time.       Lab Results   Component Value Date    WBC 8.67 06/28/2024    HGB 11.8 06/28/2024    HCT 36.3 06/28/2024    MCV 90 06/28/2024     06/28/2024         Kristin Crowder DO  Obstetrics & Gynecology  07/01/24

## 2024-07-01 NOTE — CASE MANAGEMENT
Case Management Progress Note    Patient name Rachel Doe  Location /-01 MRN [id number]  : 1990 Date 2024       LOS (days): 3  Geometric Mean LOS (GMLOS) (days):   Days to GMLOS:        OBJECTIVE:        Current admission status: Inpatient  Preferred Pharmacy:   [id number] - [place], [place] - [address]  [address] [facility] [address] [place] [address]  Phone: [phone] Fax: [phone]    [facility], [place] - [address] [address]  [address] [place] [address]  Phone: [phone] Fax: [phone]    Primary Care Provider: Kaitlin Anguiano DO    Primary Insurance: BLUE CROSS  Secondary Insurance:     PROGRESS NOTE:      CM met with MOB to introduce CM services, complete assessment, and provide CM contact info.    MOB had Daughter present and verbalized agreement with personal interview with them present.    MOB reported the following:    Assessment:  Consult reason: Breast Pump/DME and NICU Admission  Gestational Age at Birth: 37 Weeks + 0 Days  MOB Name (& age if teen):   Rachel Doe  FOB Name (& age if teen MOB):   Akil Doe  Other Legal Guardian(s) for Baby:      Other Children:   2 year old daughter  Housing Plan/Lives with:   FOB and child  Insurance Coverage/Plan for Baby: MOB verbalizes that they will contact FOB's insurance to add baby ASAP.  Support System: Family, Friends, and Spouse/Significant Other  Care Items: Car Seat, Crib/Bassinet (Safe Sleep Space), Diapers/Wipes, and Clothing  Method of Feeding: Breast Feeding  Breast Pump: CM ordering/ordered breast pump  MOB requesting [employer] she wants to order from [employer] website.  Requesting paper prescription form to be filled out, SEAN assisted with form, MD signed.  CM faxed to [employer] and provided MOB with copy.   Government Assistance Programs: None   Arrangements: MOB  Current Employment/Schooling: MOB employed Part Time  Mental Health  History and/or Treatment:   None reported   Substance Use History and/or Treatment:  None reported   Urine Drug Screen Results: Not Applicable  Children & Youth History: None  Current Legal Issues: N/A  Domestic/Intimate Partner Violence History: Denies.  NICU Resources: N/A    Discharge Plan:  Pediatrician:   Dr. Maribel Leal  Prenatal/ Care:  [facility]  Follow-Up Appointments Needed/Scheduled: TBD  Medications/DME/Other Referrals: TBD  Transportation Plan: MOB has a vehicle    Follow-Up Needed from Care Management:     CM completed NICU open, CM provided copy of breast pump prescription.  No other needs.

## 2024-07-01 NOTE — QUICK NOTE
Patient continuing to have elevated blood pressures despite Procardia 30.  Plan to increase Procardia XL to 60 mg daily.  Recommend continue admission for blood pressure monitoring in light of recent titration.  Patient is agreeable to plan.  Patient denies headache, vision change, chest pain, shortness of breath, right chronic pain, new sudden swelling.  Also plan for 5-day course of Lasix 20 mg p.o.  Discussed with Dr. Aleman

## 2024-07-01 NOTE — PLAN OF CARE
Problem: PAIN - ADULT  Goal: Verbalizes/displays adequate comfort level or baseline comfort level  Description: Interventions:  - Encourage patient to monitor pain and request assistance  - Assess pain using appropriate pain scale  - Administer analgesics based on type and severity of pain and evaluate response  - Implement non-pharmacological measures as appropriate and evaluate response  - Consider cultural and social influences on pain and pain management  - Notify physician/advanced practitioner if interventions unsuccessful or patient reports new pain  Outcome: Progressing     Problem: INFECTION - ADULT  Goal: Absence or prevention of progression during hospitalization  Description: INTERVENTIONS:  - Assess and monitor for signs and symptoms of infection  - Monitor lab/diagnostic results  - Monitor all insertion sites, i.e. indwelling lines, tubes, and drains  - Monitor endotracheal if appropriate and nasal secretions for changes in amount and color  - [place] appropriate cooling/warming therapies per order  - Administer medications as ordered  - Instruct and encourage patient and family to use good hand hygiene technique  - Identify and instruct in appropriate isolation precautions for identified infection/condition  Outcome: Progressing  Goal: Absence of fever/infection during neutropenic period  Description: INTERVENTIONS:  - Monitor WBC    Outcome: Progressing     Problem: SAFETY ADULT  Goal: Patient will remain free of falls  Description: INTERVENTIONS:  - Educate patient/family on patient safety including physical limitations  - Instruct patient to call for assistance with activity   - Consult OT/PT to assist with strengthening/mobility   - Keep Call bell within reach  - Keep bed low and locked with side rails adjusted as appropriate  - Keep care items and personal belongings within reach  - Initiate and maintain comfort rounds  - Make Fall Risk Sign visible to staff  - Offer Toileting every 2-3  Hours, in advance of need  - Initiate/Maintain alarm  - Obtain necessary fall risk management equipment:   - Apply yellow socks and bracelet for high fall risk patients  - Consider moving patient to room near nurses station  Outcome: Progressing  Goal: Maintain or return to baseline ADL function  Description: INTERVENTIONS:  -  Assess patient's ability to carry out ADLs; assess patient's baseline for ADL function and identify physical deficits which impact ability to perform ADLs (bathing, care of mouth/teeth, toileting, grooming, dressing, etc.)  - Assess/evaluate cause of self-care deficits   - Assess range of motion  - Assess patient's mobility; develop plan if impaired  - Assess patient's need for assistive devices and provide as appropriate  - Encourage maximum independence but intervene and supervise when necessary  - Involve family in performance of ADLs  - Assess for home care needs following discharge   - Consider OT consult to assist with ADL evaluation and planning for discharge  - Provide patient education as appropriate  Outcome: Progressing  Goal: Maintains/Returns to pre admission functional level  Description: INTERVENTIONS:  - Perform AM-PAC 6 Click Basic Mobility/ Daily Activity assessment daily.  - Set and communicate daily mobility goal to care team and patient/family/caregiver.   - Collaborate with rehabilitation services on mobility goals if consulted  - Out of bed for toileting  - Record patient progress and toleration of activity level   Outcome: Progressing     Problem: DISCHARGE PLANNING  Goal: Discharge to home or other facility with appropriate resources  Description: INTERVENTIONS:  - Identify barriers to discharge w/patient and caregiver  - Arrange for needed discharge resources and transportation as appropriate  - Identify discharge learning needs (meds, wound care, etc.)  - Arrange for interpretive services to assist at discharge as needed  - Refer to Case Management Department for  coordinating discharge planning if the patient needs post-hospital services based on physician/advanced practitioner order or complex needs related to functional status, cognitive ability, or social support system  Outcome: Progressing     Problem: POSTPARTUM  Goal: Experiences normal postpartum course  Description: INTERVENTIONS:  - Monitor maternal vital signs  - Assess uterine involution and lochia  Outcome: Progressing  Goal: Appropriate maternal -  bonding  Description: INTERVENTIONS:  - Identify family support  - Assess for appropriate maternal/infant bonding   -Encourage maternal/infant bonding opportunities  - Referral to  or  as needed  Outcome: Progressing  Goal: Establishment of infant feeding pattern  Description: INTERVENTIONS:  - Assess breast/bottle feeding  - Refer to lactation as needed  Outcome: Progressing

## 2024-07-01 NOTE — PLAN OF CARE
Problem: PAIN - ADULT  Goal: Verbalizes/displays adequate comfort level or baseline comfort level  Description: Interventions:  - Encourage patient to monitor pain and request assistance  - Assess pain using appropriate pain scale  - Administer analgesics based on type and severity of pain and evaluate response  - Implement non-pharmacological measures as appropriate and evaluate response  - Consider cultural and social influences on pain and pain management  - Notify physician/advanced practitioner if interventions unsuccessful or patient reports new pain  Outcome: Progressing     Problem: INFECTION - ADULT  Goal: Absence or prevention of progression during hospitalization  Description: INTERVENTIONS:  - Assess and monitor for signs and symptoms of infection  - Monitor lab/diagnostic results  - Monitor all insertion sites, i.e. indwelling lines, tubes, and drains  - Monitor endotracheal if appropriate and nasal secretions for changes in amount and color  - [place] appropriate cooling/warming therapies per order  - Administer medications as ordered  - Instruct and encourage patient and family to use good hand hygiene technique  - Identify and instruct in appropriate isolation precautions for identified infection/condition  Outcome: Progressing  Goal: Absence of fever/infection during neutropenic period  Description: INTERVENTIONS:  - Monitor WBC    Outcome: Progressing     Problem: SAFETY ADULT  Goal: Patient will remain free of falls  Description: INTERVENTIONS:  - Educate patient/family on patient safety including physical limitations  - Instruct patient to call for assistance with activity   - Consult OT/PT to assist with strengthening/mobility   - Keep Call bell within reach  - Keep bed low and locked with side rails adjusted as appropriate  - Keep care items and personal belongings within reach  - Initiate and maintain comfort rounds  - Make Fall Risk Sign visible to staff  - Offer Toileting every 2-3  Hours, in advance of need  - Initiate/Maintain alarm  - Obtain necessary fall risk management equipment:   - Apply yellow socks and bracelet for high fall risk patients  - Consider moving patient to room near nurses station  Outcome: Progressing  Goal: Maintain or return to baseline ADL function  Description: INTERVENTIONS:  -  Assess patient's ability to carry out ADLs; assess patient's baseline for ADL function and identify physical deficits which impact ability to perform ADLs (bathing, care of mouth/teeth, toileting, grooming, dressing, etc.)  - Assess/evaluate cause of self-care deficits   - Assess range of motion  - Assess patient's mobility; develop plan if impaired  - Assess patient's need for assistive devices and provide as appropriate  - Encourage maximum independence but intervene and supervise when necessary  - Involve family in performance of ADLs  - Assess for home care needs following discharge   - Consider OT consult to assist with ADL evaluation and planning for discharge  - Provide patient education as appropriate  Outcome: Progressing  Goal: Maintains/Returns to pre admission functional level  Description: INTERVENTIONS:  - Perform AM-PAC 6 Click Basic Mobility/ Daily Activity assessment daily.  - Set and communicate daily mobility goal to care team and patient/family/caregiver.   - Collaborate with rehabilitation services on mobility goals if consulted  - Out of bed for toileting  - Record patient progress and toleration of activity level   Outcome: Progressing     Problem: DISCHARGE PLANNING  Goal: Discharge to home or other facility with appropriate resources  Description: INTERVENTIONS:  - Identify barriers to discharge w/patient and caregiver  - Arrange for needed discharge resources and transportation as appropriate  - Identify discharge learning needs (meds, wound care, etc.)  - Arrange for interpretive services to assist at discharge as needed  - Refer to Case Management Department for  coordinating discharge planning if the patient needs post-hospital services based on physician/advanced practitioner order or complex needs related to functional status, cognitive ability, or social support system  Outcome: Progressing     Problem: POSTPARTUM  Goal: Experiences normal postpartum course  Description: INTERVENTIONS:  - Monitor maternal vital signs  - Assess uterine involution and lochia  Outcome: Progressing  Goal: Appropriate maternal -  bonding  Description: INTERVENTIONS:  - Identify family support  - Assess for appropriate maternal/infant bonding   -Encourage maternal/infant bonding opportunities  - Referral to  or  as needed  Outcome: Progressing  Goal: Establishment of infant feeding pattern  Description: INTERVENTIONS:  - Assess breast/bottle feeding  - Refer to lactation as needed  Outcome: Progressing

## 2024-07-02 VITALS
SYSTOLIC BLOOD PRESSURE: 135 MMHG | OXYGEN SATURATION: 97 % | HEART RATE: 89 BPM | WEIGHT: 283.2 LBS | RESPIRATION RATE: 18 BRPM | DIASTOLIC BLOOD PRESSURE: 80 MMHG | TEMPERATURE: 98.5 F | BODY MASS INDEX: 45.51 KG/M2 | HEIGHT: 66 IN

## 2024-07-02 PROCEDURE — 99024 POSTOP FOLLOW-UP VISIT: CPT | Performed by: OBSTETRICS & GYNECOLOGY

## 2024-07-02 RX ORDER — NIFEDIPINE 30 MG/1
60 TABLET, EXTENDED RELEASE ORAL DAILY
Qty: 60 TABLET | Refills: 1 | Status: SHIPPED | OUTPATIENT
Start: 2024-07-03

## 2024-07-02 RX ORDER — FUROSEMIDE 20 MG/1
20 TABLET ORAL DAILY
Qty: 4 TABLET | Refills: 0 | Status: SHIPPED | OUTPATIENT
Start: 2024-07-02 | End: 2024-07-06

## 2024-07-02 RX ORDER — ACETAMINOPHEN 325 MG/1
650 TABLET ORAL EVERY 6 HOURS SCHEDULED
Start: 2024-07-02

## 2024-07-02 RX ORDER — IBUPROFEN 600 MG/1
600 TABLET ORAL EVERY 6 HOURS SCHEDULED
Start: 2024-07-02

## 2024-07-02 RX ADMIN — DOCUSATE SODIUM 100 MG: 100 CAPSULE, LIQUID FILLED ORAL at 08:16

## 2024-07-02 RX ADMIN — SENNOSIDES 8.6 MG: 8.6 TABLET, FILM COATED ORAL at 08:16

## 2024-07-02 RX ADMIN — NIFEDIPINE 60 MG: 30 TABLET, EXTENDED RELEASE ORAL at 08:16

## 2024-07-02 NOTE — PROGRESS NOTES
Progress Note - OB/GYN   Rachel Doe 34 y.o. female MRN: [id number]  Unit/Bed#: -01 Encounter: [id number]      Assessment/Plan    Rachel Doe is a 34 y.o.  who is PPD#3 s/p  at 37w0d     Pre-eclampsia, severe, antepartum, third trimester  Assessment & Plan  S/p 24 hours Mag   - : Procardia XL 30 started  - : increased Procardia to XL 60 mg qD  Consider discharge pending BP control today         Obesity affecting pregnancy in third trimester  Assessment & Plan  Pre-gravid BMI 39  Current BMI 45    Hypothyroidism during pregnancy in third trimester  Assessment & Plan  Continue home synthroid    *  (spontaneous vaginal delivery)  Assessment & Plan       Continue routine post partum care  Pain well controlled: tylenol/motrin scheduled  Lochia within normal limits: continue to monitor   OOB: as able, encourage ambulation  Passing flatus  Voiding spontaneously  Breastfeeding  Baby in: NICU  Dispo: anticipate d/c home PPD2-3 pending BP management               Disposition: Anticipate discharge home postpartum Day #3-4  Barriers to discharge: blood pressures      Subjective/Objective     Subjective: Postpartum state    Pain: no  Tolerating PO: yes  Voiding: yes  Flatus: yes  BM: no  Ambulating: yes  Breastfeeding: Breastfeeding and Bottle feeding  Chest pain: no  Shortness of breath: no  Leg pain: no  Lochia: wnl    Objective:     Vitals:  Vitals:    24 1354 24 2108 24 0156 24 0503   BP: 144/91 155/82 127/69 129/58   BP Location: Right arm Right arm Right arm Left arm   Pulse: 93 92 91 93   Resp:  18 18 18   Temp:  98.5 °F (36.9 °C) 98.2 °F (36.8 °C) 98 °F (36.7 °C)   TempSrc:  Oral Oral Oral   SpO2:  98% 98% 98%   Weight:       Height:           Physical Exam:   GEN: appears well, alert and oriented x 3, pleasant and cooperative   CV: Regular rate  RESP: non labored breathing  ABDOMEN: soft, no tenderness, no distention, Uterine fundus firm and non-tender, at the  umbilicus  EXTREMITIES: non-tender  NEURO Alert and oriented to person, place, and time.       Lab Results   Component Value Date    WBC 8.67 06/28/2024    HGB 11.8 06/28/2024    HCT 36.3 06/28/2024    MCV 90 06/28/2024     06/28/2024         Kaitlin Wilson MD  Obstetrics & Gynecology  07/02/24

## 2024-07-02 NOTE — PLAN OF CARE
Problem: PAIN - ADULT  Goal: Verbalizes/displays adequate comfort level or baseline comfort level  Description: Interventions:  - Encourage patient to monitor pain and request assistance  - Assess pain using appropriate pain scale  - Administer analgesics based on type and severity of pain and evaluate response  - Implement non-pharmacological measures as appropriate and evaluate response  - Consider cultural and social influences on pain and pain management  - Notify physician/advanced practitioner if interventions unsuccessful or patient reports new pain  Outcome: Progressing     Problem: INFECTION - ADULT  Goal: Absence or prevention of progression during hospitalization  Description: INTERVENTIONS:  - Assess and monitor for signs and symptoms of infection  - Monitor lab/diagnostic results  - Monitor all insertion sites, i.e. indwelling lines, tubes, and drains  - Monitor endotracheal if appropriate and nasal secretions for changes in amount and color  - [place] appropriate cooling/warming therapies per order  - Administer medications as ordered  - Instruct and encourage patient and family to use good hand hygiene technique  - Identify and instruct in appropriate isolation precautions for identified infection/condition  Outcome: Progressing  Goal: Absence of fever/infection during neutropenic period  Description: INTERVENTIONS:  - Monitor WBC    Outcome: Progressing     Problem: SAFETY ADULT  Goal: Patient will remain free of falls  Description: INTERVENTIONS:  - Educate patient/family on patient safety including physical limitations  - Instruct patient to call for assistance with activity   - Consult OT/PT to assist with strengthening/mobility   - Keep Call bell within reach  - Keep bed low and locked with side rails adjusted as appropriate  - Keep care items and personal belongings within reach  - Initiate and maintain comfort rounds  - Make Fall Risk Sign visible to staff  - Offer Toileting every 2-3  Hours, in advance of need  - Initiate/Maintain alarm  - Obtain necessary fall risk management equipment:   - Apply yellow socks and bracelet for high fall risk patients  - Consider moving patient to room near nurses station  Outcome: Progressing  Goal: Maintain or return to baseline ADL function  Description: INTERVENTIONS:  -  Assess patient's ability to carry out ADLs; assess patient's baseline for ADL function and identify physical deficits which impact ability to perform ADLs (bathing, care of mouth/teeth, toileting, grooming, dressing, etc.)  - Assess/evaluate cause of self-care deficits   - Assess range of motion  - Assess patient's mobility; develop plan if impaired  - Assess patient's need for assistive devices and provide as appropriate  - Encourage maximum independence but intervene and supervise when necessary  - Involve family in performance of ADLs  - Assess for home care needs following discharge   - Consider OT consult to assist with ADL evaluation and planning for discharge  - Provide patient education as appropriate  Outcome: Progressing  Goal: Maintains/Returns to pre admission functional level  Description: INTERVENTIONS:  - Perform AM-PAC 6 Click Basic Mobility/ Daily Activity assessment daily.  - Set and communicate daily mobility goal to care team and patient/family/caregiver.   - Collaborate with rehabilitation services on mobility goals if consulted  - Out of bed for toileting  - Record patient progress and toleration of activity level   Outcome: Progressing     Problem: DISCHARGE PLANNING  Goal: Discharge to home or other facility with appropriate resources  Description: INTERVENTIONS:  - Identify barriers to discharge w/patient and caregiver  - Arrange for needed discharge resources and transportation as appropriate  - Identify discharge learning needs (meds, wound care, etc.)  - Arrange for interpretive services to assist at discharge as needed  - Refer to Case Management Department for  coordinating discharge planning if the patient needs post-hospital services based on physician/advanced practitioner order or complex needs related to functional status, cognitive ability, or social support system  Outcome: Progressing     Problem: POSTPARTUM  Goal: Experiences normal postpartum course  Description: INTERVENTIONS:  - Monitor maternal vital signs  - Assess uterine involution and lochia  Outcome: Progressing  Goal: Appropriate maternal -  bonding  Description: INTERVENTIONS:  - Identify family support  - Assess for appropriate maternal/infant bonding   -Encourage maternal/infant bonding opportunities  - Referral to  or  as needed  Outcome: Progressing  Goal: Establishment of infant feeding pattern  Description: INTERVENTIONS:  - Assess breast/bottle feeding  - Refer to lactation as needed  Outcome: Progressing

## 2024-07-02 NOTE — PLAN OF CARE
Problem: PAIN - ADULT  Goal: Verbalizes/displays adequate comfort level or baseline comfort level  Description: Interventions:  - Encourage patient to monitor pain and request assistance  - Assess pain using appropriate pain scale  - Administer analgesics based on type and severity of pain and evaluate response  - Implement non-pharmacological measures as appropriate and evaluate response  - Consider cultural and social influences on pain and pain management  - Notify physician/advanced practitioner if interventions unsuccessful or patient reports new pain  Outcome: Adequate for Discharge     Problem: INFECTION - ADULT  Goal: Absence or prevention of progression during hospitalization  Description: INTERVENTIONS:  - Assess and monitor for signs and symptoms of infection  - Monitor lab/diagnostic results  - Monitor all insertion sites, i.e. indwelling lines, tubes, and drains  - Monitor endotracheal if appropriate and nasal secretions for changes in amount and color  - [place] appropriate cooling/warming therapies per order  - Administer medications as ordered  - Instruct and encourage patient and family to use good hand hygiene technique  - Identify and instruct in appropriate isolation precautions for identified infection/condition  Outcome: Adequate for Discharge  Goal: Absence of fever/infection during neutropenic period  Description: INTERVENTIONS:  - Monitor WBC    Outcome: Adequate for Discharge     Problem: SAFETY ADULT  Goal: Patient will remain free of falls  Description: INTERVENTIONS:  - Educate patient/family on patient safety including physical limitations  - Instruct patient to call for assistance with activity   - Consult OT/PT to assist with strengthening/mobility   - Keep Call bell within reach  - Keep bed low and locked with side rails adjusted as appropriate  - Keep care items and personal belongings within reach  - Initiate and maintain comfort rounds  - Make Fall Risk Sign visible to  staff  - Offer Toileting every 2-3 Hours, in advance of need  - Initiate/Maintain alarm  - Obtain necessary fall risk management equipment:   - Apply yellow socks and bracelet for high fall risk patients  - Consider moving patient to room near nurses station  Outcome: Adequate for Discharge  Goal: Maintain or return to baseline ADL function  Description: INTERVENTIONS:  -  Assess patient's ability to carry out ADLs; assess patient's baseline for ADL function and identify physical deficits which impact ability to perform ADLs (bathing, care of mouth/teeth, toileting, grooming, dressing, etc.)  - Assess/evaluate cause of self-care deficits   - Assess range of motion  - Assess patient's mobility; develop plan if impaired  - Assess patient's need for assistive devices and provide as appropriate  - Encourage maximum independence but intervene and supervise when necessary  - Involve family in performance of ADLs  - Assess for home care needs following discharge   - Consider OT consult to assist with ADL evaluation and planning for discharge  - Provide patient education as appropriate  Outcome: Adequate for Discharge  Goal: Maintains/Returns to pre admission functional level  Description: INTERVENTIONS:  - Perform AM-PAC 6 Click Basic Mobility/ Daily Activity assessment daily.  - Set and communicate daily mobility goal to care team and patient/family/caregiver.   - Collaborate with rehabilitation services on mobility goals if consulted  - Out of bed for toileting  - Record patient progress and toleration of activity level   Outcome: Adequate for Discharge     Problem: DISCHARGE PLANNING  Goal: Discharge to home or other facility with appropriate resources  Description: INTERVENTIONS:  - Identify barriers to discharge w/patient and caregiver  - Arrange for needed discharge resources and transportation as appropriate  - Identify discharge learning needs (meds, wound care, etc.)  - Arrange for interpretive services to assist  at discharge as needed  - Refer to Case Management Department for coordinating discharge planning if the patient needs post-hospital services based on physician/advanced practitioner order or complex needs related to functional status, cognitive ability, or social support system  Outcome: Adequate for Discharge     Problem: POSTPARTUM  Goal: Experiences normal postpartum course  Description: INTERVENTIONS:  - Monitor maternal vital signs  - Assess uterine involution and lochia  Outcome: Adequate for Discharge  Goal: Appropriate maternal -  bonding  Description: INTERVENTIONS:  - Identify family support  - Assess for appropriate maternal/infant bonding   -Encourage maternal/infant bonding opportunities  - Referral to  or  as needed  Outcome: Adequate for Discharge  Goal: Establishment of infant feeding pattern  Description: INTERVENTIONS:  - Assess breast/bottle feeding  - Refer to lactation as needed  Outcome: Adequate for Discharge

## 2024-07-03 ENCOUNTER — TELEPHONE (OUTPATIENT)
Age: 34
End: 2024-07-03

## 2024-07-03 NOTE — TELEPHONE ENCOUNTER
Patient called to schedule 1 week bp check, delivered 6/29. S/w Falguni in office who asked to call the pt right back. Pt agreeable & had no further questions.

## 2024-07-05 PROCEDURE — 88307 TISSUE EXAM BY PATHOLOGIST: CPT | Performed by: STUDENT IN AN ORGANIZED HEALTH CARE EDUCATION/TRAINING PROGRAM

## 2024-07-09 ENCOUNTER — POSTPARTUM VISIT (OUTPATIENT)
Dept: OBGYN CLINIC | Facility: CLINIC | Age: 34
End: 2024-07-09

## 2024-07-09 VITALS — SYSTOLIC BLOOD PRESSURE: 126 MMHG | DIASTOLIC BLOOD PRESSURE: 72 MMHG

## 2024-07-09 DIAGNOSIS — O14.13 PRE-ECLAMPSIA, SEVERE, ANTEPARTUM, THIRD TRIMESTER: Primary | ICD-10-CM

## 2024-07-09 PROCEDURE — 99024 POSTOP FOLLOW-UP VISIT: CPT | Performed by: OBSTETRICS & GYNECOLOGY

## 2024-07-09 NOTE — PROGRESS NOTES
Pre-eclampsia, severe, antepartum, third trimester  BP WNL  Plan to decrease procardia to 30mg daily. Follow up scheduled for next week.       Patient here for BP check following  24 @ 36 weeks. Procardia 30mg. 126/72 today

## 2024-07-19 ENCOUNTER — POSTPARTUM VISIT (OUTPATIENT)
Dept: OBGYN CLINIC | Facility: CLINIC | Age: 34
End: 2024-07-19

## 2024-07-19 VITALS
WEIGHT: 262.4 LBS | HEIGHT: 66 IN | DIASTOLIC BLOOD PRESSURE: 72 MMHG | SYSTOLIC BLOOD PRESSURE: 136 MMHG | BODY MASS INDEX: 42.17 KG/M2

## 2024-07-19 DIAGNOSIS — O14.13 PRE-ECLAMPSIA, SEVERE, ANTEPARTUM, THIRD TRIMESTER: ICD-10-CM

## 2024-07-19 PROBLEM — Z87.59 HISTORY OF GESTATIONAL HYPERTENSION: Status: RESOLVED | Noted: 2024-01-20 | Resolved: 2024-07-19

## 2024-07-19 PROBLEM — R00.2 INTERMITTENT PALPITATIONS: Status: RESOLVED | Noted: 2024-05-29 | Resolved: 2024-07-19

## 2024-07-19 PROBLEM — E03.9 HYPOTHYROIDISM DURING PREGNANCY IN THIRD TRIMESTER: Status: RESOLVED | Noted: 2024-01-20 | Resolved: 2024-07-19

## 2024-07-19 PROBLEM — Z34.90 ENCOUNTER FOR INDUCTION OF LABOR: Status: RESOLVED | Noted: 2024-06-28 | Resolved: 2024-07-19

## 2024-07-19 PROBLEM — O99.283 HYPOTHYROIDISM DURING PREGNANCY IN THIRD TRIMESTER: Status: RESOLVED | Noted: 2024-01-20 | Resolved: 2024-07-19

## 2024-07-19 PROBLEM — O99.213 OBESITY AFFECTING PREGNANCY IN THIRD TRIMESTER: Status: RESOLVED | Noted: 2024-01-20 | Resolved: 2024-07-19

## 2024-07-19 PROBLEM — O35.EXX0 FETAL RENAL ANOMALY, SINGLE GESTATION: Status: RESOLVED | Noted: 2024-03-12 | Resolved: 2024-07-19

## 2024-07-19 PROBLEM — O09.523 MULTIGRAVIDA OF ADVANCED MATERNAL AGE IN THIRD TRIMESTER: Status: RESOLVED | Noted: 2024-01-20 | Resolved: 2024-07-19

## 2024-07-19 PROCEDURE — 99024 POSTOP FOLLOW-UP VISIT: CPT | Performed by: OBSTETRICS & GYNECOLOGY

## 2024-07-19 NOTE — ASSESSMENT & PLAN NOTE
Plan to continue procardia 30mg daily until 6 weeks.  Has BP cuff at home. Will call if severe range occurs

## 2024-07-19 NOTE — PROGRESS NOTES
Patient is here for her postpartum visit    Delivered to term on 6/29/24 at 37.0 weeks.   Baby Melvina   Weight: 6lbs 14.4oz  Vaginal Delivery  --1 degree laceration  APGARS= 7/9  Patient is breast feeding  Bleeding has stopped   Interested in birth control- No  EPDS= 5  Substance Abuse Screening=no risk    No issues or concerns to report at this time.

## 2024-07-19 NOTE — PROGRESS NOTES
Assessment/Plan:   Pre-eclampsia, severe, antepartum, third trimester  Plan to continue procardia 30mg daily until 6 weeks.  Has BP cuff at home. Will call if severe range occurs     Diagnoses and all orders for this visit:    Postpartum exam  Comments:  Healing well. Contraception: plan for partner vasectomy, paragard discussed. EPDS 5    Pre-eclampsia, severe, antepartum, third trimester          Subjective:     Patient ID: Rachel Doe is a 34 y.o. female.    Patient is here for her postpartum visit    Delivered to term on 6/29/24 at 37.0 weeks.   Baby Melvina   Weight: 6lbs 14.4oz  Vaginal Delivery  --1 degree laceration  APGARS= 7/9  Patient is breast feeding  Bleeding has stopped   Interested in birth control- No  EPDS= 5  Substance Abuse Screening=no risk    No issues or concerns to report at this time.       Review of Systems   Constitutional:  Negative for activity change, appetite change, chills, fatigue and fever.   HENT:  Negative for rhinorrhea, sneezing and sore throat.    Eyes:  Negative for visual disturbance.   Respiratory:  Negative for cough, shortness of breath and wheezing.    Cardiovascular:  Negative for chest pain, palpitations and leg swelling.   Gastrointestinal:  Negative for abdominal distention, constipation, diarrhea, nausea and vomiting.   Genitourinary:  Negative for difficulty urinating.   Neurological:  Negative for syncope and light-headedness.         Objective:     Physical Exam  Genitourinary:     Labia:         Right: No rash, tenderness or lesion.         Left: No rash, tenderness or lesion.       Vagina: Normal. No vaginal discharge, erythema, tenderness or bleeding.      Cervix: No cervical motion tenderness, discharge, friability or erythema.      Uterus: Not deviated, not enlarged, not fixed and not tender.       Adnexa:         Right: No mass, tenderness or fullness.          Left: No mass, tenderness or fullness.

## 2024-08-09 ENCOUNTER — OFFICE VISIT (OUTPATIENT)
Dept: POSTPARTUM | Facility: CLINIC | Age: 34
End: 2024-08-09
Payer: COMMERCIAL

## 2024-08-09 PROCEDURE — 99404 PREV MED CNSL INDIV APPRX 60: CPT | Performed by: PEDIATRICS

## 2024-08-09 NOTE — PATIENT INSTRUCTIONS
"Continue to offer the breast as often as you feel comfortable.  Gently compress the breast and align the baby so that her nose is just above the nipple with her lower lip and chin touching the breast to encourage the deepest, widest, off-center latch.   Continue pumping when a feeding at the breast is missed.  When pumping, cycle your pump through stimulation and expression mode several times in a session to stimulate several let downs until you have expressed enough milk to feed the baby and to achieve breast comfort.  There is no need to \"empty\" the breast completely. Use gentle hands on pumping and hand expression   Maintain your pump as recommended. Use flange that fits comfortably and allows the breast to empty effectively.    Follow up as scheduled.  Please call with any questions or concerns.    "

## 2024-08-09 NOTE — PROGRESS NOTES
INITIAL BREAST FEEDING EVALUATION    Informant/Relationship: Rachel    Discussion of General Lactation Issues: Melvina was born early due to Rachel's pregnancy complicated by preeclampsia and Melvina being in the NICU for the first week. Melvina was primarily bottle fed while she was in the NICU. After discharge,  Rachel continued to pump and feed fortified expressed milk for about a week.  Rachel was worried that Melvina would not grow well if she just  and continued to primarily bottle feed.  She is trying to establish direct breastfeeding but at this point, Melvina does not seem interested.      Infant is 5 weeks old today.        History:  Fertility Problem:yes - first pregnancy was a chemical pregnancy conceived during fertility treatment.  Second pregnancy conceived through IUI.  Third pregnancy conceived naturally but was an early loss.  This pregnancy was unplanned.  Breast changes:yes - breasts were tender early.  Breasts were amin.  Areolas were larger and darker.  Prominent veining.  : yes - induced due to maternal preeclampsia  Full term: No. 37 weeks   labor:no  First nursing/attempt < 1 hour after birth: No.  Baby went immediately to the NICU.  First opportunity to breastfeed was on DOL #2  Skin to skin following delivery: No.  First STS was 24 hours after delivery  Breast changes after delivery:yes - breasts are amin, milk was in by day 3-4  Rooming in (infant in room with mother with exception of procedures, eg. Circumcision: no  Blood sugar issues:no  NICU stay:yes - for TTN  Jaundice: yes  Phototherapy: yes  Supplement given: (list supplement and method used as well as reason(s):yes - donor milk via NGT and then bottle    Past Medical History:   Diagnosis Date    Anxiety     Disease of thyroid gland     hypo    Female infertility 2020    Gestational hypertension     Hypertension     Hypothyroidism     dx 2021    Migraine     PCOS (polycystic ovarian syndrome)      Varicella          Current Outpatient Medications:     NIFEdipine (PROCARDIA XL) 30 mg 24 hr tablet, Take 1 tablet (30 mg total) by mouth daily, Disp: 30 tablet, Rfl: 1    levothyroxine 50 mcg tablet, TAKE 1 TABLET BY MOUTH EVERY DAY, Disp: 30 tablet, Rfl: 5    Prenatal Vit-Fe Fumarate-FA (PRENATAL VITAMINS PO), Take 1 tablet by mouth daily at bedtime, Disp: , Rfl:     Allergies   Allergen Reactions    Co Q10 [Ubidecarenone] Hives       Social History     Substance and Sexual Activity   Drug Use Never       Social History Former smoker    Interval Breastfeeding History:  Frequency of breast feeding: At most once a day.  Max 3 times a day  Does mother feel breastfeeding is effective: No  Does infant appear satisfied after nursing:No  Stooling pattern normal: Yes  Urinating frequently:Yes  Using shield or shells: No    Alternative/Artificial Feedings:   Bottle: Yes, Melvina has been almost exclusively bottle fed since birth.  Using a Medela and Nuk bottle with paced feeding technique.  Cup: No  Syringe/Finger: No           Formula Type: none recently                     Amount: n/a            Breast Milk:                      Amount: 3-4 ounces             Frequency Q 4 Hr between feedings  Elimination Problems: No      Equipment:  Nipple Shield             Type: none             Size: n/a             Frequency of Use: n/a  Pump            Type: Spectra Synergy Gold and Spectra S2 and Pumpables            Frequency of Use: 7 times a day.  Almost exclusively uses the Synergy Gold.  Expresses an average of 3 ounces per pumping session.  She is just meeting Melvina's needs  Shells            Type: none            Frequency of use: n/a    Equipment Problems: yes Rachel's nipples are sore    Mom:  Breast: Large symmetrical breasts.  Slightly conical shape.  Closely spaced.   Nipple Assessment in General: Normal: elongated/eraser, no discoloration and no damage noted.  Mother's Awareness of Feeding Cues                  Recognizes: Yes                  Verbalizes: Yes  Support System: FOB, extended family  History of Breastfeeding: Breastfeeding was never established with her older child.  Rachel tried pumping but was only able to express low volumes  Changes/Stressors/Violence: Rachel is concerned that Melvina is not nursing  Concerns/Goals: Rachel desires to directly breastfeed.  She would like to increase milk production    Problems with Mom: none    Physical Exam  Constitutional:       Appearance: She is obese.   HENT:      Head: Normocephalic and atraumatic.   Pulmonary:      Effort: Pulmonary effort is normal.   Musculoskeletal:         General: Normal range of motion.      Cervical back: Normal range of motion and neck supple.   Neurological:      Mental Status: She is alert and oriented to person, place, and time.   Skin:     General: Skin is warm and dry.   Psychiatric:         Mood and Affect: Mood normal.         Behavior: Behavior normal.         Thought Content: Thought content normal.         Judgment: Judgment normal.         Infant:  Behaviors: Alert  Color: Normal  Birth weight: 3130 grams  Current weight: 3850 grams    Problems with infant: not nursing effectively      General Appearance:  Alert, active, no distress                            Head:  Normocephalic, AFOF, sutures opposed                            Eyes:   Conjunctiva clear, no drainage                            Ears:   Normally placed, no anomolies                           Nose:   No drainage or erythema                          Mouth:  No lesions. Tongue did not extend beyond the lower alveolar ridge.  Some lateralization was observed.  The tip of the tongue elevates when crying but the body of the tongue remains flat. The palate is a little high.  No cupping or peristalsis established during the exam.  Melvina just bit my finger.  The attempt was stopped when she became stressed.                   Neck:  Supple, symmetrical, trachea midline                 Respiratory:  No grunting, flaring, retractions, breath sounds clear and equal           Cardiovascular:  Regular rate and rhythm. No murmur. Adequate perfusion/capillary refill. Femoral pulse present                  Abdomen:    Soft, non-tender, no masses, bowel sounds present, no HSM            Genitourinary:  Normal female genitalia, anus patent                         Spine:   No abnormalities noted       Musculoskeletal:   Full range of motion         Skin/Hair/Nails:   Skin warm, dry, and intact, no rashes or abnormal dyspigmentation or lesions               Neurologic:   No abnormal movement, tone appropriate     Latch:  Efficiency:               Lips Flanged: upper lip neutral on the breast, lower lip flanged              Depth of latch: good              Audible Swallow: Yes, several periods of sustained SSB              Visible Milk: Yes              Wide Open/ Asymmetrical: Yes              Suck Swallow Cycle: Breathing: unlabored, Coordinated: yes  Nipple Assessment after latch: very slightly creased  Latch Problems: None. With effective positioning, Melvina was able to latch without difficulty and with encouragement  from gentle breast compressions was able to feed well.  Rachel had no pain during this feeding.  Melvina fed on 3 breasts and appeared content after feeding.    Position:  Infant's Ergonomics/Body               Body Alignment: Yes               Head Supported: Yes               Close to Mom's body/ Lifted/ Supported: Yes               Mom's Ergonomics/Body: Yes, after education                           Supported: Yes, after education                           Sitting Back: Yes, after education                           Brings Baby to her breast: Yes, after education  Positioning Problems: Initially, Rachel leaned over Melvina with her torso twisted to bring her nipple to Melvina's mouth. She compressed her breast opposite the direction of Melvina's mouth      Handouts:   Paced bottle  feeding, Hands on pumping, Hand expression, and Increasing your supply    Education:  Reviewed Latch: Demonstrated how to gently compress the breast and align the baby so that her nose is just above the nipple with her lower lip and chin touching the breast to encourage the deepest, widest, off-center latch.   Reviewed Positioning for Dyad: Demonstrated how to position mom comfortably and supported with her baby belly to belly  Reviewed Frequency/Supply & Demand: Discussed how milk production is established and regulated.  Reviewed Alternative/Artificial Feedings: Discussed and demonstrated paced bottle feeding  Reviewed Mom/Breast care: Discussed appropriate handling of the breasts to avoid pain, inflammation and trauma.   Reviewed Equipment: Discussed and demonstrated the use and features of the Spectra Synergy Gold.      Plan:    Reassurance and support given.  I acknowledged Rachel's concerns and her desire to breastfeed.  I encouraged her to offer the breast as often as she feels comfortable.  We worked on positioning to improve her comfort and confidence.  I recommended that she continue pumping when a feeding at the breast is missed to maintain milk production.  I suggested lower suction settings to prevent sore nipples.  I reviewed a few galactagogues that may help increase milk production.  Rachel has already purchased a product that contains [place].  An appointment was scheduled to monitor progress.   I encouraged her to call with any questions or concerns.    I have spent 90 minutes with Patient and family today in which greater than 50% of this time was spent in counseling/coordination of care regarding Patient and family education and Counseling / Coordination of care.

## 2024-08-11 NOTE — PROGRESS NOTES
I have reviewed the notes, assessments, and/or procedures performed by Keeley Jules RN, IBCLC, I concur with her/his documentation of Rachel French MD 08/10/24

## 2024-10-18 ENCOUNTER — ANNUAL EXAM (OUTPATIENT)
Dept: OBGYN CLINIC | Facility: CLINIC | Age: 34
End: 2024-10-18
Payer: COMMERCIAL

## 2024-10-18 VITALS
DIASTOLIC BLOOD PRESSURE: 76 MMHG | BODY MASS INDEX: 42.62 KG/M2 | SYSTOLIC BLOOD PRESSURE: 118 MMHG | HEIGHT: 65 IN | WEIGHT: 255.8 LBS

## 2024-10-18 DIAGNOSIS — Z01.419 ENCOUNTER FOR GYNECOLOGICAL EXAMINATION: Primary | ICD-10-CM

## 2024-10-18 DIAGNOSIS — O14.13 PRE-ECLAMPSIA, SEVERE, ANTEPARTUM, THIRD TRIMESTER: ICD-10-CM

## 2024-10-18 PROCEDURE — S0612 ANNUAL GYNECOLOGICAL EXAMINA: HCPCS | Performed by: OBSTETRICS & GYNECOLOGY

## 2024-10-18 NOTE — ASSESSMENT & PLAN NOTE
Continues with procardia at this time, recommend follow up with PCP for management of hypertension

## 2024-10-18 NOTE — PROGRESS NOTES
Ambulatory Visit  Name: Rachel Doe      : 1990      MRN: [id number]  Encounter Provider: Winter Ervin MD  Encounter Date: 10/18/2024   Encounter department: [facility] [place]    Assessment & Plan  Encounter for gynecological examination  Pap/HPV current   Contraception:declines. Discussed progestin, condoms.    Encourage healthy diet, exercise, Calcium 1200mg per day and at least 800 iu Vitamin D daily.         Pre-eclampsia, severe, antepartum, third trimester  Continues with procardia at this time, recommend follow up with PCP for management of hypertension         History of Present Illness     Rachel Doe is a 34 y.o. female who presents or a routine annual visit    Menarche- Y/O  Last Pap Smear- 21 neg/neg  LMP-  Birth control-none    Former smoker  Social drinker  Currently sexually active  No family history of uterine, ovarian, cervical or breast cancer  MGF had colon cancer    No concerns/questions for today's visit  Menses have not returned.  Breastfeeding/pumping    Gynecologic Exam  She reports no genital itching, genital lesions, genital odor, genital rash, pelvic pain, vaginal bleeding or vaginal discharge. The patient is experiencing no pain. Pertinent negatives include no chills, constipation, diarrhea, fever, nausea, sore throat or vomiting.         Review of Systems   Constitutional:  Negative for activity change, appetite change, chills, fatigue and fever.   HENT:  Negative for rhinorrhea, sneezing and sore throat.    Eyes:  Negative for visual disturbance.   Respiratory:  Negative for cough, shortness of breath and wheezing.    Cardiovascular:  Negative for chest pain, palpitations and leg swelling.   Gastrointestinal:  Negative for abdominal distention, constipation, diarrhea, nausea and vomiting.   Genitourinary:  Negative for difficulty urinating, pelvic pain and vaginal discharge.   Neurological:  Negative for syncope and  "light-headedness.           Objective     /76 (BP Location: Left arm, Patient Position: Sitting, Cuff Size: Standard)   Ht 5' 5.25\" (1.657 m)   Wt 116 kg (255 lb 12.8 oz)   BMI 42.24 kg/m²     Physical Exam  Chest:   Breasts:     Breasts are symmetrical.      Right: No inverted nipple, mass, nipple discharge, skin change or tenderness.      Left: No inverted nipple, mass, nipple discharge, skin change or tenderness.   Genitourinary:     Labia:         Right: No rash, tenderness, lesion or injury.         Left: No rash, tenderness, lesion or injury.       Vagina: Normal. No vaginal discharge, erythema, tenderness or bleeding.      Cervix: No cervical motion tenderness, discharge, friability, erythema or cervical bleeding.      Uterus: Not deviated, not enlarged, not fixed and not tender.       Adnexa:         Right: No mass, tenderness or fullness.          Left: No mass, tenderness or fullness.     Neurological:      Mental Status: She is alert and oriented to person, place, and time.         "

## 2024-11-13 DIAGNOSIS — E03.8 SUBCLINICAL HYPOTHYROIDISM: ICD-10-CM

## 2024-11-13 RX ORDER — LEVOTHYROXINE SODIUM 50 UG/1
50 TABLET ORAL DAILY
Qty: 30 TABLET | Refills: 0 | Status: SHIPPED | OUTPATIENT
Start: 2024-11-13

## 2024-12-09 DIAGNOSIS — E03.8 SUBCLINICAL HYPOTHYROIDISM: ICD-10-CM

## 2024-12-11 RX ORDER — LEVOTHYROXINE SODIUM 50 UG/1
50 TABLET ORAL DAILY
Qty: 90 TABLET | Refills: 1 | Status: SHIPPED | OUTPATIENT
Start: 2024-12-11

## 2025-03-31 ENCOUNTER — OFFICE VISIT (OUTPATIENT)
Age: 35
End: 2025-03-31
Payer: COMMERCIAL

## 2025-03-31 VITALS — HEIGHT: 66 IN | WEIGHT: 237 LBS | BODY MASS INDEX: 38.09 KG/M2

## 2025-03-31 DIAGNOSIS — D23.9 DERMATOFIBROMA: ICD-10-CM

## 2025-03-31 DIAGNOSIS — L71.9 ROSACEA: Primary | ICD-10-CM

## 2025-03-31 PROCEDURE — 99203 OFFICE O/P NEW LOW 30 MIN: CPT | Performed by: DERMATOLOGY

## 2025-03-31 NOTE — PROGRESS NOTES
"[facility] Note     Patient Name: Rachel Doe  Encounter Date: 03/31/2025     Have you been cared for by a [facility] Dermatologist in the last 3 years and, if so, which description applies to you?    NO.   I am considered a \"new\" patient and must complete all patient intake questions. I am FEMALE/of child-bearing potential.    REVIEW OF SYSTEMS:  Have you recently had or currently have any of the following? Recent fever or chills? No  Any non-healing wound? No  Are you pregnant or planning to become pregnant? No  Are you currently or planning to be nursing or breast feeding? No   PAST MEDICAL HISTORY:  Have you personally ever had or currently have any of the following?  If \"YES,\" then please provide more detail. Skin cancer (such as Melanoma, Basal Cell Carcinoma, Squamous Cell Carcinoma?  No  Tuberculosis, HIV/AIDS, Hepatitis B or C: No  Radiation Treatment No   HISTORY OF IMMUNOSUPPRESSION:   Do you have a history of any of the following:  Systemic Immunosuppression such as Diabetes, Biologic or Immunotherapy, Chemotherapy, Organ Transplantation, Bone Marrow Transplantation or Prednsione?  No    Answering \"YES\" requires the addition of the dotphrase \"IMMUNOSUPPRESSED\" as the first diagnosis of the patient's visit.   FAMILY HISTORY:  Any \"first degree relatives\" (parent, brother, sister, or child) with the following?    Skin Cancer, Pancreatic or Other Cancer? No   PATIENT EXPERIENCE:    Do you want the Dermatologist to perform a COMPLETE skin exam today including a clinical examination under the \"bra and underwear\" areas?  NO  If necessary, do we have your permission to call and leave a detailed message on your Preferred Phone number that includes your specific medical information?  Yes      Allergies   Allergen Reactions   • Co Q10 [Ubidecarenone] Hives      Current Outpatient Medications:   •  HOMEOPATHIC PRODUCTS PO, Take 2 capsules by mouth 2 (two) times a day Jonah Inositol to manage " "symptoms of PCOS, Disp: , Rfl:   •  levothyroxine 50 mcg tablet, TAKE 1 TABLET BY MOUTH EVERY DAY, Disp: 90 tablet, Rfl: 1  •  Prenatal Vit-Fe Fumarate-FA (PRENATAL VITAMINS PO), Take 1 tablet by mouth daily at bedtime, Disp: , Rfl:   •  NIFEdipine (PROCARDIA XL) 30 mg 24 hr tablet, Take 1 tablet (30 mg total) by mouth daily, Disp: 30 tablet, Rfl: 1          Whom besides the patient is providing clinical information about today's encounter?   NO ADDITIONAL HISTORIAN (patient alone provided history)    Physical Exam and Assessment/Plan by Diagnosis:    DERMATOFIBROMA    Physical Exam:  Anatomic Location Affected:  Right shoulder   Morphological Description:  5 mm tan papule with a positive dimple sign.   Pertinent Positives:  Pertinent Negatives:    Additional History of Present Condition:  present for 8 months, [place] recently had a baby and noticed it shortly after.     Assessment and Plan:  Based on a thorough discussion of this condition and the management approach to it (including a comprehensive discussion of the known risks, side effects and potential benefits of treatment), the patient (family) agrees to implement the following specific plan:  Benign, reassured.     Assessment and Plan:  A dermatofibroma is a common benign fibrous nodule that most often arises on the skin of the lower legs.  A dermatofibroma is also called a \"cutaneous fibrous histiocytoma.\"  Dermatofibromas occur at all ages and in people of every ethnicity. They are more common in women than in men.    It is not clear if dermatofibroma is a reactive process or if it is a neoplasm. The lesions are made up of proliferating fibroblasts. Histiocytes may also be involved.  They are sometimes attributed to an insect bite or ingrownhair or local trauma, but not consistently. They may be more numerous in patients with altered immunity.    Dermatofibromas most often occur on the legs and arms, but may also arise on the trunk or any site of the " "body.  Typical clinical features include the following:  People may have 1 or up to 15 lesions.  Size varies from 0.5-1.5 cm diameter; most lesions are 7-10 mm diameter.  They are firm nodules tethered to the skin surface and mobile over subcutaneous tissue.  The skin \"dimples\" on pinching the lesion.  Color may be pink to light brown in white skin, and dark brown to black in dark skin; some appear paler in the center.  They do not usually cause symptoms, but they are sometimes painful or itchy.  Because they are often raised lesions, they may be traumatized, for example by a razor.  Occasionally dozens may erupt within a few months, usually in the setting of immunosuppression (for example autoimmune disease, cancer or certain medications).  Dermatofibroma does not give rise to cancer. However, occasionally, it may be mistaken for dermatofibrosarcoma or desmoplastic melanoma.    A dermatofibroma is harmless and seldom causes any symptoms. Usually, only reassurance is needed. If it is nuisance or causing concern, the lesion can be removed surgically, resulting in a scar that is, by definition, usually longer in diameter than the widest portion of the dermatofibroma.  Cryotherapy, shave biopsy and laser surgery are rarely completely successful.  Skin punch biopsy or incisional biopsy may be undertaken if there is an atypical feature such as recent enlargement, ulceration, or asymmetrical structures and colours on dermatoscopy.     ROSACEA    Physical Exam:  Anatomic Location Affected:  Bilateral cheeks   Morphological Description:  red blood vessels   Pertinent Positives:  Pertinent Negatives:    Additional History of Present Condition:  present on exam     Assessment and Plan:  Based on a thorough discussion of this condition and the management approach to it (including a comprehensive discussion of the known risks, side effects and potential benefits of treatment), the patient (family) agrees to implement the " following specific plan:  Referred to [facility].     Rosacea is a chronic rash affecting the mid-face including the nose, cheeks, chin, forehead, and eyelids. The incidence is usually greatest between the ages of 30-60 years and is more common in people with fair skin. Common characteristics include redness, telangiectasias, papules and pustules over affected areas. Rosacea may look similar to acne, but there is a lack of comedones. Occasionally the eyes may also be involved in ocular rosacea. In advanced disease, enlargement of the sebaceous glands in the nose, termed rhinophyma, may be present.     Rosacea results in red spots (papules) and sometimes pustules over the face, but unlike acne there are no blackheads, whiteheads, or cystic nodules. Patients often experience increased facial flushing with prominent blood vessels (erythematotelangiectatic rosacea) and dry, sensitive skin. These symptoms are exacerbated by sun exposure, hot or spicy foods, topical steroids and oil-based facial products.     In ocular rosacea, eyelids may be red and sore due to conjunctivitis, keratitis, and episcleritis. If rhinophyma develops due to enlargement of sebaceous glands, the patient may have an enlarged and irregularly shaped nose with prominent pores. In rosacea that is refractory to treatment, patients can develop persistent redness and swelling of the face due to lymphatic obstruction (Morbihan disease).     Distribution around the cheeks may be confused with the malar or “butterfly rash” of lupus. However, the rash of lupus spares the nasal creases and lacks papules and pustules. If signs of photosensitivity, oral ulcers, arthritis, and kidney dysfunction are present then consider referral to a rheumatologist.     There are many potential causes of rosacea including genetic, environmental, vascular, and inflammatory factors. These include, but are not limited to:  Chronic exposure to ultraviolet radiation   Increased immune  responses in the form of cathelicidins that promote vessel dilation and infiltration with white blood cells (neutrophils) into the dermis  Increased matrix metalloproteinases such as collagen and elastase that remodel normal tissue may contribute to inflammation of the skin making it thicker and harder  There is some evidence to suggest that increased numbers of demodex mites on patient skin may contribute to rosacea papules     General Treatment Approach   Avoid exacerbating factors such as heat, spicy foods, and alcohol   Use daily SPF30+ sunscreen and other methods of coverage for sun protection  Use water-based make-up   Avoid applying topical steroids to affected areas as they can cause perioral dermatitis and exacerbate rosacea     Topical Treatment Approach  Metronidazole cream or gel by itself or in combination with oral antibiotics for more severe cases  Azelaic acid cream or lotion is effective for mild inflammatory rosacea when applied twice daily to affected areas  Brimonidine gel and oxymetazoline hydrochloride cream can reduce facial redness temporarily   Ivermectin cream can treat papulopustular rosacea by controlling demodex mites and inflammation   Pimecrolimus cream or tacrolimus ointment twice a day for 2-3 months can help reduce inflammation    Oral Treatment Approach  Antibiotics such as doxycycline, minocycline, or erythromycin for 1-3 months  Clonidine and carvedilol can help reduce facial flushing and are generally well tolerated. Common side effects include low blood pressure, gastrointestinal upset, dry eyes, blurred vision and low heart rate.   Isotretinoin at low doses can be effective for long term treatment when antibiotics fail. Side effects may make it unsuitable for some patients.   NSAIDs such as diclofenac can help reduce discomfort and redness in the skin.     Procedural/Surgical Treatment Approach   Vascular lasers or intense pulsed light treatment may be used to treat persistent  telangiectasia and papulopustular rosacea  Plastic surgery and carbon dioxide lasers may be used to treat rhinophyma    Scribe Attestation    I,:  Zoe Medina MA am acting as a scribe while in the presence of the attending physician.:       I,:  Nicole Christensen MD personally performed the services described in this documentation    as scribed in my presence.:

## 2025-07-28 DIAGNOSIS — E03.8 SUBCLINICAL HYPOTHYROIDISM: ICD-10-CM

## 2025-07-30 DIAGNOSIS — E03.8 SUBCLINICAL HYPOTHYROIDISM: Primary | ICD-10-CM

## 2025-07-30 RX ORDER — LEVOTHYROXINE SODIUM 50 UG/1
50 TABLET ORAL DAILY
Qty: 90 TABLET | Refills: 2 | Status: SHIPPED | OUTPATIENT
Start: 2025-07-30